# Patient Record
Sex: MALE | Race: WHITE | NOT HISPANIC OR LATINO | Employment: FULL TIME | URBAN - METROPOLITAN AREA
[De-identification: names, ages, dates, MRNs, and addresses within clinical notes are randomized per-mention and may not be internally consistent; named-entity substitution may affect disease eponyms.]

---

## 2017-12-26 ENCOUNTER — HOSPITAL ENCOUNTER (EMERGENCY)
Facility: HOSPITAL | Age: 57
Discharge: HOME | End: 2017-12-26
Attending: EMERGENCY MEDICINE | Admitting: EMERGENCY MEDICINE

## 2017-12-26 ENCOUNTER — APPOINTMENT (OUTPATIENT)
Dept: GENERAL RADIOLOGY | Facility: HOSPITAL | Age: 57
End: 2017-12-26

## 2017-12-26 ENCOUNTER — APPOINTMENT (OUTPATIENT)
Dept: CT IMAGING | Facility: HOSPITAL | Age: 57
End: 2017-12-26

## 2017-12-26 VITALS
RESPIRATION RATE: 18 BRPM | BODY MASS INDEX: 27.77 KG/M2 | HEART RATE: 55 BPM | SYSTOLIC BLOOD PRESSURE: 122 MMHG | WEIGHT: 205 LBS | OXYGEN SATURATION: 96 % | DIASTOLIC BLOOD PRESSURE: 86 MMHG | TEMPERATURE: 97.6 F | HEIGHT: 72 IN

## 2017-12-26 DIAGNOSIS — N20.0 KIDNEY STONE: Primary | ICD-10-CM

## 2017-12-26 LAB
ALBUMIN SERPL-MCNC: 4.8 G/DL (ref 3.2–4.8)
ALBUMIN/GLOB SERPL: 1.8 G/DL (ref 1.5–2.5)
ALP SERPL-CCNC: 77 U/L (ref 25–100)
ALT SERPL W P-5'-P-CCNC: 28 U/L (ref 7–40)
ANION GAP SERPL CALCULATED.3IONS-SCNC: 9 MMOL/L (ref 3–11)
AST SERPL-CCNC: 24 U/L (ref 0–33)
BACTERIA UR QL AUTO: ABNORMAL /HPF
BASOPHILS # BLD AUTO: 0.03 10*3/MM3 (ref 0–0.2)
BASOPHILS NFR BLD AUTO: 0.2 % (ref 0–1)
BILIRUB SERPL-MCNC: 1.8 MG/DL (ref 0.3–1.2)
BILIRUB UR QL STRIP: NEGATIVE
BNP SERPL-MCNC: 15 PG/ML (ref 0–100)
BUN BLD-MCNC: 23 MG/DL (ref 9–23)
BUN/CREAT SERPL: 20.9 (ref 7–25)
CALCIUM SPEC-SCNC: 9.5 MG/DL (ref 8.7–10.4)
CHLORIDE SERPL-SCNC: 106 MMOL/L (ref 99–109)
CLARITY UR: ABNORMAL
CO2 SERPL-SCNC: 28 MMOL/L (ref 20–31)
COLOR UR: YELLOW
CREAT BLD-MCNC: 1.1 MG/DL (ref 0.6–1.3)
DEPRECATED RDW RBC AUTO: 47.3 FL (ref 37–54)
EOSINOPHIL # BLD AUTO: 0.03 10*3/MM3 (ref 0–0.3)
EOSINOPHIL NFR BLD AUTO: 0.2 % (ref 0–3)
ERYTHROCYTE [DISTWIDTH] IN BLOOD BY AUTOMATED COUNT: 14 % (ref 11.3–14.5)
GFR SERPL CREATININE-BSD FRML MDRD: 69 ML/MIN/1.73
GLOBULIN UR ELPH-MCNC: 2.6 GM/DL
GLUCOSE BLD-MCNC: 121 MG/DL (ref 70–100)
GLUCOSE UR STRIP-MCNC: NEGATIVE MG/DL
HCT VFR BLD AUTO: 46 % (ref 38.9–50.9)
HGB BLD-MCNC: 15.7 G/DL (ref 13.1–17.5)
HGB UR QL STRIP.AUTO: ABNORMAL
HOLD SPECIMEN: NORMAL
HOLD SPECIMEN: NORMAL
HYALINE CASTS UR QL AUTO: ABNORMAL /LPF
IMM GRANULOCYTES # BLD: 0.03 10*3/MM3 (ref 0–0.03)
IMM GRANULOCYTES NFR BLD: 0.2 % (ref 0–0.6)
KETONES UR QL STRIP: ABNORMAL
LEUKOCYTE ESTERASE UR QL STRIP.AUTO: NEGATIVE
LIPASE SERPL-CCNC: 30 U/L (ref 6–51)
LYMPHOCYTES # BLD AUTO: 0.6 10*3/MM3 (ref 0.6–4.8)
LYMPHOCYTES NFR BLD AUTO: 4.3 % (ref 24–44)
MCH RBC QN AUTO: 31.7 PG (ref 27–31)
MCHC RBC AUTO-ENTMCNC: 34.1 G/DL (ref 32–36)
MCV RBC AUTO: 92.9 FL (ref 80–99)
MONOCYTES # BLD AUTO: 0.71 10*3/MM3 (ref 0–1)
MONOCYTES NFR BLD AUTO: 5 % (ref 0–12)
NEUTROPHILS # BLD AUTO: 12.66 10*3/MM3 (ref 1.5–8.3)
NEUTROPHILS NFR BLD AUTO: 90.1 % (ref 41–71)
NITRITE UR QL STRIP: NEGATIVE
PH UR STRIP.AUTO: <=5 [PH] (ref 5–8)
PLATELET # BLD AUTO: 280 10*3/MM3 (ref 150–450)
PMV BLD AUTO: 10.1 FL (ref 6–12)
POTASSIUM BLD-SCNC: 4.1 MMOL/L (ref 3.5–5.5)
PROT SERPL-MCNC: 7.4 G/DL (ref 5.7–8.2)
PROT UR QL STRIP: NEGATIVE
RBC # BLD AUTO: 4.95 10*6/MM3 (ref 4.2–5.76)
RBC # UR: ABNORMAL /HPF
REF LAB TEST METHOD: ABNORMAL
SODIUM BLD-SCNC: 143 MMOL/L (ref 132–146)
SP GR UR STRIP: 1.02 (ref 1–1.03)
SQUAMOUS #/AREA URNS HPF: ABNORMAL /HPF
TROPONIN I SERPL-MCNC: 0 NG/ML (ref 0–0.07)
UROBILINOGEN UR QL STRIP: ABNORMAL
WBC NRBC COR # BLD: 14.06 10*3/MM3 (ref 3.5–10.8)
WBC UR QL AUTO: ABNORMAL /HPF
WHOLE BLOOD HOLD SPECIMEN: NORMAL
WHOLE BLOOD HOLD SPECIMEN: NORMAL

## 2017-12-26 PROCEDURE — 85025 COMPLETE CBC W/AUTO DIFF WBC: CPT | Performed by: EMERGENCY MEDICINE

## 2017-12-26 PROCEDURE — 71010 HC CHEST PA OR AP: CPT

## 2017-12-26 PROCEDURE — 84484 ASSAY OF TROPONIN QUANT: CPT

## 2017-12-26 PROCEDURE — 83880 ASSAY OF NATRIURETIC PEPTIDE: CPT | Performed by: EMERGENCY MEDICINE

## 2017-12-26 PROCEDURE — 96375 TX/PRO/DX INJ NEW DRUG ADDON: CPT

## 2017-12-26 PROCEDURE — 93005 ELECTROCARDIOGRAM TRACING: CPT

## 2017-12-26 PROCEDURE — 81001 URINALYSIS AUTO W/SCOPE: CPT | Performed by: EMERGENCY MEDICINE

## 2017-12-26 PROCEDURE — 96361 HYDRATE IV INFUSION ADD-ON: CPT

## 2017-12-26 PROCEDURE — 99283 EMERGENCY DEPT VISIT LOW MDM: CPT

## 2017-12-26 PROCEDURE — 96374 THER/PROPH/DIAG INJ IV PUSH: CPT

## 2017-12-26 PROCEDURE — 83690 ASSAY OF LIPASE: CPT | Performed by: EMERGENCY MEDICINE

## 2017-12-26 PROCEDURE — 80053 COMPREHEN METABOLIC PANEL: CPT | Performed by: EMERGENCY MEDICINE

## 2017-12-26 PROCEDURE — 25010000002 KETOROLAC TROMETHAMINE PER 15 MG: Performed by: EMERGENCY MEDICINE

## 2017-12-26 PROCEDURE — 74176 CT ABD & PELVIS W/O CONTRAST: CPT

## 2017-12-26 PROCEDURE — 25010000002 ONDANSETRON PER 1 MG: Performed by: EMERGENCY MEDICINE

## 2017-12-26 RX ORDER — FOLIC ACID 1 MG/1
2 TABLET ORAL DAILY
COMMUNITY

## 2017-12-26 RX ORDER — KETOROLAC TROMETHAMINE 10 MG/1
10 TABLET, FILM COATED ORAL EVERY 6 HOURS PRN
Qty: 12 TABLET | Refills: 0 | Status: SHIPPED | OUTPATIENT
Start: 2017-12-26 | End: 2018-01-31

## 2017-12-26 RX ORDER — LEVOTHYROXINE SODIUM 175 UG/1
175 TABLET ORAL DAILY
COMMUNITY
End: 2018-08-09

## 2017-12-26 RX ORDER — KETOROLAC TROMETHAMINE 15 MG/ML
15 INJECTION, SOLUTION INTRAMUSCULAR; INTRAVENOUS ONCE
Status: COMPLETED | OUTPATIENT
Start: 2017-12-26 | End: 2017-12-26

## 2017-12-26 RX ORDER — OXYCODONE AND ACETAMINOPHEN 7.5; 325 MG/1; MG/1
1 TABLET ORAL EVERY 6 HOURS PRN
Qty: 12 TABLET | Refills: 0 | Status: SHIPPED | OUTPATIENT
Start: 2017-12-26 | End: 2018-01-31

## 2017-12-26 RX ORDER — ONDANSETRON 2 MG/ML
4 INJECTION INTRAMUSCULAR; INTRAVENOUS ONCE
Status: COMPLETED | OUTPATIENT
Start: 2017-12-26 | End: 2017-12-26

## 2017-12-26 RX ORDER — ASPIRIN 81 MG/1
324 TABLET, CHEWABLE ORAL ONCE
Status: DISCONTINUED | OUTPATIENT
Start: 2017-12-26 | End: 2017-12-26

## 2017-12-26 RX ORDER — SODIUM CHLORIDE 0.9 % (FLUSH) 0.9 %
10 SYRINGE (ML) INJECTION AS NEEDED
Status: DISCONTINUED | OUTPATIENT
Start: 2017-12-26 | End: 2017-12-27 | Stop reason: HOSPADM

## 2017-12-26 RX ORDER — TAMSULOSIN HYDROCHLORIDE 0.4 MG/1
1 CAPSULE ORAL NIGHTLY
Qty: 10 CAPSULE | Refills: 0 | Status: SHIPPED | OUTPATIENT
Start: 2017-12-26 | End: 2018-01-31

## 2017-12-26 RX ORDER — ONDANSETRON 4 MG/1
4 TABLET, FILM COATED ORAL EVERY 6 HOURS PRN
Qty: 10 TABLET | Refills: 0 | Status: SHIPPED | OUTPATIENT
Start: 2017-12-26 | End: 2018-01-31

## 2017-12-26 RX ORDER — SODIUM CHLORIDE 9 MG/ML
125 INJECTION, SOLUTION INTRAVENOUS CONTINUOUS
Status: DISCONTINUED | OUTPATIENT
Start: 2017-12-26 | End: 2017-12-27 | Stop reason: HOSPADM

## 2017-12-26 RX ADMIN — ONDANSETRON 4 MG: 2 INJECTION INTRAMUSCULAR; INTRAVENOUS at 21:47

## 2017-12-26 RX ADMIN — SODIUM CHLORIDE 1000 ML: 9 INJECTION, SOLUTION INTRAVENOUS at 21:47

## 2017-12-26 RX ADMIN — KETOROLAC TROMETHAMINE 15 MG: 15 INJECTION, SOLUTION INTRAMUSCULAR; INTRAVENOUS at 21:47

## 2018-01-31 ENCOUNTER — OFFICE VISIT (OUTPATIENT)
Dept: INTERNAL MEDICINE | Facility: CLINIC | Age: 58
End: 2018-01-31

## 2018-01-31 VITALS
WEIGHT: 213 LBS | TEMPERATURE: 98.2 F | BODY MASS INDEX: 28.85 KG/M2 | DIASTOLIC BLOOD PRESSURE: 70 MMHG | SYSTOLIC BLOOD PRESSURE: 124 MMHG | HEIGHT: 72 IN

## 2018-01-31 DIAGNOSIS — Z12.11 SCREENING FOR COLON CANCER: ICD-10-CM

## 2018-01-31 DIAGNOSIS — G47.33 OSA (OBSTRUCTIVE SLEEP APNEA): ICD-10-CM

## 2018-01-31 DIAGNOSIS — Z00.00 HEALTH CARE MAINTENANCE: ICD-10-CM

## 2018-01-31 DIAGNOSIS — E03.9 ACQUIRED HYPOTHYROIDISM: Primary | ICD-10-CM

## 2018-01-31 DIAGNOSIS — Z11.59 ENCOUNTER FOR HEPATITIS C SCREENING TEST FOR LOW RISK PATIENT: ICD-10-CM

## 2018-01-31 PROBLEM — Z87.442 HISTORY OF KIDNEY STONES: Status: ACTIVE | Noted: 2018-01-31

## 2018-01-31 PROBLEM — M05.79 RHEUMATOID ARTHRITIS INVOLVING MULTIPLE SITES WITH POSITIVE RHEUMATOID FACTOR (HCC): Status: ACTIVE | Noted: 2018-01-31

## 2018-01-31 LAB
ALBUMIN SERPL-MCNC: 4.4 G/DL (ref 3.2–4.8)
ALBUMIN/GLOB SERPL: 1.8 G/DL (ref 1.5–2.5)
ALP SERPL-CCNC: 68 U/L (ref 25–100)
ALT SERPL W P-5'-P-CCNC: 22 U/L (ref 7–40)
ANION GAP SERPL CALCULATED.3IONS-SCNC: 6 MMOL/L (ref 3–11)
ARTICHOKE IGE QN: 65 MG/DL (ref 0–130)
AST SERPL-CCNC: 20 U/L (ref 0–33)
BILIRUB SERPL-MCNC: 1.3 MG/DL (ref 0.3–1.2)
BUN BLD-MCNC: 21 MG/DL (ref 9–23)
BUN/CREAT SERPL: 23.3 (ref 7–25)
CALCIUM SPEC-SCNC: 9.1 MG/DL (ref 8.7–10.4)
CHLORIDE SERPL-SCNC: 103 MMOL/L (ref 99–109)
CHOLEST SERPL-MCNC: 133 MG/DL (ref 0–200)
CO2 SERPL-SCNC: 30 MMOL/L (ref 20–31)
CREAT BLD-MCNC: 0.9 MG/DL (ref 0.6–1.3)
GFR SERPL CREATININE-BSD FRML MDRD: 86 ML/MIN/1.73
GLOBULIN UR ELPH-MCNC: 2.4 GM/DL
GLUCOSE BLD-MCNC: 92 MG/DL (ref 70–100)
HCV AB SER DONR QL: NORMAL
HDLC SERPL-MCNC: 50 MG/DL (ref 40–60)
POTASSIUM BLD-SCNC: 4.6 MMOL/L (ref 3.5–5.5)
PROT SERPL-MCNC: 6.8 G/DL (ref 5.7–8.2)
SODIUM BLD-SCNC: 139 MMOL/L (ref 132–146)
T4 FREE SERPL-MCNC: 1.47 NG/DL (ref 0.89–1.76)
TRIGL SERPL-MCNC: 112 MG/DL (ref 0–150)
TSH SERPL DL<=0.05 MIU/L-ACNC: 1.79 MIU/ML (ref 0.35–5.35)

## 2018-01-31 PROCEDURE — 80061 LIPID PANEL: CPT | Performed by: INTERNAL MEDICINE

## 2018-01-31 PROCEDURE — 80053 COMPREHEN METABOLIC PANEL: CPT | Performed by: INTERNAL MEDICINE

## 2018-01-31 PROCEDURE — 86803 HEPATITIS C AB TEST: CPT | Performed by: INTERNAL MEDICINE

## 2018-01-31 PROCEDURE — 84439 ASSAY OF FREE THYROXINE: CPT | Performed by: INTERNAL MEDICINE

## 2018-01-31 PROCEDURE — 99204 OFFICE O/P NEW MOD 45 MIN: CPT | Performed by: INTERNAL MEDICINE

## 2018-01-31 PROCEDURE — 84443 ASSAY THYROID STIM HORMONE: CPT | Performed by: INTERNAL MEDICINE

## 2018-01-31 NOTE — PATIENT INSTRUCTIONS
Hypothyroidism  Hypothyroidism is a disorder of the thyroid. The thyroid is a large gland that is located in the lower front of the neck. The thyroid releases hormones that control how the body works. With hypothyroidism, the thyroid does not make enough of these hormones.  What are the causes?  Causes of hypothyroidism may include:  · Viral infections.  · Pregnancy.  · Your own defense system (immune system) attacking your thyroid.  · Certain medicines.  · Birth defects.  · Past radiation treatments to your head or neck.  · Past treatment with radioactive iodine.  · Past surgical removal of part or all of your thyroid.  · Problems with the gland that is located in the center of your brain (pituitary).  What are the signs or symptoms?  Signs and symptoms of hypothyroidism may include:  · Feeling as though you have no energy (lethargy).  · Inability to tolerate cold.  · Weight gain that is not explained by a change in diet or exercise habits.  · Dry skin.  · Coarse hair.  · Menstrual irregularity.  · Slowing of thought processes.  · Constipation.  · Sadness or depression.  How is this diagnosed?  Your health care provider may diagnose hypothyroidism with blood tests and ultrasound tests.  How is this treated?  Hypothyroidism is treated with medicine that replaces the hormones that your body does not make. After you begin treatment, it may take several weeks for symptoms to go away.  Follow these instructions at home:  · Take medicines only as directed by your health care provider.  · If you start taking any new medicines, tell your health care provider.  · Keep all follow-up visits as directed by your health care provider. This is important. As your condition improves, your dosage needs may change. You will need to have blood tests regularly so that your health care provider can watch your condition.  Contact a health care provider if:  · Your symptoms do not get better with treatment.  · You are taking thyroid  replacement medicine and:  ¨ You sweat excessively.  ¨ You have tremors.  ¨ You feel anxious.  ¨ You lose weight rapidly.  ¨ You cannot tolerate heat.  ¨ You have emotional swings.  ¨ You have diarrhea.  ¨ You feel weak.  Get help right away if:  · You develop chest pain.  · You develop an irregular heartbeat.  · You develop a rapid heartbeat.  This information is not intended to replace advice given to you by your health care provider. Make sure you discuss any questions you have with your health care provider.  Document Released: 12/18/2006 Document Revised: 05/25/2017 Document Reviewed: 05/05/2015  [employer] Interactive Patient Education © 2017 [employer]

## 2018-01-31 NOTE — PROGRESS NOTES
Subjective   Spenser Pascal is a 57 y.o. male here to establish care for kidney stone, RA, JAZMIN, hypothyroidism.  Kidney stone: went to ED recently for right flank pain. 4mm stone, has uro f/u today. He has not passed any stones or had any hematuria. He continues to have dull nagging right flank pain but nothing severe. RA: seeing rheum, had MTX dose increased recently. Also on folic acid. Not having any joint pain. JAZMIN: has a cpap and needs a new sleep med doctor as he has just moved here and doesn't have a local doc. He sleeps only 4h a night then wakes, awake for awhile, then sleeps for another 2h then gets up for the day. He has tried melatonin which doesn't help. Hypothyroidism: discovered on routine screening blood work, dose hasn't been changed for 1.5y but he has not had blood work since then either. Has hx of diverticulosis and polyps and requires screening cscope q5y, needs referral for that as well. No bowel sx.     Review of Systems   Constitutional: Negative.    HENT: Negative.    Eyes: Negative.    Respiratory: Negative.  Negative for shortness of breath.    Cardiovascular: Negative.  Negative for chest pain and palpitations.   Gastrointestinal: Negative.    Endocrine: Negative.    Genitourinary: Negative for decreased urine volume and hematuria.        Flank pain   Musculoskeletal: Negative.  Negative for neck pain.   Skin: Negative.    Allergic/Immunologic: Negative.    Neurological: Negative.  Negative for headaches.   Hematological: Negative.    Psychiatric/Behavioral: Negative.        Past Medical History:   Diagnosis Date   • Otosclerosis    • Rheumatoid arthritis      History reviewed. No pertinent family history.  Past Surgical History:   Procedure Laterality Date   • LAMINECTOMY     • LIPOMA EXCISION     • SALIVARY GLAND SURGERY       Social History     Social History   • Marital status:      Spouse name: N/A   • Number of children: N/A   • Years of education: N/A     Occupational History  "  • Not on file.     Social History Main Topics   • Smoking status: Never Smoker   • Smokeless tobacco: Not on file   • Alcohol use 3.0 oz/week     5 Glasses of wine per week   • Drug use: No   • Sexual activity: Not on file     Other Topics Concern   • Not on file     Social History Narrative         Current Outpatient Prescriptions:   •  folic acid (FOLVITE) 1 MG tablet, Take 2 mg by mouth Daily., Disp: , Rfl:   •  levothyroxine (SYNTHROID, LEVOTHROID) 175 MCG tablet, Take 175 mcg by mouth Daily., Disp: , Rfl:   •  METHOTREXATE, ANTI-RHEUMATIC, PO, Take  by mouth 1 (One) Time Per Week., Disp: , Rfl:   •  VITAMIN D, CHOLECALCIFEROL, PO, Take  by mouth 1 (One) Time Per Week., Disp: , Rfl:     Objective   /70 (BP Location: Right arm, Patient Position: Sitting, Cuff Size: Adult)  Temp 98.2 °F (36.8 °C) (Temporal Artery )   Ht 182.9 cm (72\")  Wt 96.6 kg (213 lb)  BMI 28.89 kg/m2  Physical Exam   Constitutional: He is oriented to person, place, and time. He appears well-developed and well-nourished.   HENT:   Head: Normocephalic and atraumatic.   Nose: Nose normal.   Eyes: Conjunctivae are normal.   Cardiovascular: Normal rate, regular rhythm and normal heart sounds.  Exam reveals no gallop and no friction rub.    No murmur heard.  Pulmonary/Chest: Effort normal and breath sounds normal. He has no wheezes.   Musculoskeletal:   Normal gait and station   Neurological: He is alert and oriented to person, place, and time.   Skin: Skin is warm and dry.   Psychiatric: He has a normal mood and affect. His behavior is normal. Judgment and thought content normal.   Vitals reviewed.      Assessment/Plan   Spenser was seen today for establish care.    Diagnoses and all orders for this visit:    Acquired hypothyroidism  -     TSH  -     T4, Free    Health care maintenance  -     Comprehensive Metabolic Panel  -     Lipid Panel    Encounter for hepatitis C screening test for low risk patient  -     Hepatitis C " Antibody    Screening for colon cancer  -     Ambulatory Referral For Screening Colonoscopy    JAZMIN (obstructive sleep apnea)  -     Ambulatory Referral to Sleep Medicine           Answers for HPI/ROS submitted by the patient on 1/31/2018   Hypertension  Chronicity: new  Onset: 1 to 4 weeks ago  Progression since onset: unchanged  Condition status: resistant  anxiety: Yes  blurred vision: No  malaise/fatigue: No  orthopnea: No  peripheral edema: No  PND: No  sweats: No  Agents associated with hypertension: thyroid hormones  CAD risks: family history, stress  Compliance problems: no compliance problems

## 2018-03-22 ENCOUNTER — LAB REQUISITION (OUTPATIENT)
Dept: LAB | Facility: HOSPITAL | Age: 58
End: 2018-03-22

## 2018-03-22 ENCOUNTER — OUTSIDE FACILITY SERVICE (OUTPATIENT)
Dept: GASTROENTEROLOGY | Facility: CLINIC | Age: 58
End: 2018-03-22

## 2018-03-22 DIAGNOSIS — D12.2 BENIGN NEOPLASM OF ASCENDING COLON: ICD-10-CM

## 2018-03-22 PROCEDURE — 88305 TISSUE EXAM BY PATHOLOGIST: CPT | Performed by: INTERNAL MEDICINE

## 2018-03-22 PROCEDURE — 45385 COLONOSCOPY W/LESION REMOVAL: CPT | Performed by: INTERNAL MEDICINE

## 2018-03-23 ENCOUNTER — TELEPHONE (OUTPATIENT)
Dept: GASTROENTEROLOGY | Facility: CLINIC | Age: 58
End: 2018-03-23

## 2018-03-23 LAB
CYTO UR: NORMAL
LAB AP CASE REPORT: NORMAL
LAB AP CLINICAL INFORMATION: NORMAL
Lab: NORMAL
PATH REPORT.FINAL DX SPEC: NORMAL
PATH REPORT.GROSS SPEC: NORMAL

## 2018-03-23 NOTE — TELEPHONE ENCOUNTER
----- Message from Nii Mills MD sent at 3/23/2018  2:23 PM EDT -----  Please call Spenser and inform him he had an adenoma. Follow up in 5 years is indicated. Dr. Mills

## 2018-07-30 ENCOUNTER — OFFICE VISIT (OUTPATIENT)
Dept: INTERNAL MEDICINE | Facility: CLINIC | Age: 58
End: 2018-07-30

## 2018-07-30 VITALS
HEIGHT: 72 IN | SYSTOLIC BLOOD PRESSURE: 118 MMHG | DIASTOLIC BLOOD PRESSURE: 74 MMHG | TEMPERATURE: 97.5 F | WEIGHT: 212 LBS | BODY MASS INDEX: 28.71 KG/M2

## 2018-07-30 DIAGNOSIS — E55.9 VITAMIN D DEFICIENCY: ICD-10-CM

## 2018-07-30 DIAGNOSIS — E03.9 ACQUIRED HYPOTHYROIDISM: Primary | ICD-10-CM

## 2018-07-30 PROCEDURE — 99213 OFFICE O/P EST LOW 20 MIN: CPT | Performed by: INTERNAL MEDICINE

## 2018-07-30 RX ORDER — ADALIMUMAB 40MG/0.8ML
KIT SUBCUTANEOUS
COMMUNITY
Start: 2018-07-18 | End: 2019-01-25

## 2018-08-09 LAB
25(OH)D3 SERPL-MCNC: 23.8 NG/ML
T4 FREE SERPL-MCNC: 1.27 NG/DL (ref 0.89–1.76)
TSH SERPL DL<=0.05 MIU/L-ACNC: 5.16 MIU/ML (ref 0.35–5.35)

## 2018-08-09 PROCEDURE — 84443 ASSAY THYROID STIM HORMONE: CPT | Performed by: INTERNAL MEDICINE

## 2018-08-09 PROCEDURE — 84439 ASSAY OF FREE THYROXINE: CPT | Performed by: INTERNAL MEDICINE

## 2018-08-09 PROCEDURE — 82306 VITAMIN D 25 HYDROXY: CPT | Performed by: INTERNAL MEDICINE

## 2018-08-09 RX ORDER — LEVOTHYROXINE SODIUM 0.2 MG/1
200 TABLET ORAL DAILY
Qty: 30 TABLET | Refills: 1 | Status: SHIPPED | OUTPATIENT
Start: 2018-08-09 | End: 2018-10-04 | Stop reason: SDUPTHER

## 2018-08-10 ENCOUNTER — TELEPHONE (OUTPATIENT)
Dept: INTERNAL MEDICINE | Facility: CLINIC | Age: 58
End: 2018-08-10

## 2018-08-10 NOTE — TELEPHONE ENCOUNTER
----- Message from Mariposa Palm MD sent at 8/9/2018 12:31 PM EDT -----  Please call the patient regarding his abnormal result. Tell him thyroid dose is too low, increasing to 200mcg daily and I sent that in. Recheck 6 weeks on new dose. Also still low on vitamin D, does he take a supplement? Rec 4,000 units daily.

## 2018-09-22 ENCOUNTER — OFFICE VISIT (OUTPATIENT)
Dept: RETAIL CLINIC | Facility: CLINIC | Age: 58
End: 2018-09-22

## 2018-09-22 VITALS
HEIGHT: 72 IN | WEIGHT: 208 LBS | BODY MASS INDEX: 28.17 KG/M2 | RESPIRATION RATE: 16 BRPM | SYSTOLIC BLOOD PRESSURE: 150 MMHG | OXYGEN SATURATION: 98 % | TEMPERATURE: 98.6 F | DIASTOLIC BLOOD PRESSURE: 80 MMHG | HEART RATE: 71 BPM

## 2018-09-22 DIAGNOSIS — T16.2XXA ACUTE FOREIGN BODY OF LEFT EAR, INITIAL ENCOUNTER: Primary | ICD-10-CM

## 2018-09-22 PROCEDURE — 99213 OFFICE O/P EST LOW 20 MIN: CPT | Performed by: NURSE PRACTITIONER

## 2018-09-22 PROCEDURE — 69200 CLEAR OUTER EAR CANAL: CPT | Performed by: NURSE PRACTITIONER

## 2018-09-22 NOTE — PATIENT INSTRUCTIONS
Ear Foreign Body  An ear foreign body is an object that is stuck in your ear. The object is usually stuck in the ear canal.  What are the causes?  In all ages of people, the most common foreign bodies are insects that enter the ear canal. It is common for young children to put objects into the ear canal. These may include marvin, beads, parts of toys, and any other small objects that fit into the ear. In adults, objects such as cotton swabs may become lodged in the ear canal.  What are the signs or symptoms?  A foreign body in the ear may cause:  · Pain.  · Buzzing or roaring sounds.  · Hearing loss.  · Ear drainage or bleeding.  · Nausea and vomiting.  · A feeling that your ear is full.    How is this diagnosed?  Your health care provider may be able to diagnose an ear foreign body based on the information that you provide, your symptoms, and a physical exam. Your health care provider may also perform tests, such as testing your hearing and your ear pressure, to check for infection or other problems that are caused by the foreign body in your ear.  How is this treated?  Treatment depends on what the foreign body is, the location of the foreign body in your ear, and whether or not the foreign body has injured any part of your inner ear. If the foreign body is visible to your health care provider, it may be possible to remove the foreign body using:  · A tool, such as medical tweezers (forceps) or a suction tube (catheter).  · Irrigation. This uses water to flush the foreign body out of your ear. This is used only if the foreign body is not likely to swell or enlarge when it is put in water.    If the foreign body is not visible or your health care provider was not able to remove the foreign body, you may be referred to a specialist for removal. You may also be prescribed antibiotic medicine or ear drops to prevent infection. If the foreign body has caused injury to other parts of your ear, you may need additional  treatment.  Follow these instructions at home:  · Keep all follow-up visits as directed by your health care provider. This is important.  · Take medicines only as directed by your health care provider.  · If you were prescribed an antibiotic medicine, finish it all even if you start to feel better.  How is this prevented?  · Keep small objects out of reach of young children. Tell children not to put anything in their ears.  · Do not put anything in your ear, including cotton swabs, to clean your ears. Talk to your health care provider about how to clean your ears safely.  Contact a health care provider if:  · You have a headache.  · Your have blood coming from your ear.  · You have a fever.  · You have increased pain or swelling of your ear.  · Your hearing is reduced.  · You have discharge coming from your ear.  This information is not intended to replace advice given to you by your health care provider. Make sure you discuss any questions you have with your health care provider.  Document Released: 12/15/2001 Document Revised: 04/28/2017 Document Reviewed: 08/03/2015  [employer] Interactive Patient Education © 2018 [employer]    Call clinic for questions/problems  Keep scheduled appt with primary care providers and other specialists

## 2018-09-22 NOTE — PROGRESS NOTES
Subjective   Foreign Body in Ear    Spenser Pascal is a 58 y.o. male who presents with foreign body in left ear canal. States noted tip of hearing aid stuck in ear x 3 days. Has attempted to remove with tweezers without success.  He has no additional symptoms or complaints.     Foreign Body in Ear   The incident occurred 3 to 5 days ago. Suspected object: platic tip of hearing aide. The foreign body is suspected to be in the left ear. The incident was reported. Associated symptoms include hearing loss (chonic with use of bilat hearing aids). Pertinent negatives include no chest pain, cough, trouble swallowing or wheezing. Associated symptoms comments: Pressure to left ear canal  . His past medical history is significant for a prior foreign body removal (left ear canal).        History Obtained from: Patient    Past Medical History:   Diagnosis Date   • Otosclerosis    • Rheumatoid arthritis (CMS/HCC)      Past Surgical History:   Procedure Laterality Date   • LAMINECTOMY     • LIPOMA EXCISION     • SALIVARY GLAND SURGERY       Social History     Social History   • Marital status:      Spouse name: N/A   • Number of children: N/A   • Years of education: N/A     Occupational History   • Not on file.     Social History Main Topics   • Smoking status: Never Smoker   • Smokeless tobacco: Never Used   • Alcohol use 3.0 oz/week     5 Glasses of wine per week   • Drug use: No   • Sexual activity: Not on file     Other Topics Concern   • Not on file     Social History Narrative   • No narrative on file     No family history on file.  No Known Allergies  Current Outpatient Prescriptions   Medication Sig Dispense Refill   • folic acid (FOLVITE) 1 MG tablet Take 2 mg by mouth Daily.     • HUMIRA PEN 40 MG/0.8ML Pen-injector Kit      • levothyroxine (SYNTHROID) 200 MCG tablet Take 1 tablet by mouth Daily. 30 tablet 1   • METHOTREXATE, ANTI-RHEUMATIC, PO Take  by mouth 1 (One) Time Per Week.       No current  "facility-administered medications for this visit.         The following portions of the patient's history were reviewed and updated as appropriate: allergies, current medications, past family history, past medical history, past social history and past surgical history.    Review of Systems   Constitutional: Negative.    HENT: Positive for ear pain (pressure left ear canal) and hearing loss (chonic with use of bilat hearing aids). Negative for ear discharge, postnasal drip, rhinorrhea, sinus pressure and trouble swallowing.    Eyes: Negative.    Respiratory: Negative for cough, shortness of breath and wheezing.    Cardiovascular: Negative for chest pain and palpitations.   Gastrointestinal: Negative.    Musculoskeletal: Positive for arthralgias (chronic, with h/o RA). Negative for neck pain and neck stiffness.   Skin: Negative for rash and wound.   Allergic/Immunologic: Positive for environmental allergies and immunocompromised state (due to use of immunosupressive medications).   Neurological: Negative for dizziness, light-headedness and headaches.   Hematological: Negative for adenopathy. Does not bruise/bleed easily.   Psychiatric/Behavioral: Negative.        Objective     VITAL SIGNS:   Vitals:    09/22/18 0955   BP: 150/80   Pulse: 71   Resp: 16   Temp: 98.6 °F (37 °C)   SpO2: 98%   Weight: 94.3 kg (208 lb)   Height: 182.9 cm (72\")   Body mass index is 28.21 kg/m².    Physical Exam   Constitutional: He appears well-nourished. He is cooperative.  Non-toxic appearance. He does not appear ill. No distress.   HENT:   Head: Normocephalic and atraumatic.   Right Ear: External ear and ear canal normal. No swelling. Tympanic membrane is scarred. Tympanic membrane is not perforated.   Left Ear: External ear normal.   Nose: Nose normal. No mucosal edema.   Mouth/Throat: Oropharynx is clear and moist and mucous membranes are normal.   Foreign body visualized left ear canal.    Cardiovascular: Normal rate.  "   Pulmonary/Chest: Effort normal and breath sounds normal.   Lymphadenopathy:        Head (left side): Posterior auricular (subcentimeter node palpated) adenopathy present.     He has no cervical adenopathy.        Right: No supraclavicular adenopathy present.        Left: No supraclavicular adenopathy present.   Neurological: He is alert. Coordination and gait normal.   Skin: Skin is warm and dry.   Psychiatric: His behavior is normal. He is attentive.     Procedure Note:  Under direct visualization, foreign body left ear canal removed successfully with straight forceps. Patient tolerated well. Left ear canal and TM with mild erythema. No TM perforation observed. Patient reports canal pressure relieved.        CLINICAL QUALITY MEASURES:  Tobacco Screening & Intervention Screened & identified as tobacco non-user. Never smoker   WEIGHT SCREENING/BMI  Not eligible, overweight & managed by other physician     Assessment/Plan     Spenser was seen today for foreign body in ear.    Diagnoses and all orders for this visit:    Acute foreign body of left ear, initial encounter      PLAN:  Patient should follow up with primary care provider as scheduled. Observe for ear pain or drainage, promptly report abnormal findings. The patient voiced understanding and agreement to the patient treatment plan and instructions     MOON Sparks

## 2018-10-04 RX ORDER — LEVOTHYROXINE SODIUM 0.2 MG/1
TABLET ORAL
Qty: 30 TABLET | Refills: 1 | Status: SHIPPED | OUTPATIENT
Start: 2018-10-04 | End: 2018-12-14 | Stop reason: SDUPTHER

## 2018-10-16 ENCOUNTER — OFFICE VISIT (OUTPATIENT)
Dept: INTERNAL MEDICINE | Facility: CLINIC | Age: 58
End: 2018-10-16

## 2018-10-16 VITALS
TEMPERATURE: 97.8 F | BODY MASS INDEX: 28.33 KG/M2 | HEIGHT: 72 IN | SYSTOLIC BLOOD PRESSURE: 148 MMHG | WEIGHT: 209.2 LBS | DIASTOLIC BLOOD PRESSURE: 88 MMHG

## 2018-10-16 DIAGNOSIS — R03.0 ELEVATED BLOOD PRESSURE READING: Primary | ICD-10-CM

## 2018-10-16 PROCEDURE — 99213 OFFICE O/P EST LOW 20 MIN: CPT | Performed by: INTERNAL MEDICINE

## 2018-10-16 NOTE — PROGRESS NOTES
"Subjective   Spenser Pascal is a 58 y.o. male here for follow-up high BP readings at rheumatologist with manual cuff, generally 130-140 systolic. Has also had some chest pressure when he had high BP yesterday. Has never had a dx of HTN, was always well-controlled. He has started running again recently, trying to lose weight. Doesn't desire to be on another med. Has hx of RA and is being treated currently by rheum. Doesn't check BP at home.      The following portions of the patient's history were reviewed and updated as appropriate: allergies, current medications, past medical history, past social history and problem list.    Review of Systems:  General: negative  CV: chest pressure  Respiratory: negative  Neuro: some HA    Objective   /88 (BP Location: Left arm, Patient Position: Sitting, Cuff Size: Adult)   Temp 97.8 °F (36.6 °C) (Temporal Artery )   Ht 182.9 cm (72\")   Wt 94.9 kg (209 lb 3.2 oz)   BMI 28.37 kg/m²     Physical Exam   Constitutional: He is oriented to person, place, and time. He appears well-developed and well-nourished.   Pulmonary/Chest: Effort normal.   Neurological: He is alert and oriented to person, place, and time.   Skin: Skin is warm and dry.   Psychiatric: He has a normal mood and affect. His behavior is normal. Judgment and thought content normal.   Vitals reviewed.      Assessment/Plan   Spenser was seen today for follow-up.    Diagnoses and all orders for this visit:    Elevated blood pressure reading  -BP was normal up until 1 month ago as all other measurements have been normal  -get home BP cuff and take BP once daily and send those to me via [employer]. If high values will have pt come back in to discuss a med and compare his cuff with ours         "

## 2018-12-06 RX ORDER — LISINOPRIL 20 MG/1
20 TABLET ORAL DAILY
Qty: 30 TABLET | Refills: 2 | Status: SHIPPED | OUTPATIENT
Start: 2018-12-06 | End: 2019-01-02 | Stop reason: SDUPTHER

## 2018-12-14 RX ORDER — LEVOTHYROXINE SODIUM 0.2 MG/1
TABLET ORAL
Qty: 30 TABLET | Refills: 1 | Status: SHIPPED | OUTPATIENT
Start: 2018-12-14 | End: 2019-02-18 | Stop reason: SDUPTHER

## 2018-12-18 ENCOUNTER — OFFICE VISIT (OUTPATIENT)
Dept: RETAIL CLINIC | Facility: CLINIC | Age: 58
End: 2018-12-18

## 2018-12-18 DIAGNOSIS — Z23 FLU VACCINE NEED: Primary | ICD-10-CM

## 2018-12-18 PROCEDURE — 90471 IMMUNIZATION ADMIN: CPT | Performed by: NURSE PRACTITIONER

## 2018-12-18 PROCEDURE — 90686 IIV4 VACC NO PRSV 0.5 ML IM: CPT | Performed by: NURSE PRACTITIONER

## 2018-12-18 NOTE — PROGRESS NOTES
Patient presents to clinic for seasonal influenza vaccination. There are no verbalized contraindications including allergy to eggs, latex, mercury or other flu vaccine components. Denies previous vaccine reaction, current illness or fever.      Consent discussed and signed. VIS given. Vaccination administered. Patient tolerated well. See scanned documents.     Spenser was seen today for flu vaccine.    Diagnoses and all orders for this visit:    Flu vaccine need    Other orders  -     Fluarix/Fluzone/Afluria/FluLaval ([id number])      Lizzeth Burns, MOON

## 2019-01-03 RX ORDER — LISINOPRIL 20 MG/1
TABLET ORAL
Qty: 30 TABLET | Refills: 2 | Status: SHIPPED | OUTPATIENT
Start: 2019-01-03 | End: 2019-07-01

## 2019-01-25 ENCOUNTER — OFFICE VISIT (OUTPATIENT)
Dept: INTERNAL MEDICINE | Facility: CLINIC | Age: 59
End: 2019-01-25

## 2019-01-25 VITALS
HEART RATE: 55 BPM | SYSTOLIC BLOOD PRESSURE: 138 MMHG | OXYGEN SATURATION: 95 % | WEIGHT: 214.8 LBS | TEMPERATURE: 97.6 F | RESPIRATION RATE: 16 BRPM | BODY MASS INDEX: 29.09 KG/M2 | HEIGHT: 72 IN | DIASTOLIC BLOOD PRESSURE: 92 MMHG

## 2019-01-25 DIAGNOSIS — Z12.5 PROSTATE CANCER SCREENING: ICD-10-CM

## 2019-01-25 DIAGNOSIS — Z23 NEED FOR TDAP VACCINATION: ICD-10-CM

## 2019-01-25 DIAGNOSIS — Z00.00 HEALTH CARE MAINTENANCE: Primary | ICD-10-CM

## 2019-01-25 PROBLEM — I10 BENIGN ESSENTIAL HTN: Status: ACTIVE | Noted: 2019-01-25

## 2019-01-25 LAB
ALBUMIN SERPL-MCNC: 4.66 G/DL (ref 3.2–4.8)
ALBUMIN/GLOB SERPL: 2.5 G/DL (ref 1.5–2.5)
ALP SERPL-CCNC: 62 U/L (ref 25–100)
ALT SERPL W P-5'-P-CCNC: 46 U/L (ref 7–40)
ANION GAP SERPL CALCULATED.3IONS-SCNC: 8 MMOL/L (ref 3–11)
ARTICHOKE IGE QN: 86 MG/DL (ref 0–130)
AST SERPL-CCNC: 27 U/L (ref 0–33)
BILIRUB SERPL-MCNC: 1.7 MG/DL (ref 0.3–1.2)
BUN BLD-MCNC: 22 MG/DL (ref 9–23)
BUN/CREAT SERPL: 24.4 (ref 7–25)
CALCIUM SPEC-SCNC: 9.5 MG/DL (ref 8.7–10.4)
CHLORIDE SERPL-SCNC: 104 MMOL/L (ref 99–109)
CHOLEST SERPL-MCNC: 154 MG/DL (ref 0–200)
CO2 SERPL-SCNC: 28 MMOL/L (ref 20–31)
CREAT BLD-MCNC: 0.9 MG/DL (ref 0.6–1.3)
GFR SERPL CREATININE-BSD FRML MDRD: 86 ML/MIN/1.73
GLOBULIN UR ELPH-MCNC: 1.8 GM/DL
GLUCOSE BLD-MCNC: 80 MG/DL (ref 70–100)
HDLC SERPL-MCNC: 51 MG/DL (ref 40–60)
POTASSIUM BLD-SCNC: 4.4 MMOL/L (ref 3.5–5.5)
PROT SERPL-MCNC: 6.5 G/DL (ref 5.7–8.2)
PSA SERPL-MCNC: 0.71 NG/ML (ref 0–4)
SODIUM BLD-SCNC: 140 MMOL/L (ref 132–146)
TRIGL SERPL-MCNC: 100 MG/DL (ref 0–150)
TSH SERPL DL<=0.05 MIU/L-ACNC: 0.66 MIU/ML (ref 0.35–5.35)

## 2019-01-25 PROCEDURE — 90715 TDAP VACCINE 7 YRS/> IM: CPT | Performed by: INTERNAL MEDICINE

## 2019-01-25 PROCEDURE — 90471 IMMUNIZATION ADMIN: CPT | Performed by: INTERNAL MEDICINE

## 2019-01-25 PROCEDURE — 99396 PREV VISIT EST AGE 40-64: CPT | Performed by: INTERNAL MEDICINE

## 2019-01-25 PROCEDURE — 80061 LIPID PANEL: CPT | Performed by: INTERNAL MEDICINE

## 2019-01-25 PROCEDURE — 84443 ASSAY THYROID STIM HORMONE: CPT | Performed by: INTERNAL MEDICINE

## 2019-01-25 PROCEDURE — G0103 PSA SCREENING: HCPCS | Performed by: INTERNAL MEDICINE

## 2019-01-25 PROCEDURE — 80053 COMPREHEN METABOLIC PANEL: CPT | Performed by: INTERNAL MEDICINE

## 2019-01-25 RX ORDER — INDOMETHACIN 25 MG/1
25 CAPSULE ORAL 3 TIMES DAILY PRN
COMMUNITY
End: 2019-07-01

## 2019-01-25 NOTE — PROGRESS NOTES
Subjective   Spenser Pascal is a 58 y.o. male here for yearly exam. He ate a bowl of cereal this am. He has a granddaughter coming in April, needs Tdap. UTD flu vaccine. Has heard of Shingrix but hasn't attempted to get it. Due for dental exam, been about a year. No complaints today. BP has been better, doesn't take it at home. No negative SE to lisinopril. For RA is just on MTX now and off the humira. Had some GI upset and constipation 2/2 indomethacin.    Review of Systems   Constitutional: Negative.    HENT: Negative.    Eyes: Negative.    Respiratory: Negative.    Cardiovascular: Negative.    Gastrointestinal: Negative.    Endocrine: Negative.    Genitourinary: Negative.    Musculoskeletal: Negative.    Skin: Negative.    Allergic/Immunologic: Negative.    Neurological: Negative.    Hematological: Negative.    Psychiatric/Behavioral: Negative.        Past Medical History:   Diagnosis Date   • Otosclerosis    • Rheumatoid arthritis (CMS/HCC)      Family History   Problem Relation Age of Onset   • Breast cancer Mother    • Hypothyroidism Father    • Parkinsonism Father    • Hypertension Father      Past Surgical History:   Procedure Laterality Date   • LAMINECTOMY     • LIPOMA EXCISION     • SALIVARY GLAND SURGERY       Social History     Socioeconomic History   • Marital status:      Spouse name: Not on file   • Number of children: Not on file   • Years of education: Not on file   • Highest education level: Not on file   Social Needs   • Financial resource strain: Not on file   • Food insecurity - worry: Not on file   • Food insecurity - inability: Not on file   • Transportation needs - medical: Not on file   • Transportation needs - non-medical: Not on file   Occupational History   • Not on file   Tobacco Use   • Smoking status: Never Smoker   • Smokeless tobacco: Never Used   Substance and Sexual Activity   • Alcohol use: Yes     Alcohol/week: 3.0 oz     Types: 5 Glasses of wine per week   • Drug use: No  "  • Sexual activity: Not on file   Other Topics Concern   • Not on file   Social History Narrative   • Not on file         Current Outpatient Medications:   •  folic acid (FOLVITE) 1 MG tablet, Take 2 mg by mouth Daily., Disp: , Rfl:   •  indomethacin (INDOCIN) 25 MG capsule, Take 25 mg by mouth 3 (Three) Times a Day As Needed., Disp: , Rfl:   •  levothyroxine (SYNTHROID, LEVOTHROID) 200 MCG tablet, TAKE 1 TABLET BY MOUTH ONCE DAILY, Disp: 30 tablet, Rfl: 1  •  lisinopril (PRINIVIL,ZESTRIL) 20 MG tablet, TAKE 1 TABLET BY MOUTH ONCE DAILY, Disp: 30 tablet, Rfl: 2  •  METHOTREXATE, ANTI-RHEUMATIC, PO, Take  by mouth 1 (One) Time Per Week., Disp: , Rfl:     Objective   /92   Pulse 55   Temp 97.6 °F (36.4 °C) (Temporal)   Resp 16   Ht 182.9 cm (72\")   Wt 97.4 kg (214 lb 12.8 oz)   SpO2 95%   BMI 29.13 kg/m²   Physical Exam   Constitutional: He is oriented to person, place, and time. He appears well-developed and well-nourished. No distress.   HENT:   Head: Normocephalic and atraumatic.   Right Ear: Tympanic membrane, external ear and ear canal normal.   Left Ear: Tympanic membrane, external ear and ear canal normal.   Nose: Nose normal.   Mouth/Throat: Oropharynx is clear and moist.   Bilateral hearing aids   Eyes: Conjunctivae and lids are normal. Pupils are equal, round, and reactive to light. No scleral icterus.   Neck: Neck supple. Carotid bruit is not present. No thyroid mass and no thyromegaly present.   Cardiovascular: Normal rate, regular rhythm, normal heart sounds and intact distal pulses. Exam reveals no gallop, no S3, no S4 and no friction rub.   No murmur heard.  Pulmonary/Chest: Effort normal and breath sounds normal. No respiratory distress. He has no wheezes. He has no rhonchi. He has no rales.   Abdominal: Soft. Bowel sounds are normal. He exhibits no distension and no mass. There is no hepatosplenomegaly. There is no tenderness.   Musculoskeletal: Normal range of motion.   Lymphadenopathy: "     He has no cervical adenopathy.   Neurological: He is alert and oriented to person, place, and time. No cranial nerve deficit or sensory deficit.   Skin: Skin is warm and dry. No rash noted. No erythema. No pallor.   Psychiatric: He has a normal mood and affect. His speech is normal and behavior is normal. Judgment and thought content normal. Cognition and memory are normal.   Vitals reviewed.      Assessment/Plan   Spenser was seen today for annual exam and immunizations.    Diagnoses and all orders for this visit:    Health care maintenance  -     Comprehensive Metabolic Panel  -     Lipid Panel  -     TSH    Need for Tdap vaccination  -     Tdap Vaccine Greater Than or Equal To 8yo IM    Prostate cancer screening  -     PSA SCREENING        1. HCM  -cscope UTD, q5y schedule  -non-fasting labs today (cereal)  -rec shingrix  -tdap today  -Reviewed the following with the patient: advised patient of need for:  colonoscopy, PSA and/or prostate exam, immunizations discussed, influenza vaccine and Adacel-Tdap vaccine and encouraged patient to exercise 5-7 days per week for 30 minutes at a time. rec he see the dentist as it has been over a year and needs eye exam.

## 2019-02-18 RX ORDER — LEVOTHYROXINE SODIUM 0.2 MG/1
TABLET ORAL
Qty: 30 TABLET | Refills: 1 | Status: SHIPPED | OUTPATIENT
Start: 2019-02-18 | End: 2019-04-22 | Stop reason: SDUPTHER

## 2019-04-22 RX ORDER — LEVOTHYROXINE SODIUM 0.2 MG/1
TABLET ORAL
Qty: 30 TABLET | Refills: 1 | Status: SHIPPED | OUTPATIENT
Start: 2019-04-22 | End: 2019-07-02 | Stop reason: SDUPTHER

## 2019-07-01 ENCOUNTER — OFFICE VISIT (OUTPATIENT)
Dept: INTERNAL MEDICINE | Facility: CLINIC | Age: 59
End: 2019-07-01

## 2019-07-01 VITALS
SYSTOLIC BLOOD PRESSURE: 132 MMHG | BODY MASS INDEX: 28.17 KG/M2 | DIASTOLIC BLOOD PRESSURE: 80 MMHG | TEMPERATURE: 97.5 F | HEIGHT: 72 IN | WEIGHT: 208 LBS

## 2019-07-01 DIAGNOSIS — I10 BENIGN ESSENTIAL HTN: Primary | ICD-10-CM

## 2019-07-01 DIAGNOSIS — M05.79 RHEUMATOID ARTHRITIS INVOLVING MULTIPLE SITES WITH POSITIVE RHEUMATOID FACTOR (HCC): ICD-10-CM

## 2019-07-01 DIAGNOSIS — E03.9 ACQUIRED HYPOTHYROIDISM: ICD-10-CM

## 2019-07-01 LAB
T4 FREE SERPL-MCNC: 1.8 NG/DL (ref 0.93–1.7)
TSH SERPL DL<=0.05 MIU/L-ACNC: 0.43 MIU/ML (ref 0.27–4.2)

## 2019-07-01 PROCEDURE — 84439 ASSAY OF FREE THYROXINE: CPT | Performed by: INTERNAL MEDICINE

## 2019-07-01 PROCEDURE — 99214 OFFICE O/P EST MOD 30 MIN: CPT | Performed by: INTERNAL MEDICINE

## 2019-07-01 PROCEDURE — 84443 ASSAY THYROID STIM HORMONE: CPT | Performed by: INTERNAL MEDICINE

## 2019-07-01 RX ORDER — MEDROXYPROGESTERONE ACETATE 150 MG/ML
INJECTION, SUSPENSION INTRAMUSCULAR
COMMUNITY
Start: 2019-06-12 | End: 2021-11-11

## 2019-07-01 RX ORDER — METHOTREXATE 25 MG/ML
INJECTION INTRA-ARTERIAL; INTRAMUSCULAR; INTRATHECAL; INTRAVENOUS
Refills: 1 | COMMUNITY
Start: 2019-06-08

## 2019-07-01 NOTE — PROGRESS NOTES
"Subjective   Spenser Pascal is a 59 y.o. male here for follow-up HTN, hypothyroidism, and RA. HTN: off lisinopril and exercising regularly. Also eating more healthy and has lost a bit of weight. donates blood regularly and BP has been 130 systolic or less there. Denies HA. Hypothyroidism: has been a bit more irritable recently and wonders if it could be the thyroid. Denies any other thyroid sx. RA: seeing rheum, on MTX inj and Enbrel. Seems to be working well so far. No negative SE. Getting labs drawn q3mo.     The following portions of the patient's history were reviewed and updated as appropriate: allergies, current medications, past medical history, past social history, past surgical history and problem list.    Review of Systems:  General: negative  CV: negative  Respiratory: negative  Neuro: negative  Psych: irritability  MSK/Rheum: RA, arthralgias  Endo: negative    Objective   /80 (BP Location: Left arm, Patient Position: Sitting, Cuff Size: Adult)   Temp 97.5 °F (36.4 °C) (Temporal)   Ht 182.9 cm (72\")   Wt 94.3 kg (208 lb)   BMI 28.21 kg/m²     Physical Exam   Constitutional: He is oriented to person, place, and time. He appears well-developed and well-nourished.   Cardiovascular: Normal rate, regular rhythm and normal heart sounds.   Pulmonary/Chest: Effort normal and breath sounds normal. He has no wheezes. He has no rales.   Neurological: He is alert and oriented to person, place, and time.   Skin: Skin is warm and dry.   Psychiatric: He has a normal mood and affect. His behavior is normal. Thought content normal.   Vitals reviewed.      Assessment/Plan   Spenser was seen today for hypertension and hypothyroidism.    Diagnoses and all orders for this visit:    Benign essential HTN  -off lisinopril, pt prefers to keep on with diet and exercise and hold lisinopril, ok'd him to do that. He donates blood regularly and if BP elevated at those visits pt to let me know    Acquired hypothyroidism  -     " TSH  -     T4, Free    RA with +RF  -continue Enbrel, MTX  -gets q3mo labs per rheum

## 2019-07-02 RX ORDER — LEVOTHYROXINE SODIUM 0.2 MG/1
TABLET ORAL
Qty: 30 TABLET | Refills: 1 | Status: SHIPPED | OUTPATIENT
Start: 2019-07-02 | End: 2019-09-03 | Stop reason: SDUPTHER

## 2019-07-30 RX ORDER — LISINOPRIL 10 MG/1
10 TABLET ORAL DAILY
Qty: 30 TABLET | Refills: 2 | Status: SHIPPED | OUTPATIENT
Start: 2019-07-30 | End: 2019-08-30 | Stop reason: SDUPTHER

## 2019-08-30 RX ORDER — LISINOPRIL 10 MG/1
TABLET ORAL
Qty: 30 TABLET | Refills: 0 | Status: SHIPPED | OUTPATIENT
Start: 2019-08-30 | End: 2019-09-30 | Stop reason: SDUPTHER

## 2019-09-03 RX ORDER — LEVOTHYROXINE SODIUM 0.2 MG/1
TABLET ORAL
Qty: 30 TABLET | Refills: 1 | Status: SHIPPED | OUTPATIENT
Start: 2019-09-03 | End: 2019-11-11 | Stop reason: SDUPTHER

## 2019-09-30 RX ORDER — LISINOPRIL 10 MG/1
TABLET ORAL
Qty: 30 TABLET | Refills: 0 | Status: SHIPPED | OUTPATIENT
Start: 2019-09-30 | End: 2019-11-04 | Stop reason: SDUPTHER

## 2019-11-04 RX ORDER — LISINOPRIL 10 MG/1
TABLET ORAL
Qty: 30 TABLET | Refills: 0 | Status: SHIPPED | OUTPATIENT
Start: 2019-11-04 | End: 2019-12-09 | Stop reason: SDUPTHER

## 2019-11-11 RX ORDER — LEVOTHYROXINE SODIUM 0.2 MG/1
TABLET ORAL
Qty: 30 TABLET | Refills: 1 | Status: SHIPPED | OUTPATIENT
Start: 2019-11-11 | End: 2020-01-20

## 2019-12-09 RX ORDER — LISINOPRIL 10 MG/1
TABLET ORAL
Qty: 30 TABLET | Refills: 0 | Status: SHIPPED | OUTPATIENT
Start: 2019-12-09 | End: 2020-01-10

## 2020-01-06 ENCOUNTER — OFFICE VISIT (OUTPATIENT)
Dept: INTERNAL MEDICINE | Facility: CLINIC | Age: 60
End: 2020-01-06

## 2020-01-06 VITALS
SYSTOLIC BLOOD PRESSURE: 130 MMHG | DIASTOLIC BLOOD PRESSURE: 78 MMHG | HEIGHT: 72 IN | TEMPERATURE: 97.8 F | BODY MASS INDEX: 29.47 KG/M2 | WEIGHT: 217.6 LBS

## 2020-01-06 DIAGNOSIS — M25.561 CHRONIC PAIN OF RIGHT KNEE: ICD-10-CM

## 2020-01-06 DIAGNOSIS — E03.9 ACQUIRED HYPOTHYROIDISM: ICD-10-CM

## 2020-01-06 DIAGNOSIS — I10 BENIGN ESSENTIAL HTN: Primary | ICD-10-CM

## 2020-01-06 DIAGNOSIS — G89.29 CHRONIC PAIN OF RIGHT KNEE: ICD-10-CM

## 2020-01-06 PROCEDURE — 99214 OFFICE O/P EST MOD 30 MIN: CPT | Performed by: INTERNAL MEDICINE

## 2020-01-06 NOTE — PROGRESS NOTES
"Subjective   Spenser Pascal is a 59 y.o. male here for follow-up HTN, chronic right knee pain, hypothyroidism.  HTN: Has noticed blood pressure has been a bit higher.  His diastolic is generally in the upper 80s and systolic in the 130s or 140s.  He denies any headaches or cardiac symptoms.  He does exercise several times a week at the gym doing elliptical, rowing, cycling, weightlifting.  He does not really watch his salt intake but he does not add extra salt to his diet.  He had hoped to lose some weight recently but has not been successful although he has not been intensively trying.  He has chronic right knee pain which seems to be worsening.  He has had orthopedic surgery before.  He would like the names of some orthopedic's that do knees so that he can look into them.  It is progressing over time so he would like to get established with someone should he need knee replacement.  Currently there is achiness around the knee joint that gets worse with activity.  Hypothyroidism: Was seen endocrinologist in the past and was getting yearly thyroid ultrasound.  He wonders if he still needs that.  He denies any throat compressive symptoms, hoarseness, trouble swallowing.    I have reviewed the following portions of the patient's history and confirmed they are accurate: current medications, past medical history, past social history and problem list     I have personally completed the patient's review of systems.    Review of Systems:  General: negative  HEENT: Negative  CV: negative  Respiratory: negative  Neuro: negative  MSK: Knee pain    Objective   /78 (BP Location: Left arm, Patient Position: Sitting, Cuff Size: Large Adult)   Temp 97.8 °F (36.6 °C) (Temporal)   Ht 182.9 cm (72\")   Wt 98.7 kg (217 lb 9.6 oz)   BMI 29.51 kg/m²     Physical Exam   Constitutional: He is oriented to person, place, and time. He appears well-developed and well-nourished.   Cardiovascular: Normal rate, regular rhythm and normal " heart sounds.   Pulmonary/Chest: Effort normal and breath sounds normal. He has no wheezes. He has no rales.   Neurological: He is alert and oriented to person, place, and time.   Skin: Skin is warm and dry.   Psychiatric: He has a normal mood and affect. His behavior is normal. Thought content normal.   Vitals reviewed.      Assessment/Plan   Spenser was seen today for hypertension.    Diagnoses and all orders for this visit:    Benign essential HTN  -Chronic stable.  He may be having some higher values at home.  Have asked him to send me those values via my chart for review.  Would consider changing lisinopril to lisinopril/HCTZ    Chronic pain of right knee  -Gave patient several contacts for orthopedic surgeons.  He is going to look into them and let me know if he needs a referral  -Chronic worsening    Acquired hypothyroidism  -     US Thyroid  -Chronic control, ultrasound for comparison to prior             Gayathri Ricahrds MA    Please note that portions of this note were completed with a voice recognition program. Efforts were made to edit the dictations, but occasionally words are mistranscribed.

## 2020-01-09 ENCOUNTER — HOSPITAL ENCOUNTER (OUTPATIENT)
Dept: ULTRASOUND IMAGING | Facility: HOSPITAL | Age: 60
Discharge: HOME | End: 2020-01-09
Admitting: INTERNAL MEDICINE

## 2020-01-09 PROCEDURE — 76536 US EXAM OF HEAD AND NECK: CPT

## 2020-01-10 RX ORDER — LISINOPRIL 10 MG/1
TABLET ORAL
Qty: 30 TABLET | Refills: 5 | Status: SHIPPED | OUTPATIENT
Start: 2020-01-10 | End: 2020-08-03

## 2020-01-20 RX ORDER — LEVOTHYROXINE SODIUM 0.2 MG/1
TABLET ORAL
Qty: 30 TABLET | Refills: 0 | Status: SHIPPED | OUTPATIENT
Start: 2020-01-20 | End: 2020-02-20

## 2020-02-20 RX ORDER — LEVOTHYROXINE SODIUM 0.2 MG/1
TABLET ORAL
Qty: 30 TABLET | Refills: 6 | Status: SHIPPED | OUTPATIENT
Start: 2020-02-20 | End: 2020-09-22 | Stop reason: SDUPTHER

## 2020-06-23 ENCOUNTER — HOSPITAL ENCOUNTER (OUTPATIENT)
Dept: GENERAL RADIOLOGY | Facility: HOSPITAL | Age: 60
Discharge: HOME | End: 2020-06-23
Admitting: INTERNAL MEDICINE

## 2020-06-23 ENCOUNTER — LAB (OUTPATIENT)
Dept: LAB | Facility: HOSPITAL | Age: 60
End: 2020-06-23

## 2020-06-23 ENCOUNTER — OFFICE VISIT (OUTPATIENT)
Dept: INTERNAL MEDICINE | Facility: CLINIC | Age: 60
End: 2020-06-23

## 2020-06-23 VITALS
SYSTOLIC BLOOD PRESSURE: 130 MMHG | TEMPERATURE: 97.3 F | BODY MASS INDEX: 28.28 KG/M2 | DIASTOLIC BLOOD PRESSURE: 74 MMHG | HEIGHT: 72 IN | WEIGHT: 208.8 LBS

## 2020-06-23 DIAGNOSIS — Z01.818 PRE-OP EVALUATION: Primary | ICD-10-CM

## 2020-06-23 DIAGNOSIS — E03.9 ACQUIRED HYPOTHYROIDISM: ICD-10-CM

## 2020-06-23 LAB
APTT PPP: 30.1 SECONDS (ref 24–37)
BASOPHILS # BLD AUTO: 0.06 10*3/MM3 (ref 0–0.2)
BASOPHILS NFR BLD AUTO: 1.2 % (ref 0–1.5)
BILIRUB UR QL STRIP: NEGATIVE
CLARITY UR: CLEAR
COLOR UR: YELLOW
DEPRECATED RDW RBC AUTO: 46.1 FL (ref 37–54)
EOSINOPHIL # BLD AUTO: 0.06 10*3/MM3 (ref 0–0.4)
EOSINOPHIL NFR BLD AUTO: 1.2 % (ref 0.3–6.2)
ERYTHROCYTE [DISTWIDTH] IN BLOOD BY AUTOMATED COUNT: 13.7 % (ref 12.3–15.4)
GLUCOSE UR STRIP-MCNC: NEGATIVE MG/DL
HBA1C MFR BLD: 5.33 % (ref 4.8–5.6)
HCT VFR BLD AUTO: 43.7 % (ref 37.5–51)
HGB BLD-MCNC: 15.3 G/DL (ref 13–17.7)
HGB UR QL STRIP.AUTO: NEGATIVE
IMM GRANULOCYTES # BLD AUTO: 0.01 10*3/MM3 (ref 0–0.05)
IMM GRANULOCYTES NFR BLD AUTO: 0.2 % (ref 0–0.5)
INR PPP: 1.02 (ref 0.85–1.16)
KETONES UR QL STRIP: NEGATIVE
LEUKOCYTE ESTERASE UR QL STRIP.AUTO: NEGATIVE
LYMPHOCYTES # BLD AUTO: 0.79 10*3/MM3 (ref 0.7–3.1)
LYMPHOCYTES NFR BLD AUTO: 16.4 % (ref 19.6–45.3)
MCH RBC QN AUTO: 32.8 PG (ref 26.6–33)
MCHC RBC AUTO-ENTMCNC: 35 G/DL (ref 31.5–35.7)
MCV RBC AUTO: 93.8 FL (ref 79–97)
MONOCYTES # BLD AUTO: 0.55 10*3/MM3 (ref 0.1–0.9)
MONOCYTES NFR BLD AUTO: 11.4 % (ref 5–12)
NEUTROPHILS # BLD AUTO: 3.35 10*3/MM3 (ref 1.7–7)
NEUTROPHILS NFR BLD AUTO: 69.6 % (ref 42.7–76)
NITRITE UR QL STRIP: NEGATIVE
NRBC BLD AUTO-RTO: 0 /100 WBC (ref 0–0.2)
PH UR STRIP.AUTO: <=5 [PH] (ref 5–8)
PLATELET # BLD AUTO: 275 10*3/MM3 (ref 140–450)
PMV BLD AUTO: 10 FL (ref 6–12)
PREALB SERPL-MCNC: 30.8 MG/DL (ref 20–40)
PROT UR QL STRIP: NEGATIVE
PROTHROMBIN TIME: 13.1 SECONDS (ref 11.5–14)
RBC # BLD AUTO: 4.66 10*6/MM3 (ref 4.14–5.8)
SP GR UR STRIP: >=1.03 (ref 1–1.03)
T4 FREE SERPL-MCNC: 1.66 NG/DL (ref 0.93–1.7)
TSH SERPL DL<=0.05 MIU/L-ACNC: 0.51 UIU/ML (ref 0.27–4.2)
UROBILINOGEN UR QL STRIP: NORMAL
WBC NRBC COR # BLD: 4.82 10*3/MM3 (ref 3.4–10.8)

## 2020-06-23 PROCEDURE — 99242 OFF/OP CONSLTJ NEW/EST SF 20: CPT | Performed by: INTERNAL MEDICINE

## 2020-06-23 PROCEDURE — 84443 ASSAY THYROID STIM HORMONE: CPT | Performed by: INTERNAL MEDICINE

## 2020-06-23 PROCEDURE — 83036 HEMOGLOBIN GLYCOSYLATED A1C: CPT | Performed by: INTERNAL MEDICINE

## 2020-06-23 PROCEDURE — 84439 ASSAY OF FREE THYROXINE: CPT | Performed by: INTERNAL MEDICINE

## 2020-06-23 PROCEDURE — 85730 THROMBOPLASTIN TIME PARTIAL: CPT | Performed by: INTERNAL MEDICINE

## 2020-06-23 PROCEDURE — 85025 COMPLETE CBC W/AUTO DIFF WBC: CPT | Performed by: INTERNAL MEDICINE

## 2020-06-23 PROCEDURE — 81003 URINALYSIS AUTO W/O SCOPE: CPT | Performed by: INTERNAL MEDICINE

## 2020-06-23 PROCEDURE — 84134 ASSAY OF PREALBUMIN: CPT | Performed by: INTERNAL MEDICINE

## 2020-06-23 PROCEDURE — 93000 ELECTROCARDIOGRAM COMPLETE: CPT | Performed by: INTERNAL MEDICINE

## 2020-06-23 PROCEDURE — 85610 PROTHROMBIN TIME: CPT | Performed by: INTERNAL MEDICINE

## 2020-06-23 PROCEDURE — 71046 X-RAY EXAM CHEST 2 VIEWS: CPT

## 2020-06-23 NOTE — PROGRESS NOTES
Subjective   Spenser Pascal is a 60 y.o. male here for pre-op exam. He is having right knee partial replacement on 7/15, with Dr. Ocampo.  He has had multiple surgeries in the past and has never had a problem with any anesthesia.  He has history of benign HTN, RA, hypothyroidism, which is all well managed.  He denies any cardiac or pulmonary complaints.  He eats a regular diet and does get some exercise.  His only real complaint today is right knee pain.    Review of Systems   Constitutional: Negative.    HENT: Negative.    Eyes: Negative.    Respiratory: Negative.    Cardiovascular: Negative.    Gastrointestinal: Negative.    Endocrine: Negative.    Genitourinary: Negative.    Musculoskeletal:        Right knee pain   Skin: Negative.    Allergic/Immunologic: Negative.    Neurological: Negative.    Hematological: Negative.    Psychiatric/Behavioral: Negative.           Past Medical History:   Diagnosis Date   • Otosclerosis    • Rheumatoid arthritis (CMS/HCC)      Family History   Problem Relation Age of Onset   • Breast cancer Mother    • Hypothyroidism Father    • Parkinsonism Father    • Hypertension Father      Past Surgical History:   Procedure Laterality Date   • LAMINECTOMY     • LIPOMA EXCISION     • SALIVARY GLAND SURGERY       Social History     Socioeconomic History   • Marital status:      Spouse name: Not on file   • Number of children: Not on file   • Years of education: Not on file   • Highest education level: Not on file   Tobacco Use   • Smoking status: Never Smoker   • Smokeless tobacco: Never Used   Substance and Sexual Activity   • Alcohol use: Yes     Alcohol/week: 5.0 standard drinks     Types: 5 Glasses of wine per week   • Drug use: No         Current Outpatient Medications:   •  ENBREL SURECLICK 50 MG/ML solution auto-injector, , Disp: , Rfl:   •  folic acid (FOLVITE) 1 MG tablet, Take 2 mg by mouth Daily., Disp: , Rfl:   •  levothyroxine (SYNTHROID, LEVOTHROID) 200 MCG tablet,  "TAKE 1 TABLET BY MOUTH ONCE DAILY, Disp: 30 tablet, Rfl: 6  •  lisinopril (PRINIVIL,ZESTRIL) 10 MG tablet, TAKE 1 TABLET BY MOUTH ONCE DAILY, Disp: 30 tablet, Rfl: 5  •  Methotrexate Sodium (METHOTREXATE PF) 50 MG/2ML chemo syringe, INJECT 0.8ML (20MG) ONCE A WEEK (DISCARD VIAL AFTER EACH INJECTION), Disp: , Rfl: 1    Objective   /74 (BP Location: Left arm, Patient Position: Sitting, Cuff Size: Large Adult)   Temp 97.3 °F (36.3 °C) (Temporal)   Ht 182.9 cm (72\")   Wt 94.7 kg (208 lb 12.8 oz)   BMI 28.32 kg/m²     Physical Exam   Constitutional: He is oriented to person, place, and time. He appears well-developed and well-nourished.   HENT:   Head: Normocephalic and atraumatic.   Nose: Nose normal.   Eyes: Conjunctivae are normal.   Cardiovascular: Normal rate, regular rhythm and normal heart sounds. Exam reveals no gallop and no friction rub.   No murmur heard.  Pulmonary/Chest: Effort normal and breath sounds normal. He has no wheezes.   Musculoskeletal:   Normal gait and station   Neurological: He is alert and oriented to person, place, and time.   Skin: Skin is warm and dry.   Psychiatric: He has a normal mood and affect. His behavior is normal. Judgment and thought content normal.   Vitals reviewed.      ECG 12 Lead  Date/Time: 6/23/2020 12:18 PM  Performed by: Mariposa Palm MD  Authorized by: Mariposa Palm MD   Comparison: compared with previous ECG from 12/26/2017  Rhythm: sinus rhythm  Rate: normal  Conduction: conduction normal  Q waves: aVF    ST Segments: ST segments normal  QRS axis: normal    Clinical impression: non-specific ECG          Assessment/Plan   Spenser was seen today for pre-op exam.    Diagnoses and all orders for this visit:    Pre-op evaluation  -     CBC & Differential  -     Hemoglobin A1c  -     APTT  -     Protime-INR  -     Prealbumin  -     Urinalysis With Culture If Indicated - Urine, Clean Catch  -     XR Chest PA & Lateral  -     CBC Auto " Differential  -low risk for moderate risk surgery, proceed to OR    Acquired hypothyroidism  -     TSH  -     T4, Free                 Please note that portions of this note were completed with a voice recognition program. Efforts were made to edit the dictations, but occasionally words are mistranscribed.

## 2020-08-03 RX ORDER — LISINOPRIL 10 MG/1
TABLET ORAL
Qty: 90 TABLET | Refills: 0 | Status: SHIPPED | OUTPATIENT
Start: 2020-08-03 | End: 2020-09-18

## 2020-09-18 RX ORDER — LISINOPRIL 10 MG/1
TABLET ORAL
Qty: 90 TABLET | Refills: 0 | Status: SHIPPED | OUTPATIENT
Start: 2020-09-18 | End: 2021-02-17

## 2020-09-22 RX ORDER — LEVOTHYROXINE SODIUM 0.2 MG/1
200 TABLET ORAL DAILY
Qty: 30 TABLET | Refills: 6 | Status: SHIPPED | OUTPATIENT
Start: 2020-09-22 | End: 2021-04-23 | Stop reason: SDUPTHER

## 2020-11-04 ENCOUNTER — OFFICE VISIT (OUTPATIENT)
Dept: INTERNAL MEDICINE | Facility: CLINIC | Age: 60
End: 2020-11-04

## 2020-11-04 ENCOUNTER — LAB (OUTPATIENT)
Dept: LAB | Facility: HOSPITAL | Age: 60
End: 2020-11-04

## 2020-11-04 VITALS
TEMPERATURE: 97.1 F | DIASTOLIC BLOOD PRESSURE: 78 MMHG | SYSTOLIC BLOOD PRESSURE: 124 MMHG | WEIGHT: 214.4 LBS | BODY MASS INDEX: 29.04 KG/M2 | HEIGHT: 72 IN

## 2020-11-04 DIAGNOSIS — Z00.00 HEALTH CARE MAINTENANCE: ICD-10-CM

## 2020-11-04 DIAGNOSIS — Z12.5 PROSTATE CANCER SCREENING: Primary | ICD-10-CM

## 2020-11-04 DIAGNOSIS — E55.9 VITAMIN D DEFICIENCY: ICD-10-CM

## 2020-11-04 DIAGNOSIS — Z13.220 LIPID SCREENING: ICD-10-CM

## 2020-11-04 PROCEDURE — G0103 PSA SCREENING: HCPCS | Performed by: INTERNAL MEDICINE

## 2020-11-04 PROCEDURE — 80061 LIPID PANEL: CPT | Performed by: INTERNAL MEDICINE

## 2020-11-04 PROCEDURE — 82306 VITAMIN D 25 HYDROXY: CPT | Performed by: INTERNAL MEDICINE

## 2020-11-04 PROCEDURE — 99396 PREV VISIT EST AGE 40-64: CPT | Performed by: INTERNAL MEDICINE

## 2020-11-04 NOTE — PROGRESS NOTES
Subjective   Spenser Pascal is a 60 y.o. male here for annual exam. No complaints today. Gets daily exercise. Wears sunscreen and avoids unprotected sun exposure. Works at [employer].    Review of Systems   Constitutional: Negative.    HENT: Negative.    Eyes: Negative.    Respiratory: Negative.    Cardiovascular: Negative.    Gastrointestinal: Negative.    Endocrine: Negative.    Genitourinary: Negative.    Musculoskeletal: Negative.    Skin: Negative.    Allergic/Immunologic: Negative.    Neurological: Negative.    Hematological: Negative.    Psychiatric/Behavioral: Negative.           Past Medical History:   Diagnosis Date   • Otosclerosis    • Rheumatoid arthritis (CMS/HCC)      Family History   Problem Relation Age of Onset   • Breast cancer Mother    • Hypothyroidism Father    • Parkinsonism Father    • Hypertension Father      Past Surgical History:   Procedure Laterality Date   • LAMINECTOMY     • LIPOMA EXCISION     • SALIVARY GLAND SURGERY       Social History     Socioeconomic History   • Marital status:      Spouse name: Not on file   • Number of children: Not on file   • Years of education: Not on file   • Highest education level: Not on file   Tobacco Use   • Smoking status: Never Smoker   • Smokeless tobacco: Never Used   Substance and Sexual Activity   • Alcohol use: Yes     Alcohol/week: 5.0 standard drinks     Types: 5 Glasses of wine per week   • Drug use: No         Current Outpatient Medications:   •  ENBREL SURECLICK 50 MG/ML solution auto-injector, , Disp: , Rfl:   •  folic acid (FOLVITE) 1 MG tablet, Take 2 mg by mouth Daily., Disp: , Rfl:   •  levothyroxine (SYNTHROID, LEVOTHROID) 200 MCG tablet, Take 1 tablet by mouth Daily., Disp: 30 tablet, Rfl: 6  •  lisinopril (PRINIVIL,ZESTRIL) 10 MG tablet, Take 1 tablet by mouth once daily, Disp: 90 tablet, Rfl: 0  •  Methotrexate Sodium (METHOTREXATE PF) 50 MG/2ML chemo syringe, INJECT 0.8ML (20MG) ONCE A WEEK (DISCARD VIAL AFTER EACH INJECTION),  "Disp: , Rfl: 1    Objective   /78 (BP Location: Right arm, Patient Position: Sitting, Cuff Size: Adult)   Temp 97.1 °F (36.2 °C) (Temporal)   Ht 182.9 cm (72\")   Wt 97.3 kg (214 lb 6.4 oz)   BMI 29.08 kg/m²   Physical Exam  Vitals signs reviewed.   Constitutional:       General: He is not in acute distress.     Appearance: He is well-developed.   HENT:      Head: Normocephalic and atraumatic.      Right Ear: Tympanic membrane, ear canal and external ear normal.      Left Ear: Tympanic membrane, ear canal and external ear normal.      Nose: Nose normal.   Eyes:      General: Lids are normal. No scleral icterus.     Conjunctiva/sclera: Conjunctivae normal.      Pupils: Pupils are equal, round, and reactive to light.   Neck:      Musculoskeletal: Neck supple.      Thyroid: No thyroid mass or thyromegaly.      Vascular: No carotid bruit.   Cardiovascular:      Rate and Rhythm: Normal rate and regular rhythm.      Heart sounds: Normal heart sounds. No murmur. No friction rub. No gallop. No S3 or S4 sounds.    Pulmonary:      Effort: Pulmonary effort is normal. No respiratory distress.      Breath sounds: Normal breath sounds. No wheezing, rhonchi or rales.   Abdominal:      General: Bowel sounds are normal. There is no distension.      Palpations: Abdomen is soft. There is no mass.      Tenderness: There is no abdominal tenderness.   Musculoskeletal: Normal range of motion.   Lymphadenopathy:      Cervical: No cervical adenopathy.   Skin:     General: Skin is warm and dry.      Coloration: Skin is not pale.      Findings: No erythema or rash.   Neurological:      Mental Status: He is alert and oriented to person, place, and time.      Cranial Nerves: No cranial nerve deficit.      Sensory: No sensory deficit.   Psychiatric:         Speech: Speech normal.         Behavior: Behavior normal.         Thought Content: Thought content normal.         Judgment: Judgment normal.         Assessment/Plan   Diagnoses and " all orders for this visit:    1. Prostate cancer screening (Primary)  -     PSA Screen    2. Lipid screening  -     Lipid Panel    3. Vitamin D deficiency  -     Vitamin D 25 Hydroxy    4. Health care maintenance        1. HCM  -cscope UTD  -non-fasting labs today, other labs recently done reviewed  -flu shot done, tdap UTD  Reviewed the following with the patient: advised patient of need for:  colonoscopy, PSA and/or prostate exam, immunizations discussed, influenza vaccine, Adacel-Tdap vaccine and shingrix, encouraged patient to exercise 5-7 days per week for 30 minutes at a time and ideal body weight discussed with patient.         Counseled regarding: age-appropriate screening labs and tests, wearing seatbelt and sunscreen regularly  Discussed: regular exercise and diet changes to promote healthy weight, checking skin regularly for abnormal moles and lesions    Please note that portions of this note were completed with a voice recognition program. Efforts were made to edit the dictations, but occasionally words are mistranscribed.

## 2020-11-05 DIAGNOSIS — R97.20 INCREASED PROSTATE SPECIFIC ANTIGEN (PSA) VELOCITY: Primary | ICD-10-CM

## 2020-11-05 LAB
25(OH)D3 SERPL-MCNC: 25.2 NG/ML (ref 30–100)
CHOLEST SERPL-MCNC: 140 MG/DL (ref 0–200)
HDLC SERPL-MCNC: 61 MG/DL (ref 40–60)
LDLC SERPL CALC-MCNC: 59 MG/DL (ref 0–100)
LDLC/HDLC SERPL: 0.93 {RATIO}
PSA SERPL-MCNC: 1.21 NG/ML (ref 0–4)
TRIGL SERPL-MCNC: 111 MG/DL (ref 0–150)
VLDLC SERPL-MCNC: 20 MG/DL (ref 5–40)

## 2021-02-17 RX ORDER — LISINOPRIL 10 MG/1
TABLET ORAL
Qty: 90 TABLET | Refills: 3 | Status: SHIPPED | OUTPATIENT
Start: 2021-02-17 | End: 2021-11-11 | Stop reason: SDUPTHER

## 2021-04-23 RX ORDER — LEVOTHYROXINE SODIUM 200 UG/1
TABLET ORAL
Qty: 30 TABLET | Refills: 0 | Status: SHIPPED | OUTPATIENT
Start: 2021-04-23 | End: 2021-05-24 | Stop reason: SDUPTHER

## 2021-05-24 RX ORDER — LEVOTHYROXINE SODIUM 200 UG/1
TABLET ORAL
Qty: 30 TABLET | Refills: 2 | Status: SHIPPED | OUTPATIENT
Start: 2021-05-24 | End: 2021-09-07

## 2021-09-07 RX ORDER — LEVOTHYROXINE SODIUM 200 UG/1
TABLET ORAL
Qty: 30 TABLET | Refills: 0 | Status: SHIPPED | OUTPATIENT
Start: 2021-09-07 | End: 2021-10-11

## 2021-10-11 RX ORDER — LEVOTHYROXINE SODIUM 200 UG/1
TABLET ORAL
Qty: 30 TABLET | Refills: 0 | Status: SHIPPED | OUTPATIENT
Start: 2021-10-11 | End: 2021-11-11 | Stop reason: SDUPTHER

## 2021-11-08 RX ORDER — LEVOTHYROXINE SODIUM 200 UG/1
TABLET ORAL
Qty: 30 TABLET | Refills: 0 | OUTPATIENT
Start: 2021-11-08

## 2021-11-11 ENCOUNTER — OFFICE VISIT (OUTPATIENT)
Dept: INTERNAL MEDICINE | Facility: CLINIC | Age: 61
End: 2021-11-11

## 2021-11-11 ENCOUNTER — LAB (OUTPATIENT)
Dept: LAB | Facility: HOSPITAL | Age: 61
End: 2021-11-11

## 2021-11-11 VITALS
HEIGHT: 72 IN | WEIGHT: 218 LBS | TEMPERATURE: 97.1 F | OXYGEN SATURATION: 98 % | HEART RATE: 76 BPM | BODY MASS INDEX: 29.53 KG/M2 | SYSTOLIC BLOOD PRESSURE: 130 MMHG | DIASTOLIC BLOOD PRESSURE: 72 MMHG

## 2021-11-11 DIAGNOSIS — E78.6 LOW LEVEL OF HIGH DENSITY LIPOPROTEIN (HDL): ICD-10-CM

## 2021-11-11 DIAGNOSIS — I10 BENIGN ESSENTIAL HTN: Primary | ICD-10-CM

## 2021-11-11 DIAGNOSIS — E03.9 ACQUIRED HYPOTHYROIDISM: ICD-10-CM

## 2021-11-11 DIAGNOSIS — I10 BENIGN ESSENTIAL HTN: ICD-10-CM

## 2021-11-11 DIAGNOSIS — E55.9 VITAMIN D DEFICIENCY: ICD-10-CM

## 2021-11-11 DIAGNOSIS — Z12.5 SCREENING FOR PROSTATE CANCER: ICD-10-CM

## 2021-11-11 DIAGNOSIS — M05.79 RHEUMATOID ARTHRITIS INVOLVING MULTIPLE SITES WITH POSITIVE RHEUMATOID FACTOR (HCC): ICD-10-CM

## 2021-11-11 LAB
25(OH)D3 SERPL-MCNC: 27.3 NG/ML
ALBUMIN SERPL-MCNC: 4.5 G/DL (ref 3.5–5.2)
ALBUMIN/GLOB SERPL: 2 G/DL
ALP SERPL-CCNC: 73 U/L (ref 39–117)
ALT SERPL W P-5'-P-CCNC: 29 U/L (ref 1–41)
ANION GAP SERPL CALCULATED.3IONS-SCNC: 7 MMOL/L (ref 5–15)
AST SERPL-CCNC: 20 U/L (ref 1–40)
BASOPHILS # BLD AUTO: 0.07 10*3/MM3 (ref 0–0.2)
BASOPHILS NFR BLD AUTO: 1.3 % (ref 0–1.5)
BILIRUB SERPL-MCNC: 0.8 MG/DL (ref 0–1.2)
BUN SERPL-MCNC: 18 MG/DL (ref 8–23)
BUN/CREAT SERPL: 18.4 (ref 7–25)
CALCIUM SPEC-SCNC: 9.4 MG/DL (ref 8.6–10.5)
CHLORIDE SERPL-SCNC: 103 MMOL/L (ref 98–107)
CHOLEST SERPL-MCNC: 140 MG/DL (ref 0–200)
CO2 SERPL-SCNC: 28 MMOL/L (ref 22–29)
CREAT SERPL-MCNC: 0.98 MG/DL (ref 0.76–1.27)
DEPRECATED RDW RBC AUTO: 47.4 FL (ref 37–54)
EOSINOPHIL # BLD AUTO: 0.09 10*3/MM3 (ref 0–0.4)
EOSINOPHIL NFR BLD AUTO: 1.7 % (ref 0.3–6.2)
ERYTHROCYTE [DISTWIDTH] IN BLOOD BY AUTOMATED COUNT: 13.8 % (ref 12.3–15.4)
GFR SERPL CREATININE-BSD FRML MDRD: 77 ML/MIN/1.73
GLOBULIN UR ELPH-MCNC: 2.2 GM/DL
GLUCOSE SERPL-MCNC: 98 MG/DL (ref 65–99)
HCT VFR BLD AUTO: 45 % (ref 37.5–51)
HDLC SERPL-MCNC: 53 MG/DL (ref 40–60)
HGB BLD-MCNC: 14.6 G/DL (ref 13–17.7)
IMM GRANULOCYTES # BLD AUTO: 0.01 10*3/MM3 (ref 0–0.05)
IMM GRANULOCYTES NFR BLD AUTO: 0.2 % (ref 0–0.5)
LDLC SERPL CALC-MCNC: 73 MG/DL (ref 0–100)
LDLC/HDLC SERPL: 1.38 {RATIO}
LYMPHOCYTES # BLD AUTO: 0.78 10*3/MM3 (ref 0.7–3.1)
LYMPHOCYTES NFR BLD AUTO: 14.7 % (ref 19.6–45.3)
MCH RBC QN AUTO: 30.4 PG (ref 26.6–33)
MCHC RBC AUTO-ENTMCNC: 32.4 G/DL (ref 31.5–35.7)
MCV RBC AUTO: 93.8 FL (ref 79–97)
MONOCYTES # BLD AUTO: 0.71 10*3/MM3 (ref 0.1–0.9)
MONOCYTES NFR BLD AUTO: 13.3 % (ref 5–12)
NEUTROPHILS NFR BLD AUTO: 3.66 10*3/MM3 (ref 1.7–7)
NEUTROPHILS NFR BLD AUTO: 68.8 % (ref 42.7–76)
NRBC BLD AUTO-RTO: 0 /100 WBC (ref 0–0.2)
PLATELET # BLD AUTO: 326 10*3/MM3 (ref 140–450)
PMV BLD AUTO: 9.6 FL (ref 6–12)
POTASSIUM SERPL-SCNC: 4.6 MMOL/L (ref 3.5–5.2)
PROT SERPL-MCNC: 6.7 G/DL (ref 6–8.5)
PSA SERPL-MCNC: 1.23 NG/ML (ref 0–4)
RBC # BLD AUTO: 4.8 10*6/MM3 (ref 4.14–5.8)
SODIUM SERPL-SCNC: 138 MMOL/L (ref 136–145)
TRIGL SERPL-MCNC: 70 MG/DL (ref 0–150)
TSH SERPL DL<=0.05 MIU/L-ACNC: 0.33 UIU/ML (ref 0.27–4.2)
VLDLC SERPL-MCNC: 14 MG/DL (ref 5–40)
WBC # BLD AUTO: 5.32 10*3/MM3 (ref 3.4–10.8)

## 2021-11-11 PROCEDURE — 99214 OFFICE O/P EST MOD 30 MIN: CPT | Performed by: NURSE PRACTITIONER

## 2021-11-11 PROCEDURE — 85025 COMPLETE CBC W/AUTO DIFF WBC: CPT

## 2021-11-11 PROCEDURE — G0103 PSA SCREENING: HCPCS

## 2021-11-11 PROCEDURE — 80061 LIPID PANEL: CPT | Performed by: NURSE PRACTITIONER

## 2021-11-11 PROCEDURE — 84443 ASSAY THYROID STIM HORMONE: CPT | Performed by: NURSE PRACTITIONER

## 2021-11-11 PROCEDURE — 82306 VITAMIN D 25 HYDROXY: CPT

## 2021-11-11 PROCEDURE — 36415 COLL VENOUS BLD VENIPUNCTURE: CPT

## 2021-11-11 PROCEDURE — 80053 COMPREHEN METABOLIC PANEL: CPT | Performed by: NURSE PRACTITIONER

## 2021-11-11 RX ORDER — ERGOCALCIFEROL 1.25 MG/1
50000 CAPSULE ORAL WEEKLY
COMMUNITY

## 2021-11-11 RX ORDER — LISINOPRIL 10 MG/1
10 TABLET ORAL DAILY
Qty: 90 TABLET | Refills: 1 | Status: SHIPPED | OUTPATIENT
Start: 2021-11-11 | End: 2022-06-27

## 2021-11-11 RX ORDER — ABATACEPT 125 MG/ML
INJECTION, SOLUTION SUBCUTANEOUS
COMMUNITY
Start: 2021-10-23

## 2021-11-11 RX ORDER — LEVOTHYROXINE SODIUM 0.2 MG/1
200 TABLET ORAL DAILY
Qty: 90 TABLET | Refills: 1 | Status: SHIPPED | OUTPATIENT
Start: 2021-11-11 | End: 2022-05-17

## 2022-01-12 ENCOUNTER — OFFICE VISIT (OUTPATIENT)
Dept: INTERNAL MEDICINE | Facility: CLINIC | Age: 62
End: 2022-01-12

## 2022-01-12 VITALS
WEIGHT: 215.8 LBS | DIASTOLIC BLOOD PRESSURE: 78 MMHG | SYSTOLIC BLOOD PRESSURE: 122 MMHG | HEART RATE: 63 BPM | OXYGEN SATURATION: 97 % | BODY MASS INDEX: 29.23 KG/M2 | HEIGHT: 72 IN | TEMPERATURE: 97.6 F

## 2022-01-12 DIAGNOSIS — Z00.00 HEALTH CARE MAINTENANCE: Primary | ICD-10-CM

## 2022-01-12 PROCEDURE — 99396 PREV VISIT EST AGE 40-64: CPT | Performed by: INTERNAL MEDICINE

## 2022-01-12 NOTE — PROGRESS NOTES
"Subjective   Spenser Pascal is a 61 y.o. male here for yearly physical.    Review of Systems   Constitutional: Negative.    HENT: Negative.    Eyes: Negative.    Respiratory: Negative.    Cardiovascular: Negative.    Gastrointestinal: Negative.    Endocrine: Negative.    Genitourinary: Negative.    Musculoskeletal: Negative.    Skin: Negative.    Allergic/Immunologic: Negative.    Neurological: Negative.    Hematological: Negative.    Psychiatric/Behavioral: Negative.           Past Medical History:   Diagnosis Date   • Otosclerosis    • Rheumatoid arthritis (HCC)      Family History   Problem Relation Age of Onset   • Breast cancer Mother    • Hypothyroidism Father    • Parkinsonism Father    • Hypertension Father      Past Surgical History:   Procedure Laterality Date   • LAMINECTOMY     • LIPOMA EXCISION     • SALIVARY GLAND SURGERY       Social History     Socioeconomic History   • Marital status:    Tobacco Use   • Smoking status: Never Smoker   • Smokeless tobacco: Never Used   Substance and Sexual Activity   • Alcohol use: Yes     Alcohol/week: 5.0 standard drinks     Types: 5 Glasses of wine per week   • Drug use: No         Current Outpatient Medications:   •  folic acid (FOLVITE) 1 MG tablet, Take 2 mg by mouth Daily., Disp: , Rfl:   •  levothyroxine (Euthyrox) 200 MCG tablet, Take 1 tablet by mouth Daily., Disp: 90 tablet, Rfl: 1  •  lisinopril (PRINIVIL,ZESTRIL) 10 MG tablet, Take 1 tablet by mouth Daily., Disp: 90 tablet, Rfl: 1  •  Methotrexate Sodium (METHOTREXATE PF) 50 MG/2ML chemo syringe, INJECT 0.8ML (20MG) ONCE A WEEK (DISCARD VIAL AFTER EACH INJECTION), Disp: , Rfl: 1  •  Orencia ClickJect 125 MG/ML solution auto-injector, , Disp: , Rfl:   •  vitamin D (ERGOCALCIFEROL) 1.25 MG (25237 UT) capsule capsule, Take 50,000 Units by mouth 1 (One) Time Per Week., Disp: , Rfl:     Objective   /78   Pulse 63   Temp 97.6 °F (36.4 °C) (Temporal)   Ht 182.9 cm (72\")   Wt 97.9 kg (215 lb 12.8 " oz)   SpO2 97%   BMI 29.27 kg/m²   Physical Exam  Vitals reviewed.   Constitutional:       General: He is not in acute distress.     Appearance: He is well-developed.   HENT:      Head: Normocephalic and atraumatic.      Right Ear: Tympanic membrane, ear canal and external ear normal.      Left Ear: Tympanic membrane, ear canal and external ear normal.      Nose: Nose normal.      Mouth/Throat:      Lips: Pink.      Mouth: Mucous membranes are moist.      Tongue: No lesions.      Pharynx: Oropharynx is clear.   Eyes:      General: Lids are normal. Vision grossly intact. No scleral icterus.     Extraocular Movements: Extraocular movements intact.      Conjunctiva/sclera: Conjunctivae normal.      Pupils: Pupils are equal, round, and reactive to light.   Neck:      Thyroid: No thyroid mass or thyromegaly.      Vascular: No carotid bruit.   Cardiovascular:      Rate and Rhythm: Normal rate and regular rhythm.      Pulses:           Dorsalis pedis pulses are 2+ on the right side and 2+ on the left side.        Posterior tibial pulses are 2+ on the right side and 2+ on the left side.      Heart sounds: Normal heart sounds. No murmur heard.  No friction rub. No gallop. No S3 or S4 sounds.    Pulmonary:      Effort: Pulmonary effort is normal. No respiratory distress.      Breath sounds: Normal breath sounds. No wheezing, rhonchi or rales.   Abdominal:      General: Bowel sounds are normal. There is no distension.      Palpations: Abdomen is soft. There is no mass.      Tenderness: There is no abdominal tenderness.   Musculoskeletal:         General: Normal range of motion.      Cervical back: Neck supple.      Right lower leg: No edema.      Left lower leg: No edema.   Lymphadenopathy:      Cervical: No cervical adenopathy.   Skin:     General: Skin is warm and dry.      Coloration: Skin is not pale.      Findings: No erythema or rash.   Neurological:      Mental Status: He is alert and oriented to person, place, and  time.      Cranial Nerves: No cranial nerve deficit.      Sensory: No sensory deficit.      Gait: Gait is intact.   Psychiatric:         Attention and Perception: Attention normal.         Mood and Affect: Mood normal.         Speech: Speech normal.         Behavior: Behavior normal.         Thought Content: Thought content normal.         Judgment: Judgment normal.         Assessment/Plan   Diagnoses and all orders for this visit:    1. Health care maintenance (Primary)        1. Health Care Maintenance  -PSA reviewed, below 1  -cscope UTD  -all vaccines UTD  -fasting labs today  -Patient's Body mass index is 29.27 kg/m². indicating that he is overweight (BMI 25-29.9). Obesity-related health conditions include the following: none. Obesity is unchanged. BMI is is above average; BMI management plan is completed. We discussed portion control and increasing exercise..    Reviewed the following with the patient: advised patient of need for:  colonoscopy, PSA and/or prostate exam and immunizations discussed and encouraged patient to exercise 5-7 days per week for 30 minutes at a time.  Counseled regarding: age-appropriate screening labs and tests, wearing seatbelt and sunscreen regularly  Discussed: regular exercise and diet changes to promote healthy weight, checking skin regularly for abnormal moles and lesions

## 2022-05-17 DIAGNOSIS — E03.9 ACQUIRED HYPOTHYROIDISM: ICD-10-CM

## 2022-05-17 RX ORDER — LEVOTHYROXINE SODIUM 200 UG/1
TABLET ORAL
Qty: 60 TABLET | Refills: 0 | Status: SHIPPED | OUTPATIENT
Start: 2022-05-17 | End: 2022-06-22

## 2022-06-13 ENCOUNTER — TELEPHONE (OUTPATIENT)
Dept: INTERNAL MEDICINE | Facility: CLINIC | Age: 62
End: 2022-06-13

## 2022-06-13 NOTE — TELEPHONE ENCOUNTER
He is a great candidate for this.  I have prescribed it.  I recommend that he ask his pharmacist if all of his medications are okay to take with Paxlovid as the interaction list is vast.  I do not know if his pharmacy will carry it.  Also warned that he can have rebound covid symptoms following taking this medication.

## 2022-06-13 NOTE — TELEPHONE ENCOUNTER
Caller: Spenser Pascal    Relationship: Self    Best call back number: [phone]    What medication are you requesting: PAXLOVID    What are your current symptoms: FEVER, COUGH, CHILLS, CONGESTION    How long have you been experiencing symptoms: 2 DAYS    Have you had these symptoms before:    [] Yes  [x] No    Have you been treated for these symptoms before:   [] Yes  [x] No    If a prescription is needed, what is your preferred pharmacy and phone number: [id number] - [facility] - [place], [place] - [address] [facility] [address] [facility] [phone] [facility] [phone] FX     Additional notes:PATIENT STATES THAT HE TESTED POSITIVE THROUGH [facility] AND IS REQUESTING MEDICATION DUE TO AGE AND HEALTH CONDITIONS

## 2022-06-21 DIAGNOSIS — E03.9 ACQUIRED HYPOTHYROIDISM: ICD-10-CM

## 2022-06-22 RX ORDER — LEVOTHYROXINE SODIUM 200 UG/1
TABLET ORAL
Qty: 60 TABLET | Refills: 0 | Status: SHIPPED | OUTPATIENT
Start: 2022-06-22 | End: 2022-09-26

## 2022-06-25 DIAGNOSIS — I10 BENIGN ESSENTIAL HTN: ICD-10-CM

## 2022-06-27 RX ORDER — LISINOPRIL 10 MG/1
TABLET ORAL
Qty: 90 TABLET | Refills: 0 | Status: SHIPPED | OUTPATIENT
Start: 2022-06-27 | End: 2022-09-06

## 2022-09-03 DIAGNOSIS — I10 BENIGN ESSENTIAL HTN: ICD-10-CM

## 2022-09-06 RX ORDER — LISINOPRIL 10 MG/1
TABLET ORAL
Qty: 90 TABLET | Refills: 0 | Status: SHIPPED | OUTPATIENT
Start: 2022-09-06 | End: 2022-12-19

## 2022-09-26 DIAGNOSIS — E03.9 ACQUIRED HYPOTHYROIDISM: ICD-10-CM

## 2022-09-26 RX ORDER — LEVOTHYROXINE SODIUM 0.2 MG/1
TABLET ORAL
Qty: 60 TABLET | Refills: 2 | Status: SHIPPED | OUTPATIENT
Start: 2022-09-26

## 2022-12-19 DIAGNOSIS — I10 BENIGN ESSENTIAL HTN: ICD-10-CM

## 2022-12-19 RX ORDER — LISINOPRIL 10 MG/1
TABLET ORAL
Qty: 90 TABLET | Refills: 0 | Status: SHIPPED | OUTPATIENT
Start: 2022-12-19 | End: 2023-03-31

## 2023-01-25 ENCOUNTER — TELEPHONE (OUTPATIENT)
Dept: INTERNAL MEDICINE | Facility: CLINIC | Age: 63
End: 2023-01-25

## 2023-01-25 NOTE — TELEPHONE ENCOUNTER
Caller:NURYS REGINAKIKI    Phone Number: [phone]    Reason for Call: PHYSICAL APPOINTMENT SCHEDULED WITH YESSICA PATEL WHO IS NOT THE PATIENTS PCP . PATIENTS PCP DID NOT HAVE ANYTHING UNTIL 7/18/23

## 2023-02-03 ENCOUNTER — OFFICE VISIT (OUTPATIENT)
Dept: INTERNAL MEDICINE | Facility: CLINIC | Age: 63
End: 2023-02-03
Payer: COMMERCIAL

## 2023-02-03 ENCOUNTER — LAB (OUTPATIENT)
Dept: LAB | Facility: HOSPITAL | Age: 63
End: 2023-02-03
Payer: COMMERCIAL

## 2023-02-03 VITALS
WEIGHT: 212.2 LBS | BODY MASS INDEX: 28.74 KG/M2 | SYSTOLIC BLOOD PRESSURE: 126 MMHG | OXYGEN SATURATION: 99 % | HEART RATE: 69 BPM | TEMPERATURE: 97.1 F | DIASTOLIC BLOOD PRESSURE: 72 MMHG | HEIGHT: 72 IN

## 2023-02-03 DIAGNOSIS — G89.29 CHRONIC BILATERAL LOW BACK PAIN WITH LEFT-SIDED SCIATICA: ICD-10-CM

## 2023-02-03 DIAGNOSIS — M54.42 CHRONIC BILATERAL LOW BACK PAIN WITH LEFT-SIDED SCIATICA: ICD-10-CM

## 2023-02-03 DIAGNOSIS — Z12.5 SCREENING FOR PROSTATE CANCER: ICD-10-CM

## 2023-02-03 DIAGNOSIS — E03.9 ACQUIRED HYPOTHYROIDISM: ICD-10-CM

## 2023-02-03 DIAGNOSIS — Z00.00 ROUTINE PHYSICAL EXAMINATION: Primary | ICD-10-CM

## 2023-02-03 DIAGNOSIS — E55.9 VITAMIN D DEFICIENCY: ICD-10-CM

## 2023-02-03 DIAGNOSIS — L85.3 DRY SKIN: ICD-10-CM

## 2023-02-03 DIAGNOSIS — Z12.11 SCREENING FOR COLON CANCER: ICD-10-CM

## 2023-02-03 LAB
25(OH)D3 SERPL-MCNC: 29.8 NG/ML (ref 30–100)
ALBUMIN SERPL-MCNC: 4.7 G/DL (ref 3.5–5.2)
ALBUMIN/GLOB SERPL: 2 G/DL
ALP SERPL-CCNC: 79 U/L (ref 39–117)
ALT SERPL W P-5'-P-CCNC: 42 U/L (ref 1–41)
ANION GAP SERPL CALCULATED.3IONS-SCNC: 11.1 MMOL/L (ref 5–15)
AST SERPL-CCNC: 18 U/L (ref 1–40)
BASOPHILS # BLD AUTO: 0.07 10*3/MM3 (ref 0–0.2)
BASOPHILS NFR BLD AUTO: 1.1 % (ref 0–1.5)
BILIRUB SERPL-MCNC: 0.9 MG/DL (ref 0–1.2)
BUN SERPL-MCNC: 19 MG/DL (ref 8–23)
BUN/CREAT SERPL: 19.2 (ref 7–25)
CALCIUM SPEC-SCNC: 9.8 MG/DL (ref 8.6–10.5)
CHLORIDE SERPL-SCNC: 104 MMOL/L (ref 98–107)
CO2 SERPL-SCNC: 25.9 MMOL/L (ref 22–29)
CREAT SERPL-MCNC: 0.99 MG/DL (ref 0.76–1.27)
DEPRECATED RDW RBC AUTO: 48.5 FL (ref 37–54)
EGFRCR SERPLBLD CKD-EPI 2021: 85.1 ML/MIN/1.73
EOSINOPHIL # BLD AUTO: 0.11 10*3/MM3 (ref 0–0.4)
EOSINOPHIL NFR BLD AUTO: 1.7 % (ref 0.3–6.2)
ERYTHROCYTE [DISTWIDTH] IN BLOOD BY AUTOMATED COUNT: 15 % (ref 12.3–15.4)
GLOBULIN UR ELPH-MCNC: 2.4 GM/DL
GLUCOSE SERPL-MCNC: 89 MG/DL (ref 65–99)
HBA1C MFR BLD: 5.7 % (ref 4.8–5.6)
HCT VFR BLD AUTO: 44.6 % (ref 37.5–51)
HGB BLD-MCNC: 15.4 G/DL (ref 13–17.7)
IMM GRANULOCYTES # BLD AUTO: 0.04 10*3/MM3 (ref 0–0.05)
IMM GRANULOCYTES NFR BLD AUTO: 0.6 % (ref 0–0.5)
LYMPHOCYTES # BLD AUTO: 0.74 10*3/MM3 (ref 0.7–3.1)
LYMPHOCYTES NFR BLD AUTO: 11.2 % (ref 19.6–45.3)
MCH RBC QN AUTO: 31.4 PG (ref 26.6–33)
MCHC RBC AUTO-ENTMCNC: 34.5 G/DL (ref 31.5–35.7)
MCV RBC AUTO: 91 FL (ref 79–97)
MONOCYTES # BLD AUTO: 0.78 10*3/MM3 (ref 0.1–0.9)
MONOCYTES NFR BLD AUTO: 11.8 % (ref 5–12)
NEUTROPHILS NFR BLD AUTO: 4.89 10*3/MM3 (ref 1.7–7)
NEUTROPHILS NFR BLD AUTO: 73.6 % (ref 42.7–76)
NRBC BLD AUTO-RTO: 0 /100 WBC (ref 0–0.2)
PLATELET # BLD AUTO: 249 10*3/MM3 (ref 140–450)
PMV BLD AUTO: 9.9 FL (ref 6–12)
POTASSIUM SERPL-SCNC: 5 MMOL/L (ref 3.5–5.2)
PROT SERPL-MCNC: 7.1 G/DL (ref 6–8.5)
PSA SERPL-MCNC: 1.46 NG/ML (ref 0–4)
RBC # BLD AUTO: 4.9 10*6/MM3 (ref 4.14–5.8)
SODIUM SERPL-SCNC: 141 MMOL/L (ref 136–145)
T4 FREE SERPL-MCNC: 1.56 NG/DL (ref 0.93–1.7)
TSH SERPL DL<=0.05 MIU/L-ACNC: 1.59 UIU/ML (ref 0.27–4.2)
WBC NRBC COR # BLD: 6.63 10*3/MM3 (ref 3.4–10.8)

## 2023-02-03 PROCEDURE — 80050 GENERAL HEALTH PANEL: CPT | Performed by: NURSE PRACTITIONER

## 2023-02-03 PROCEDURE — G0103 PSA SCREENING: HCPCS | Performed by: NURSE PRACTITIONER

## 2023-02-03 PROCEDURE — 36415 COLL VENOUS BLD VENIPUNCTURE: CPT | Performed by: NURSE PRACTITIONER

## 2023-02-03 PROCEDURE — 83036 HEMOGLOBIN GLYCOSYLATED A1C: CPT | Performed by: NURSE PRACTITIONER

## 2023-02-03 PROCEDURE — 82306 VITAMIN D 25 HYDROXY: CPT | Performed by: NURSE PRACTITIONER

## 2023-02-03 PROCEDURE — 84439 ASSAY OF FREE THYROXINE: CPT | Performed by: NURSE PRACTITIONER

## 2023-02-03 PROCEDURE — 99396 PREV VISIT EST AGE 40-64: CPT | Performed by: NURSE PRACTITIONER

## 2023-02-03 NOTE — PROGRESS NOTES
Subjective   Chief Complaint   Patient presents with   • Annual Exam        Spenser Pascal is a 62 y.o. male here today for annual exam.    Overall healthy: Yes  Regular exercise:  Yes, has cut back because he's having back pain, scheduled for laminectomy  Diet is well balanced:  Yes  Vitamin Supplement:  No  Alcohol intake: 4 drinks a week   Tobacco use:  No     Cardiovascular risk is low:  Yes   PSA: Due  ED or bedroom issues: No  Concern for STDs:  No  Last colon screening:  Done in 2018, due this year  Regular dental exam:  Yes   Regular eye exam:  Yes, wears glasses  Immunizations up to date:  Yes  Wear a seatbelt regularly:  Yes  Wear sunscreen regularly when outdoors:  Yes    Review of Systems   Constitutional: Negative for activity change, appetite change and fatigue.   HENT: Negative for congestion.    Respiratory: Negative for cough and shortness of breath.    Cardiovascular: Negative for chest pain and leg swelling.   Gastrointestinal: Negative for abdominal pain.   Musculoskeletal: Positive for arthralgias and back pain.   Neurological: Negative for dizziness, weakness and confusion.   Psychiatric/Behavioral: Negative for behavioral problems and decreased concentration.       The following portions of the patient's history were reviewed and updated as appropriate: allergies, current medications, past family history, past medical history, past social history, past surgical history and problem list.    Past Medical History:   Diagnosis Date   • Otosclerosis    • Rheumatoid arthritis (HCC)      Past Surgical History:   Procedure Laterality Date   • LAMINECTOMY     • LIPOMA EXCISION     • SALIVARY GLAND SURGERY       Family History   Problem Relation Age of Onset   • Breast cancer Mother    • Hypothyroidism Father    • Parkinsonism Father    • Hypertension Father      Social History     Tobacco Use   Smoking Status Never   Smokeless Tobacco Never     Social History     Substance and Sexual Activity   Alcohol  "Use Yes   • Alcohol/week: 5.0 standard drinks   • Types: 5 Glasses of wine per week     No Known Allergies    Current Outpatient Medications on File Prior to Visit   Medication Sig   • folic acid (FOLVITE) 1 MG tablet Take 2 mg by mouth Daily.   • levothyroxine (SYNTHROID, LEVOTHROID) 200 MCG tablet Take 1 tablet by mouth once daily   • lisinopril (PRINIVIL,ZESTRIL) 10 MG tablet Take 1 tablet by mouth once daily   • Methotrexate Sodium (METHOTREXATE PF) 50 MG/2ML chemo syringe INJECT 0.8ML (20MG) ONCE A WEEK (DISCARD VIAL AFTER EACH INJECTION)   • Orencia ClickJect 125 MG/ML solution auto-injector    • vitamin D (ERGOCALCIFEROL) 1.25 MG (00439 UT) capsule capsule Take 50,000 Units by mouth 1 (One) Time Per Week.     No current facility-administered medications on file prior to visit.       Objective   Vitals:    02/03/23 0939   BP: 126/72   BP Location: Left arm   Patient Position: Sitting   Pulse: 69   Temp: 97.1 °F (36.2 °C)   SpO2: 99%   Weight: 96.3 kg (212 lb 3.2 oz)   Height: 182.9 cm (72\")     Body mass index is 28.78 kg/m².    Physical Exam  Vitals and nursing note reviewed.   Constitutional:       Appearance: Normal appearance.   HENT:      Head: Normocephalic.      Right Ear: External ear normal. Tympanic membrane is scarred.      Left Ear: External ear normal. Tympanic membrane is scarred.      Ears:      Comments: Wearing hearing aids   Dry skin noted in both ear canals  Eyes:      Extraocular Movements: Extraocular movements intact.      Conjunctiva/sclera: Conjunctivae normal.      Pupils: Pupils are equal, round, and reactive to light.      Comments: Wearing glasses   Neck:      Thyroid: No thyromegaly.      Vascular: No carotid bruit.   Cardiovascular:      Rate and Rhythm: Normal rate and regular rhythm.      Pulses: Normal pulses.           Carotid pulses are 2+ on the right side and 2+ on the left side.     Heart sounds: Normal heart sounds. No murmur heard.  Pulmonary:      Effort: Pulmonary " effort is normal.      Breath sounds: Normal breath sounds.   Abdominal:      General: Bowel sounds are normal. There is no distension.      Palpations: Abdomen is soft.      Tenderness: There is no abdominal tenderness.      Hernia: A hernia (right inguinal) is present.   Musculoskeletal:      Lumbar back: Decreased range of motion (gait slowed).      Right lower leg: No edema.      Left lower leg: No edema.   Lymphadenopathy:      Cervical: No cervical adenopathy.   Skin:     General: Skin is warm and dry.      Capillary Refill: Capillary refill takes less than 2 seconds.   Neurological:      General: No focal deficit present.      Mental Status: He is alert and oriented to person, place, and time.      GCS: GCS eye subscore is 4. GCS verbal subscore is 5. GCS motor subscore is 6.      Motor: Motor function is intact.      Gait: Gait is intact.   Psychiatric:         Attention and Perception: Attention normal.         Mood and Affect: Mood normal.         Behavior: Behavior normal.         Thought Content: Thought content normal.         Cognition and Memory: Cognition and memory normal.         Judgment: Judgment normal.         BMI is >= 25 and <30. (Overweight) The following options were offered after discussion;: nutrition counseling/recommendations       Assessment & Plan     Current Outpatient Medications:   •  folic acid (FOLVITE) 1 MG tablet, Take 2 mg by mouth Daily., Disp: , Rfl:   •  levothyroxine (SYNTHROID, LEVOTHROID) 200 MCG tablet, Take 1 tablet by mouth once daily, Disp: 60 tablet, Rfl: 2  •  lisinopril (PRINIVIL,ZESTRIL) 10 MG tablet, Take 1 tablet by mouth once daily, Disp: 90 tablet, Rfl: 0  •  Methotrexate Sodium (METHOTREXATE PF) 50 MG/2ML chemo syringe, INJECT 0.8ML (20MG) ONCE A WEEK (DISCARD VIAL AFTER EACH INJECTION), Disp: , Rfl: 1  •  Orencia ClickJect 125 MG/ML solution auto-injector, , Disp: , Rfl:   •  vitamin D (ERGOCALCIFEROL) 1.25 MG (24930 UT) capsule capsule, Take 50,000 Units  by mouth 1 (One) Time Per Week., Disp: , Rfl:     Problem List Items Addressed This Visit        Endocrine and Metabolic    Acquired hypothyroidism    Relevant Orders    TSH    T4, free   Other Visit Diagnoses     Routine physical examination    -  Primary    Relevant Orders    CBC w AUTO Differential    Comprehensive Metabolic Panel    Hemoglobin A1c    Screening for prostate cancer        Relevant Orders    PSA Screen    Vitamin D deficiency        Relevant Orders    Vitamin D 25 hydroxy    Screening for colon cancer        Relevant Orders    Ambulatory Referral For Screening Colonoscopy    Dry skin        Chronic bilateral low back pain with left-sided sciatica             Labs today  Order colonoscopy later this year  Has upcoming appt for laminectomy  Recommend Hydrocortisone cream on qtip for dry skin in ear canals          Counseling was given to patient for the following topics: appropriate exercise, healthy eating habits, disease prevention and importance of self testicular exam.     Plan of care reviewed with the patient at the conclusion of today's visit.  Education was provided regarding diagnosis, management, and any prescribed or recommended OTC medications.  Patient verbalized understanding of and agreement with management plan.     Return in about 1 year (around 2/3/2024) for Medicare Wellness.        MOON Wu

## 2023-02-08 ENCOUNTER — TELEPHONE (OUTPATIENT)
Dept: INTERNAL MEDICINE | Facility: CLINIC | Age: 63
End: 2023-02-08

## 2023-02-08 NOTE — TELEPHONE ENCOUNTER
Caller: Spenser Pascal    Relationship: Self    Best call back number: [phone]    Who is your current provider: VINCENT VERGARA    Who would you like your new provider to be: YESSICA PENALOZA    What are your reasons for transferring care: NO COMPLAINTS BUT HARD TO GET IN WITH [facility] OR OFTEN HAVE A CALL TO RESCHEDULE    Additional notes: PATIENT HAS AN APPOINTMENT ON 2/8/2024 FOR A MEDICARE WELLNESS AND WOULD LIKE TO GET THAT CHANGED TO YESSICA NORMAN AS WELL AS CHANGE TO HIS PCP

## 2023-02-24 ENCOUNTER — TELEPHONE (OUTPATIENT)
Dept: INTERNAL MEDICINE | Facility: CLINIC | Age: 63
End: 2023-02-24
Payer: COMMERCIAL

## 2023-03-31 DIAGNOSIS — I10 BENIGN ESSENTIAL HTN: ICD-10-CM

## 2023-03-31 RX ORDER — LISINOPRIL 10 MG/1
TABLET ORAL
Qty: 90 TABLET | Refills: 0 | Status: SHIPPED | OUTPATIENT
Start: 2023-03-31

## 2023-04-24 DIAGNOSIS — E03.9 ACQUIRED HYPOTHYROIDISM: ICD-10-CM

## 2023-04-24 RX ORDER — LEVOTHYROXINE SODIUM 0.2 MG/1
TABLET ORAL
Qty: 90 TABLET | Refills: 0 | Status: SHIPPED | OUTPATIENT
Start: 2023-04-24

## 2023-10-04 RX ORDER — SODIUM, POTASSIUM,MAG SULFATES 17.5-3.13G
1 SOLUTION, RECONSTITUTED, ORAL ORAL TAKE AS DIRECTED
Qty: 354 ML | Refills: 0 | Status: SHIPPED | OUTPATIENT
Start: 2023-10-04

## 2023-10-12 ENCOUNTER — OUTSIDE FACILITY SERVICE (OUTPATIENT)
Dept: GASTROENTEROLOGY | Facility: CLINIC | Age: 63
End: 2023-10-12
Payer: COMMERCIAL

## 2023-10-12 PROCEDURE — 45378 DIAGNOSTIC COLONOSCOPY: CPT | Performed by: INTERNAL MEDICINE

## 2023-10-23 DIAGNOSIS — E03.9 ACQUIRED HYPOTHYROIDISM: ICD-10-CM

## 2023-10-23 DIAGNOSIS — I10 BENIGN ESSENTIAL HTN: ICD-10-CM

## 2023-10-23 RX ORDER — LISINOPRIL 10 MG/1
TABLET ORAL
Qty: 90 TABLET | Refills: 0 | Status: SHIPPED | OUTPATIENT
Start: 2023-10-23

## 2023-10-23 RX ORDER — LEVOTHYROXINE SODIUM 0.2 MG/1
TABLET ORAL
Qty: 90 TABLET | Refills: 0 | Status: SHIPPED | OUTPATIENT
Start: 2023-10-23

## 2024-01-29 ENCOUNTER — OFFICE VISIT (OUTPATIENT)
Dept: INTERNAL MEDICINE | Facility: CLINIC | Age: 64
End: 2024-01-29
Payer: COMMERCIAL

## 2024-01-29 VITALS
HEART RATE: 71 BPM | SYSTOLIC BLOOD PRESSURE: 120 MMHG | WEIGHT: 217 LBS | OXYGEN SATURATION: 99 % | TEMPERATURE: 98 F | BODY MASS INDEX: 29.39 KG/M2 | DIASTOLIC BLOOD PRESSURE: 78 MMHG | HEIGHT: 72 IN

## 2024-01-29 DIAGNOSIS — J06.9 ACUTE UPPER RESPIRATORY INFECTION: ICD-10-CM

## 2024-01-29 DIAGNOSIS — I10 BENIGN ESSENTIAL HTN: Primary | ICD-10-CM

## 2024-01-29 DIAGNOSIS — R49.0 HOARSENESS OF VOICE: ICD-10-CM

## 2024-01-29 PROCEDURE — 99214 OFFICE O/P EST MOD 30 MIN: CPT | Performed by: NURSE PRACTITIONER

## 2024-01-29 RX ORDER — AZITHROMYCIN 250 MG/1
TABLET, FILM COATED ORAL
Qty: 6 TABLET | Refills: 0 | Status: SHIPPED | OUTPATIENT
Start: 2024-01-29

## 2024-01-29 NOTE — PROGRESS NOTES
"Chief Complaint   Patient presents with    Cough    Hypertension       HPI  Spenser Pascal is a 63 y.o. male presents for with HTN.  He states he had a rheumatology appt last week and his SBP was in the 150s.   Yesterday his BP was 145/100.  He denies and CP/SOB.  Pt takes blood pressure on a daily basis.  Pt also complains of cough for about 2 weeks.  Coughing up yellow phlegm.  He gets SOB with \"coughing fits\".  Starting to lose his voice.  Has been taking Mucinex OTC.    The following portions of the patient's history were reviewed and updated as appropriate: allergies, current medications, past family history, past medical history, past social history, past surgical history, and problem list.    Subjective  Review of Systems   Constitutional:  Negative for activity change, appetite change, chills, fatigue and fever.   HENT:  Positive for congestion.    Respiratory:  Positive for cough. Negative for shortness of breath and wheezing.    Cardiovascular:  Negative for chest pain and leg swelling.   Gastrointestinal:  Negative for abdominal pain.   Musculoskeletal:  Positive for arthralgias.   Neurological:  Negative for dizziness, weakness and confusion.   Psychiatric/Behavioral:  Negative for behavioral problems and decreased concentration.        Objective  Visit Vitals  /78 (BP Location: Left arm, Patient Position: Sitting)   Pulse 71   Temp 98 °F (36.7 °C)   Ht 182.9 cm (72\")   Wt 98.4 kg (217 lb)   SpO2 99%   BMI 29.43 kg/m²        Physical Exam  Vitals and nursing note reviewed.   HENT:      Head: Normocephalic.      Right Ear: Ear canal normal. Tympanic membrane is not erythematous.      Left Ear: Ear canal normal. Tympanic membrane is not erythematous.      Ears:      Comments: Both TM's dull  Eyes:      Pupils: Pupils are equal, round, and reactive to light.   Cardiovascular:      Rate and Rhythm: Normal rate and regular rhythm.      Pulses: Normal pulses.      Heart sounds: Normal heart sounds. "   Pulmonary:      Effort: Pulmonary effort is normal. No respiratory distress.      Breath sounds: Normal breath sounds. No wheezing, rhonchi or rales.   Skin:     General: Skin is warm and dry.      Capillary Refill: Capillary refill takes less than 2 seconds.   Neurological:      General: No focal deficit present.      Mental Status: He is alert and oriented to person, place, and time.      Gait: Gait is intact.   Psychiatric:         Attention and Perception: Attention normal.         Mood and Affect: Mood normal.         Behavior: Behavior normal.          Procedures     Assessment and Plan  Diagnoses and all orders for this visit:    1. Benign essential HTN (Primary)    2. Acute upper respiratory infection  -     azithromycin (Zithromax Z-Lionel) 250 MG tablet; Take 2 tablets by mouth on day 1, then 1 tablet daily on days 2-5  Dispense: 6 tablet; Refill: 0    3. Hoarseness of voice    Blood pressures reviewed with pt, elevated likely due to illness and taking OTC cold medicine/Mucinex - instructed to change to Coricidin HBP   Cont to monitor BP, if elevated at appt in 2 weeks will increase Lisinopril  No resp distress noted  Start Zpak   Hoarseness should resolve      Return for Next scheduled follow up.        MOON Wu

## 2024-02-13 ENCOUNTER — LAB (OUTPATIENT)
Dept: INTERNAL MEDICINE | Facility: CLINIC | Age: 64
End: 2024-02-13
Payer: COMMERCIAL

## 2024-02-13 ENCOUNTER — OFFICE VISIT (OUTPATIENT)
Dept: INTERNAL MEDICINE | Facility: CLINIC | Age: 64
End: 2024-02-13
Payer: COMMERCIAL

## 2024-02-13 VITALS
DIASTOLIC BLOOD PRESSURE: 78 MMHG | TEMPERATURE: 98 F | BODY MASS INDEX: 29.5 KG/M2 | SYSTOLIC BLOOD PRESSURE: 122 MMHG | WEIGHT: 217.8 LBS | HEART RATE: 79 BPM | OXYGEN SATURATION: 99 % | HEIGHT: 72 IN

## 2024-02-13 DIAGNOSIS — K40.90 RIGHT GROIN HERNIA: ICD-10-CM

## 2024-02-13 DIAGNOSIS — I83.92 VARICOSE VEINS OF LEFT LOWER LEG: ICD-10-CM

## 2024-02-13 DIAGNOSIS — I10 BENIGN ESSENTIAL HTN: Primary | ICD-10-CM

## 2024-02-13 DIAGNOSIS — I10 BENIGN ESSENTIAL HTN: ICD-10-CM

## 2024-02-13 DIAGNOSIS — Z00.00 ROUTINE PHYSICAL EXAMINATION: Primary | ICD-10-CM

## 2024-02-13 LAB
CHOLEST SERPL-MCNC: 157 MG/DL (ref 0–200)
HDLC SERPL-MCNC: 52 MG/DL (ref 40–60)
LDLC SERPL CALC-MCNC: 86 MG/DL (ref 0–100)
LDLC/HDLC SERPL: 1.61 {RATIO}
TRIGL SERPL-MCNC: 107 MG/DL (ref 0–150)
VLDLC SERPL-MCNC: 19 MG/DL (ref 5–40)

## 2024-02-13 PROCEDURE — 80061 LIPID PANEL: CPT | Performed by: NURSE PRACTITIONER

## 2024-02-13 PROCEDURE — 36415 COLL VENOUS BLD VENIPUNCTURE: CPT | Performed by: NURSE PRACTITIONER

## 2024-02-13 PROCEDURE — 99397 PER PM REEVAL EST PAT 65+ YR: CPT | Performed by: NURSE PRACTITIONER

## 2024-02-13 RX ORDER — LISINOPRIL 10 MG/1
10 TABLET ORAL 2 TIMES DAILY
Qty: 60 TABLET | Refills: 3 | Status: SHIPPED | OUTPATIENT
Start: 2024-02-13 | End: 2024-02-14

## 2024-02-14 DIAGNOSIS — I10 BENIGN ESSENTIAL HTN: ICD-10-CM

## 2024-02-14 RX ORDER — LISINOPRIL 10 MG/1
10 TABLET ORAL DAILY
Qty: 90 TABLET | Refills: 0 | Status: SHIPPED | OUTPATIENT
Start: 2024-02-14

## 2024-02-26 DIAGNOSIS — E03.9 ACQUIRED HYPOTHYROIDISM: ICD-10-CM

## 2024-02-26 RX ORDER — LEVOTHYROXINE SODIUM 0.2 MG/1
TABLET ORAL
Qty: 90 TABLET | Refills: 0 | Status: SHIPPED | OUTPATIENT
Start: 2024-02-26

## 2024-03-12 ENCOUNTER — HOSPITAL ENCOUNTER (OUTPATIENT)
Dept: ULTRASOUND IMAGING | Facility: HOSPITAL | Age: 64
Discharge: HOME | End: 2024-03-12
Admitting: NURSE PRACTITIONER
Payer: COMMERCIAL

## 2024-03-12 DIAGNOSIS — K40.90 RIGHT GROIN HERNIA: ICD-10-CM

## 2024-03-12 PROCEDURE — 76857 US EXAM PELVIC LIMITED: CPT

## 2024-06-18 DIAGNOSIS — E03.9 ACQUIRED HYPOTHYROIDISM: ICD-10-CM

## 2024-06-18 RX ORDER — LEVOTHYROXINE SODIUM 0.2 MG/1
TABLET ORAL
Qty: 90 TABLET | Refills: 1 | Status: SHIPPED | OUTPATIENT
Start: 2024-06-18

## 2024-06-20 ENCOUNTER — TELEPHONE (OUTPATIENT)
Dept: INTERNAL MEDICINE | Facility: CLINIC | Age: 64
End: 2024-06-20

## 2024-06-20 DIAGNOSIS — I10 BENIGN ESSENTIAL HTN: ICD-10-CM

## 2024-06-20 RX ORDER — LISINOPRIL 10 MG/1
10 TABLET ORAL 2 TIMES DAILY
Qty: 180 TABLET | Refills: 0 | Status: SHIPPED | OUTPATIENT
Start: 2024-06-20

## 2024-06-20 NOTE — TELEPHONE ENCOUNTER
Caller: Spenser Pascal    Relationship: Self    Best call back number: [phone]    What is the medical concern/diagnosis:     What specialty or service is being requested: RHEUMATOLOGY    What is the provider, practice or medical service name: [facility]    What is the office location:     What is the office phone number:     Any additional details: PT SEES [facility] RHEUMATOLOGY AND THEY ARE CLOSING. HE HAS SPOKE TO [facility] AND THEY SAID THEY CAN SEE HIM WHEN WE GET A REFERRAL TO THEM.

## 2024-06-21 DIAGNOSIS — M05.79 RHEUMATOID ARTHRITIS INVOLVING MULTIPLE SITES WITH POSITIVE RHEUMATOID FACTOR: Primary | ICD-10-CM

## 2024-08-13 ENCOUNTER — LAB (OUTPATIENT)
Dept: INTERNAL MEDICINE | Facility: CLINIC | Age: 64
End: 2024-08-13
Payer: COMMERCIAL

## 2024-08-13 ENCOUNTER — OFFICE VISIT (OUTPATIENT)
Dept: INTERNAL MEDICINE | Facility: CLINIC | Age: 64
End: 2024-08-13
Payer: COMMERCIAL

## 2024-08-13 VITALS
OXYGEN SATURATION: 100 % | DIASTOLIC BLOOD PRESSURE: 72 MMHG | HEIGHT: 72 IN | TEMPERATURE: 97.5 F | SYSTOLIC BLOOD PRESSURE: 122 MMHG | WEIGHT: 216 LBS | HEART RATE: 68 BPM | BODY MASS INDEX: 29.26 KG/M2

## 2024-08-13 DIAGNOSIS — Z12.5 SCREENING FOR PROSTATE CANCER: ICD-10-CM

## 2024-08-13 DIAGNOSIS — I10 BENIGN ESSENTIAL HTN: Primary | ICD-10-CM

## 2024-08-13 DIAGNOSIS — R73.03 PREDIABETES: ICD-10-CM

## 2024-08-13 DIAGNOSIS — I83.92 VARICOSE VEINS OF LEFT LOWER LEG: ICD-10-CM

## 2024-08-13 DIAGNOSIS — M05.79 RHEUMATOID ARTHRITIS INVOLVING MULTIPLE SITES WITH POSITIVE RHEUMATOID FACTOR: ICD-10-CM

## 2024-08-13 DIAGNOSIS — E03.9 ACQUIRED HYPOTHYROIDISM: ICD-10-CM

## 2024-08-13 LAB
ALBUMIN SERPL-MCNC: 4.4 G/DL (ref 3.5–5.2)
ALBUMIN/GLOB SERPL: 1.8 G/DL
ALP SERPL-CCNC: 76 U/L (ref 39–117)
ALT SERPL W P-5'-P-CCNC: 23 U/L (ref 1–41)
ANION GAP SERPL CALCULATED.3IONS-SCNC: 10 MMOL/L (ref 5–15)
AST SERPL-CCNC: 20 U/L (ref 1–40)
BASOPHILS # BLD AUTO: 0.06 10*3/MM3 (ref 0–0.2)
BASOPHILS NFR BLD AUTO: 0.8 % (ref 0–1.5)
BILIRUB SERPL-MCNC: 1.1 MG/DL (ref 0–1.2)
BUN SERPL-MCNC: 22 MG/DL (ref 8–23)
BUN/CREAT SERPL: 23.4 (ref 7–25)
CALCIUM SPEC-SCNC: 9.5 MG/DL (ref 8.6–10.5)
CHLORIDE SERPL-SCNC: 106 MMOL/L (ref 98–107)
CO2 SERPL-SCNC: 26 MMOL/L (ref 22–29)
CREAT SERPL-MCNC: 0.94 MG/DL (ref 0.76–1.27)
DEPRECATED RDW RBC AUTO: 45.7 FL (ref 37–54)
EGFRCR SERPLBLD CKD-EPI 2021: 89.4 ML/MIN/1.73
EOSINOPHIL # BLD AUTO: 0.1 10*3/MM3 (ref 0–0.4)
EOSINOPHIL NFR BLD AUTO: 1.3 % (ref 0.3–6.2)
ERYTHROCYTE [DISTWIDTH] IN BLOOD BY AUTOMATED COUNT: 14 % (ref 12.3–15.4)
GLOBULIN UR ELPH-MCNC: 2.4 GM/DL
GLUCOSE SERPL-MCNC: 99 MG/DL (ref 65–99)
HBA1C MFR BLD: 5.7 % (ref 4.8–5.6)
HCT VFR BLD AUTO: 45.6 % (ref 37.5–51)
HGB BLD-MCNC: 15.6 G/DL (ref 13–17.7)
IMM GRANULOCYTES # BLD AUTO: 0.05 10*3/MM3 (ref 0–0.05)
IMM GRANULOCYTES NFR BLD AUTO: 0.7 % (ref 0–0.5)
LYMPHOCYTES # BLD AUTO: 0.75 10*3/MM3 (ref 0.7–3.1)
LYMPHOCYTES NFR BLD AUTO: 10.1 % (ref 19.6–45.3)
MCH RBC QN AUTO: 31.4 PG (ref 26.6–33)
MCHC RBC AUTO-ENTMCNC: 34.2 G/DL (ref 31.5–35.7)
MCV RBC AUTO: 91.8 FL (ref 79–97)
MONOCYTES # BLD AUTO: 0.68 10*3/MM3 (ref 0.1–0.9)
MONOCYTES NFR BLD AUTO: 9.2 % (ref 5–12)
NEUTROPHILS NFR BLD AUTO: 5.79 10*3/MM3 (ref 1.7–7)
NEUTROPHILS NFR BLD AUTO: 77.9 % (ref 42.7–76)
NRBC BLD AUTO-RTO: 0 /100 WBC (ref 0–0.2)
PLATELET # BLD AUTO: 277 10*3/MM3 (ref 140–450)
PMV BLD AUTO: 9.9 FL (ref 6–12)
POTASSIUM SERPL-SCNC: 4.6 MMOL/L (ref 3.5–5.2)
PROT SERPL-MCNC: 6.8 G/DL (ref 6–8.5)
PSA SERPL-MCNC: 1.79 NG/ML (ref 0–4)
RBC # BLD AUTO: 4.97 10*6/MM3 (ref 4.14–5.8)
SODIUM SERPL-SCNC: 142 MMOL/L (ref 136–145)
T4 FREE SERPL-MCNC: 2.01 NG/DL (ref 0.92–1.68)
TSH SERPL DL<=0.05 MIU/L-ACNC: 0.08 UIU/ML (ref 0.27–4.2)
WBC NRBC COR # BLD AUTO: 7.43 10*3/MM3 (ref 3.4–10.8)

## 2024-08-13 PROCEDURE — 36415 COLL VENOUS BLD VENIPUNCTURE: CPT | Performed by: NURSE PRACTITIONER

## 2024-08-13 PROCEDURE — 83036 HEMOGLOBIN GLYCOSYLATED A1C: CPT | Performed by: NURSE PRACTITIONER

## 2024-08-13 PROCEDURE — 80050 GENERAL HEALTH PANEL: CPT | Performed by: NURSE PRACTITIONER

## 2024-08-13 PROCEDURE — G0103 PSA SCREENING: HCPCS | Performed by: NURSE PRACTITIONER

## 2024-08-13 PROCEDURE — 84439 ASSAY OF FREE THYROXINE: CPT | Performed by: NURSE PRACTITIONER

## 2024-08-13 PROCEDURE — 99214 OFFICE O/P EST MOD 30 MIN: CPT | Performed by: NURSE PRACTITIONER

## 2024-08-13 NOTE — PROGRESS NOTES
"Chief Complaint   Patient presents with    Follow-up    Hypertension       HPI  Spenser Pascal is a 64 y.o. male presents for follow-up on HTN, Vit D deficiency, and hypothyroidism.  He states he's doing well.  He seen vascular surgeon this summer and was instructed to wear compression stockings which he has been wearing everyday.  He states his rheumatology practice is closing.  He has already scheduled with new rheumatologist in Oct.  He is on Methotrexate which works fairly well.  He is having a lot of \"stiffness and soreness\".      The following portions of the patient's history were reviewed and updated as appropriate: allergies, current medications, past family history, past medical history, past social history, past surgical history, and problem list.    Subjective  Review of Systems   Constitutional:  Negative for activity change, appetite change and fatigue.   HENT:  Negative for congestion.    Respiratory:  Negative for cough and shortness of breath.    Cardiovascular:  Negative for chest pain and leg swelling.   Gastrointestinal:  Negative for abdominal pain.   Neurological:  Negative for dizziness, weakness and confusion.   Psychiatric/Behavioral:  Negative for behavioral problems and decreased concentration.        Objective  Visit Vitals  /72 (BP Location: Left arm, Patient Position: Sitting)   Pulse 68   Temp 97.5 °F (36.4 °C)   Ht 182.9 cm (72\")   Wt 98 kg (216 lb)   SpO2 100%   BMI 29.29 kg/m²        Physical Exam  Vitals and nursing note reviewed.   HENT:      Head: Normocephalic.   Eyes:      Pupils: Pupils are equal, round, and reactive to light.   Cardiovascular:      Rate and Rhythm: Normal rate and regular rhythm.      Pulses: Normal pulses.      Heart sounds: Normal heart sounds.   Pulmonary:      Effort: Pulmonary effort is normal. No respiratory distress.      Breath sounds: Normal breath sounds.   Skin:     General: Skin is warm and dry.      Capillary Refill: Capillary refill takes " less than 2 seconds.   Neurological:      General: No focal deficit present.      Mental Status: He is alert and oriented to person, place, and time.      Gait: Gait is intact.   Psychiatric:         Attention and Perception: Attention normal.         Mood and Affect: Mood normal.         Behavior: Behavior normal.          Procedures     Assessment and Plan  Diagnoses and all orders for this visit:    1. Benign essential HTN (Primary)  -     CBC & Differential  -     Comprehensive Metabolic Panel    2. Acquired hypothyroidism  -     TSH Rfx On Abnormal To Free T4    3. Rheumatoid arthritis involving multiple sites with positive rheumatoid factor    4. Varicose veins of left lower leg    5. Prediabetes  -     Hemoglobin A1c    6. Screening for prostate cancer  -     PSA Screen    Labs today  HTN stable on Lisinopril  Cont with compression stockings  Keep appt with rheum in Oct      Return in about 6 months (around 2/13/2025) for Annual physical.         MOON Wu

## 2024-08-19 DIAGNOSIS — E03.9 ACQUIRED HYPOTHYROIDISM: Primary | ICD-10-CM

## 2024-08-19 RX ORDER — LEVOTHYROXINE SODIUM 175 UG/1
175 TABLET ORAL DAILY
Qty: 30 TABLET | Refills: 3 | Status: SHIPPED | OUTPATIENT
Start: 2024-08-19

## 2024-08-20 ENCOUNTER — TELEPHONE (OUTPATIENT)
Dept: INTERNAL MEDICINE | Facility: CLINIC | Age: 64
End: 2024-08-20

## 2024-08-21 DIAGNOSIS — R79.89 ABNORMAL CBC: Primary | ICD-10-CM

## 2024-10-08 ENCOUNTER — LAB (OUTPATIENT)
Dept: INTERNAL MEDICINE | Facility: CLINIC | Age: 64
End: 2024-10-08
Payer: COMMERCIAL

## 2024-10-08 DIAGNOSIS — E03.9 ACQUIRED HYPOTHYROIDISM: ICD-10-CM

## 2024-10-08 DIAGNOSIS — R79.89 ABNORMAL CBC: ICD-10-CM

## 2024-10-08 PROCEDURE — 85025 COMPLETE CBC W/AUTO DIFF WBC: CPT | Performed by: NURSE PRACTITIONER

## 2024-10-08 PROCEDURE — 36415 COLL VENOUS BLD VENIPUNCTURE: CPT | Performed by: NURSE PRACTITIONER

## 2024-10-08 PROCEDURE — 84443 ASSAY THYROID STIM HORMONE: CPT | Performed by: NURSE PRACTITIONER

## 2024-10-09 LAB
BASOPHILS # BLD AUTO: 0.05 10*3/MM3 (ref 0–0.2)
BASOPHILS NFR BLD AUTO: 0.8 % (ref 0–1.5)
DEPRECATED RDW RBC AUTO: 50.4 FL (ref 37–54)
EOSINOPHIL # BLD AUTO: 0.11 10*3/MM3 (ref 0–0.4)
EOSINOPHIL NFR BLD AUTO: 1.8 % (ref 0.3–6.2)
ERYTHROCYTE [DISTWIDTH] IN BLOOD BY AUTOMATED COUNT: 14.3 % (ref 12.3–15.4)
HCT VFR BLD AUTO: 44.5 % (ref 37.5–51)
HGB BLD-MCNC: 14.7 G/DL (ref 13–17.7)
IMM GRANULOCYTES # BLD AUTO: 0.02 10*3/MM3 (ref 0–0.05)
IMM GRANULOCYTES NFR BLD AUTO: 0.3 % (ref 0–0.5)
LYMPHOCYTES # BLD AUTO: 0.57 10*3/MM3 (ref 0.7–3.1)
LYMPHOCYTES NFR BLD AUTO: 9.2 % (ref 19.6–45.3)
MCH RBC QN AUTO: 32 PG (ref 26.6–33)
MCHC RBC AUTO-ENTMCNC: 33 G/DL (ref 31.5–35.7)
MCV RBC AUTO: 96.7 FL (ref 79–97)
MONOCYTES # BLD AUTO: 0.66 10*3/MM3 (ref 0.1–0.9)
MONOCYTES NFR BLD AUTO: 10.7 % (ref 5–12)
NEUTROPHILS NFR BLD AUTO: 4.78 10*3/MM3 (ref 1.7–7)
NEUTROPHILS NFR BLD AUTO: 77.2 % (ref 42.7–76)
NRBC BLD AUTO-RTO: 0 /100 WBC (ref 0–0.2)
PLATELET # BLD AUTO: 220 10*3/MM3 (ref 140–450)
PMV BLD AUTO: 10.2 FL (ref 6–12)
RBC # BLD AUTO: 4.6 10*6/MM3 (ref 4.14–5.8)
TSH SERPL DL<=0.05 MIU/L-ACNC: 0.62 UIU/ML (ref 0.27–4.2)
WBC NRBC COR # BLD AUTO: 6.19 10*3/MM3 (ref 3.4–10.8)

## 2024-10-18 ENCOUNTER — OFFICE VISIT (OUTPATIENT)
Age: 64
End: 2024-10-18
Payer: COMMERCIAL

## 2024-10-18 ENCOUNTER — LAB (OUTPATIENT)
Facility: HOSPITAL | Age: 64
End: 2024-10-18
Payer: COMMERCIAL

## 2024-10-18 VITALS
BODY MASS INDEX: 29.66 KG/M2 | HEIGHT: 72 IN | HEART RATE: 76 BPM | DIASTOLIC BLOOD PRESSURE: 78 MMHG | SYSTOLIC BLOOD PRESSURE: 132 MMHG | WEIGHT: 219 LBS | TEMPERATURE: 97.2 F

## 2024-10-18 DIAGNOSIS — R53.82 CHRONIC FATIGUE: ICD-10-CM

## 2024-10-18 DIAGNOSIS — Z79.899 HIGH RISK MEDICATION USE: ICD-10-CM

## 2024-10-18 DIAGNOSIS — D84.821 IMMUNOSUPPRESSION DUE TO DRUG THERAPY: ICD-10-CM

## 2024-10-18 DIAGNOSIS — M17.12 PRIMARY OSTEOARTHRITIS OF LEFT KNEE: ICD-10-CM

## 2024-10-18 DIAGNOSIS — M54.50 CHRONIC MIDLINE LOW BACK PAIN WITHOUT SCIATICA: ICD-10-CM

## 2024-10-18 DIAGNOSIS — Z96.651 STATUS POST RIGHT PARTIAL KNEE REPLACEMENT: ICD-10-CM

## 2024-10-18 DIAGNOSIS — M06.09 RHEUMATOID ARTHRITIS OF MULTIPLE SITES WITH NEGATIVE RHEUMATOID FACTOR: Primary | ICD-10-CM

## 2024-10-18 DIAGNOSIS — G89.29 CHRONIC MIDLINE LOW BACK PAIN WITHOUT SCIATICA: ICD-10-CM

## 2024-10-18 DIAGNOSIS — Z79.899 IMMUNOSUPPRESSION DUE TO DRUG THERAPY: ICD-10-CM

## 2024-10-18 DIAGNOSIS — M06.09 RHEUMATOID ARTHRITIS OF MULTIPLE SITES WITH NEGATIVE RHEUMATOID FACTOR: ICD-10-CM

## 2024-10-18 LAB
BASOPHILS # BLD AUTO: 0.09 10*3/MM3 (ref 0–0.2)
BASOPHILS NFR BLD AUTO: 1 % (ref 0–1.5)
DEPRECATED RDW RBC AUTO: 50.1 FL (ref 37–54)
EOSINOPHIL # BLD AUTO: 0.15 10*3/MM3 (ref 0–0.4)
EOSINOPHIL NFR BLD AUTO: 1.7 % (ref 0.3–6.2)
ERYTHROCYTE [DISTWIDTH] IN BLOOD BY AUTOMATED COUNT: 14.4 % (ref 12.3–15.4)
ERYTHROCYTE [SEDIMENTATION RATE] IN BLOOD: 3 MM/HR (ref 0–20)
HCT VFR BLD AUTO: 45.1 % (ref 37.5–51)
HGB BLD-MCNC: 15.4 G/DL (ref 13–17.7)
IMM GRANULOCYTES # BLD AUTO: 0.03 10*3/MM3 (ref 0–0.05)
IMM GRANULOCYTES NFR BLD AUTO: 0.3 % (ref 0–0.5)
LYMPHOCYTES # BLD AUTO: 0.85 10*3/MM3 (ref 0.7–3.1)
LYMPHOCYTES NFR BLD AUTO: 9.4 % (ref 19.6–45.3)
MCH RBC QN AUTO: 33.1 PG (ref 26.6–33)
MCHC RBC AUTO-ENTMCNC: 34.1 G/DL (ref 31.5–35.7)
MCV RBC AUTO: 97 FL (ref 79–97)
MONOCYTES # BLD AUTO: 1 10*3/MM3 (ref 0.1–0.9)
MONOCYTES NFR BLD AUTO: 11 % (ref 5–12)
NEUTROPHILS NFR BLD AUTO: 6.94 10*3/MM3 (ref 1.7–7)
NEUTROPHILS NFR BLD AUTO: 76.6 % (ref 42.7–76)
NRBC BLD AUTO-RTO: 0 /100 WBC (ref 0–0.2)
PLATELET # BLD AUTO: 296 10*3/MM3 (ref 140–450)
PMV BLD AUTO: 9.8 FL (ref 6–12)
RBC # BLD AUTO: 4.65 10*6/MM3 (ref 4.14–5.8)
WBC NRBC COR # BLD AUTO: 9.06 10*3/MM3 (ref 3.4–10.8)

## 2024-10-18 PROCEDURE — 80053 COMPREHEN METABOLIC PANEL: CPT

## 2024-10-18 PROCEDURE — 36415 COLL VENOUS BLD VENIPUNCTURE: CPT

## 2024-10-18 PROCEDURE — 83520 IMMUNOASSAY QUANT NOS NONAB: CPT

## 2024-10-18 PROCEDURE — 86038 ANTINUCLEAR ANTIBODIES: CPT

## 2024-10-18 PROCEDURE — 86140 C-REACTIVE PROTEIN: CPT

## 2024-10-18 PROCEDURE — 85652 RBC SED RATE AUTOMATED: CPT

## 2024-10-18 PROCEDURE — 86200 CCP ANTIBODY: CPT

## 2024-10-18 PROCEDURE — 86480 TB TEST CELL IMMUN MEASURE: CPT

## 2024-10-18 PROCEDURE — 86431 RHEUMATOID FACTOR QUANT: CPT

## 2024-10-18 PROCEDURE — 80074 ACUTE HEPATITIS PANEL: CPT

## 2024-10-18 PROCEDURE — 84550 ASSAY OF BLOOD/URIC ACID: CPT

## 2024-10-18 PROCEDURE — 85025 COMPLETE CBC W/AUTO DIFF WBC: CPT

## 2024-10-18 RX ORDER — SYRINGE, DISPOSABLE, 1 ML
SYRINGE, EMPTY DISPOSABLE MISCELLANEOUS
COMMUNITY
Start: 2024-08-09

## 2024-10-18 RX ORDER — FOLIC ACID 1 MG/1
2 TABLET ORAL DAILY
Qty: 180 TABLET | Refills: 1 | Status: SHIPPED | OUTPATIENT
Start: 2024-10-18

## 2024-10-18 RX ORDER — PREDNISONE 5 MG/1
1 TABLET ORAL DAILY PRN
COMMUNITY

## 2024-10-18 RX ORDER — PREDNISOLONE ACETATE 10 MG/ML
SUSPENSION/ DROPS OPHTHALMIC
COMMUNITY

## 2024-10-18 RX ORDER — TAMSULOSIN HYDROCHLORIDE 0.4 MG/1
CAPSULE ORAL
COMMUNITY

## 2024-10-18 RX ORDER — ONDANSETRON 4 MG/1
TABLET, FILM COATED ORAL
COMMUNITY

## 2024-10-18 RX ORDER — FLUOROURACIL 50 MG/G
CREAM TOPICAL
COMMUNITY
Start: 2023-11-27

## 2024-10-18 RX ORDER — SODIUM, POTASSIUM,MAG SULFATES 17.5-3.13G
SOLUTION, RECONSTITUTED, ORAL ORAL
COMMUNITY

## 2024-10-18 RX ORDER — NEEDLES, DISPOSABLE 25GX5/8"
NEEDLE, DISPOSABLE MISCELLANEOUS
COMMUNITY
Start: 2024-07-29

## 2024-10-18 RX ORDER — METHOTREXATE 25 MG/ML
20 INJECTION INTRA-ARTERIAL; INTRAMUSCULAR; INTRATHECAL; INTRAVENOUS WEEKLY
Qty: 3.2 ML | Refills: 2 | Status: SHIPPED | OUTPATIENT
Start: 2024-10-18

## 2024-10-18 RX ORDER — KETOROLAC TROMETHAMINE 10 MG/1
TABLET, FILM COATED ORAL
COMMUNITY
End: 2024-10-18

## 2024-10-18 RX ORDER — ABATACEPT 125 MG/ML
125 INJECTION, SOLUTION SUBCUTANEOUS WEEKLY
Qty: 4 ML | Refills: 3 | Status: SHIPPED | OUTPATIENT
Start: 2024-10-18

## 2024-10-18 RX ORDER — OXYCODONE AND ACETAMINOPHEN 7.5; 325 MG/1; MG/1
TABLET ORAL
COMMUNITY

## 2024-10-18 RX ORDER — OXYCODONE AND ACETAMINOPHEN 5; 325 MG/1; MG/1
1 TABLET ORAL EVERY 8 HOURS PRN
COMMUNITY

## 2024-10-18 NOTE — PROGRESS NOTES
"                                        Office Visit       Date: 10/18/2024   Patient Name: Spenser Pascal  MRN: [id number]  YOB: 1960    Referring Physician: PCP    Chief Complaint: Rheumatoid arthritis      History of Present Illness: Spenser Pascal is a 64 y.o. male who is here today in consultation for rheumatoid arthritis.  He was diagnosed with rheumatoid arthritis in [place] in 2014.  Apparently, his rheumatoid arthritis was seronegative.  He had involvement of the hands primarily but had diffuse aching.  He had swelling in both hands.  He was initially started on methotrexate and steroids.  He did not have complete control of his disease with methotrexate and Humira was added.  He did very well with Humira until 2016 when it became less effective.  At that time, he was switched to Orencia along with methotrexate.    He has done fairly well on Orencia.  He still says he has \"flares\" about 2 times a month.  These flares do not consist of joint swelling.  He has diffuse aches and fatigue.  The symptoms are responsive to a couple of days of prednisone.  He had previously been managed at [facility] of rheumatology.    He states he has had no swelling since the onset of his rheumatoid arthritis.  He has morning stiffness lasting about 1 hour.  He improves with activity.  He has no history of night pain.  His activities of daily living remain good.    He has tolerated the medications well.  He has been getting labs every 3 months.    He has no history of psoriasis, iritis, or inflammatory bowel disease.  He has had no ocular or pulmonary involvement.  He has had no symptoms suggestive of vasculitis.    He tolerates methotrexate well.  He takes this by injection.  He tolerates the Orencia well and has not had injection site reactions or infections.      Subjective   Review of Systems: Review of Systems   Constitutional:  Negative for chills, fatigue, fever and unexpected weight loss.   HENT:  Positive for " tinnitus. Negative for dental problem, mouth sores, nosebleeds, sinus pressure, swollen glands and trouble swallowing.    Eyes:  Negative for blurred vision, double vision, photophobia, pain, redness and visual disturbance.   Respiratory:  Negative for apnea, cough, choking, chest tightness and shortness of breath.    Cardiovascular:  Negative for chest pain, palpitations and leg swelling.   Gastrointestinal:  Negative for abdominal pain, blood in stool, constipation, diarrhea, nausea, vomiting and GERD.   Endocrine: Negative for cold intolerance, heat intolerance, polydipsia, polyphagia and polyuria.   Genitourinary:  Negative for difficulty urinating, dysuria, genital sores, hematuria and urinary incontinence.   Musculoskeletal:  Positive for arthralgias, myalgias and neck stiffness. Negative for back pain, gait problem, joint swelling, neck pain and bursitis.        Morning stiffness     Skin:  Negative for dry skin, rash, skin lesions and bruise.   Allergic/Immunologic: Positive for environmental allergies. Negative for food allergies.   Neurological:  Negative for dizziness, tremors, seizures, syncope, weakness, numbness, headache, memory problem and confusion.   Hematological:  Negative for adenopathy. Does not bruise/bleed easily.   Psychiatric/Behavioral:  Negative for sleep disturbance, suicidal ideas, depressed mood and stress. The patient is not nervous/anxious.       Past Medical History:   Past Medical History:   Diagnosis Date    Arthritis     Broken bones     thumb and toe    Cancer 2020    Squamous cell carcinoma    Colon polyp 1985    No others since    Diverticulosis 2000?    HL (hearing loss) 1995?    Hypertension ?    Hypothyroidism 2000?    Otosclerosis     Rheumatoid arthritis        Past Surgical History:   Past Surgical History:   Procedure Laterality Date    COLONOSCOPY  Oct 2023    FRACTURE SURGERY  2002    JOINT REPLACEMENT  2020    LAMINECTOMY      LIPOMA EXCISION      REPLACEMENT TOTAL  "KNEE      SALIVARY GLAND SURGERY      SPINE SURGERY  2023    Also in 1980 and 2011       Family History:   Family History   Problem Relation Age of Onset    Breast cancer Mother     Cancer Mother         Breast    Hypothyroidism Father     Parkinsonism Father     Hypertension Father     Thyroid disease Father     Other (gall bladder removal) Father     Hearing loss Sister     Hearing loss Brother     Cancer Maternal Grandmother         Breast    Heart disease Maternal Grandmother     Heart disease Maternal Grandfather     Arthritis Paternal Grandmother     Hearing loss Paternal Grandmother     Hypertension Paternal Grandmother     Cancer Paternal Grandfather         Liver       Social History:   Social History     Socioeconomic History    Marital status:    Tobacco Use    Smoking status: Never    Smokeless tobacco: Never   Vaping Use    Vaping status: Never Used   Substance and Sexual Activity    Alcohol use: Yes     Alcohol/week: 6.0 standard drinks of alcohol     Types: 3 Glasses of wine, 3 Cans of beer per week    Drug use: No    Sexual activity: Not Currently     Partners: Female       Medications:   Current Outpatient Medications:     BD Syringe Slip Tip 25G X 5/8\" 1 ML misc, USE WITH METHOTREXATE INJECTION ONCE WEEKLY, Disp: , Rfl:     folic acid (FOLVITE) 1 MG tablet, Take 2 tablets by mouth Daily., Disp: , Rfl:     levothyroxine (SYNTHROID, LEVOTHROID) 175 MCG tablet, Take 1 tablet by mouth Daily., Disp: 30 tablet, Rfl: 3    lisinopril (PRINIVIL,ZESTRIL) 10 MG tablet, Take 1 tablet by mouth twice daily, Disp: 180 tablet, Rfl: 0    Methotrexate Sodium (METHOTREXATE PF) 50 MG/2ML chemo syringe, INJECT 0.8ML (20MG) ONCE A WEEK (DISCARD VIAL AFTER EACH INJECTION), Disp: , Rfl: 1    Orencia ClickJect 125 MG/ML solution auto-injector, , Disp: , Rfl:     predniSONE (DELTASONE) 5 MG tablet, Take 1 tablet by mouth Daily As Needed., Disp: , Rfl:     vitamin D (ERGOCALCIFEROL) 1.25 MG (22836 UT) capsule capsule, " "Take 1 capsule by mouth 1 (One) Time Per Week., Disp: , Rfl:     BD Disp Needles 25G X 5/8\" misc, USE WITH METHOTREXATE INJECTION WEEKLY (Patient not taking: Reported on 10/18/2024), Disp: , Rfl:     fluorouracil (EFUDEX) 5 % cream, Apply to the face BID x 5 DAYS. (Patient not taking: Reported on 10/18/2024), Disp: , Rfl:     ketorolac (TORADOL) 10 MG tablet, , Disp: , Rfl:     ondansetron (ZOFRAN) 4 MG tablet, , Disp: , Rfl:     oxyCODONE-acetaminophen (PERCOCET) 5-325 MG per tablet, Take 1 tablet by mouth Every 8 (Eight) Hours As Needed. (Patient not taking: Reported on 10/18/2024), Disp: , Rfl:     oxyCODONE-acetaminophen (PERCOCET) 7.5-325 MG per tablet, , Disp: , Rfl:     prednisoLONE acetate (PRED FORTE) 1 % ophthalmic suspension, INSTILL 1 DROP INTO EACH EYE 3 TIMES A DAY (Patient not taking: Reported on 10/18/2024), Disp: , Rfl:     Sod Picosulfate-Mag Ox-Cit Acd 10-3.5-12 MG-GM-GM pack, , Disp: , Rfl:     sodium-potassium-magnesium sulfates (SUPREP) 17.5-3.13-1.6 GM/177ML solution oral solution, TAKE 1 BOTTLE AS DIRECTED. FOLLOW INSTRUCTIONS THAT WERE MAILED TO YOUR HOME. IF YOU DIDN T RECEIVE THESE, CALL [phone] (Patient not taking: Reported on 10/18/2024), Disp: , Rfl:     tamsulosin (FLOMAX) 0.4 MG capsule 24 hr capsule, , Disp: , Rfl:     tiZANidine (ZANAFLEX) 4 MG tablet, tiZANidine HCl 4 MG Oral Tablet QTY: 60 tablet Days: 30 Refills: 0  Written: 11/17/22 Patient Instructions: (Patient not taking: Reported on 10/18/2024), Disp: , Rfl:     Allergies: No Known Allergies    I have reviewed and updated the patient's chief complaint, history of present illness, review of systems, past medical history, surgical history, family history, social history, medications and allergy list as appropriate.     Objective    Vital Signs:   Vitals:    10/18/24 0917   Weight: 99.3 kg (219 lb)   Height: 182.9 cm (72\")   PainSc:   2   PainLoc: Generalized     Body mass index is 29.7 kg/m².     Physical " Exam:  General: The patient is well-developed and well nourished. Cooperative, alert and oriented x3. Affect is normal. Hydration appears normal.   HEENT: Normocephalic and atraumatic. No notable alopecia. Lids and conjunctiva are normal. Pupils are equal and sclera are clear. Oropharynx is clear   NECK: Supple without adenopathy, masses or thyromegaly.   CARDIOVASCULAR: Regular rate and rhythm. No murmurs, rubs or gallops   LUNGS: Effort is normal. Lungs are clear bilaterally.  ABDOMEN: Soft and non-tender without masses or hepatosplenomegaly..   EXTREMITIES: No edema.  No cyanosis or clubbing.  SKIN: Inspection and palpation are normal.  NEUROLOGIC: Gait is normal.  Deep tendon reflexes are 2+ and symmetric.  MUSCULOSKELETAL: Complete joint exam was performed. There was full range of motion of the shoulders, elbows, wrists and hands without soft tissue swelling synovitis or deformities except as noted.  There are minor degenerative changes in the hands.  There is no active synovitis.  Hips have good flexion and internal and external rotation.  Knees have no palpable effusions.  There is a well-healed scar on the right anterior knee.  There is full extension and flexion.  Left knee has bony prominence and crepitus with motion.  Ankles and feet have no soft tissue swelling or synovitis.  BACK:  Straight without scoliosis  Joint Exam 10/18/2024     The following joints were examined and normal:   Left TMJ, Right TMJ, Left Sternoclavicular, Right Sternoclavicular, Left Acromioclavicular, Right Acromioclavicular, Left Glenohumeral, Right Glenohumeral, Left Elbow, Right Elbow, Left Wrist, Right Wrist, Left MCP 1, Right MCP 1, Left MCP 2, Right MCP 2, Left MCP 3, Right MCP 3, Left MCP 4, Right MCP 4, Left MCP 5, Right MCP 5, Left IP (thumb), Right IP (thumb), Left PIP 2 (finger), Right PIP 2 (finger), Left PIP 3 (finger), Right PIP 3 (finger), Left PIP 4 (finger), Right PIP 4 (finger), Left PIP 5 (finger), Right PIP 5  "(finger), Left DIP 2 (finger), Right DIP 2 (finger), Left DIP 3 (finger), Right DIP 3 (finger), Left DIP 4 (finger), Right DIP 4 (finger), Left DIP 5 (finger), Right DIP 5 (finger), Left Hip, Right Hip, Left Knee, Right Knee, Left Ankle, Right Ankle, Left Tarsometatarsal, Right Tarsometatarsal, Left MTP 1, Right MTP 1, Left MTP 2, Right MTP 2, Left MTP 3, Right MTP 3, Left MTP 4, Right MTP 4, Left MTP 5, Right MTP 5, Left IP (toe), Right IP (toe), Left PIP 2 (toe), Right PIP 2 (toe), Left PIP 3 (toe), Right PIP 3 (toe), Left PIP 4 (toe), Right PIP 4 (toe), Left PIP 5 (toe), Right PIP 5 (toe)       Patient Global: --  Provider Global: --      Assessment / Plan        Assessment & Plan  Rheumatoid arthritis of multiple sites with negative rheumatoid factor  Onset 2014.  Apparently seronegative.  Diagnosed by rheumatologist in [place].  Initial treatment methotrexate and steroids.  Methotrexate monotherapy ineffective.  Humira added until it became ineffective 2016.  Orencia started 2016.  Currently on Orencia weekly injection and methotrexate weekly injection.    He has what he terms \"flares\" with occasional aches and pains and fatigue.  He denies joint swelling.  He currently shows no evidence of synovitis and no significant deformities.  I will restage his disease today.  Because he is on immunosuppressive therapy, I will obtain Q TB, hepatitis panel, and chest x-ray.  We will constance-ray his hands today.  I will see him in follow-up in 3 months.  Lab orders given and refills given.  High risk medication use  Methotrexate.  I discussed getting every 6 to 8-week labs.  Immunosuppression due to drug therapy  Orencia.  No history of opportunistic infections.  No frequent infections.  No injection site reactions.  Chronic midline low back pain without sciatica  He has had multiple lumbar surgeries.  Currently he is doing well.  Chronic fatigue  Long-term problem  Status post right partial knee replacement  This was done by Dr." Damaris.  He had good results.  Primary osteoarthritis of left knee  Symptoms are currently fairly low-grade.             Follow Up:   No follow-ups on file.        Chava English MD  [facility]

## 2024-10-18 NOTE — ASSESSMENT & PLAN NOTE
Orencia.  No history of opportunistic infections.  No frequent infections.  No injection site reactions.

## 2024-10-18 NOTE — ASSESSMENT & PLAN NOTE
"Onset 2014.  Apparently seronegative.  Diagnosed by rheumatologist in [place].  Initial treatment methotrexate and steroids.  Methotrexate monotherapy ineffective.  Humira added until it became ineffective 2016.  Orencia started 2016.  Currently on Orencia weekly injection and methotrexate weekly injection.    He has what he terms \"flares\" with occasional aches and pains and fatigue.  He denies joint swelling.  He currently shows no evidence of synovitis and no significant deformities.  I will restage his disease today.  Because he is on immunosuppressive therapy, I will obtain Q TB, hepatitis panel, and chest x-ray.  We will constance-ray his hands today.  I will see him in follow-up in 3 months.  Lab orders given and refills given.  "

## 2024-10-19 LAB
ALBUMIN SERPL-MCNC: 4.5 G/DL (ref 3.5–5.2)
ALBUMIN/GLOB SERPL: 2 G/DL
ALP SERPL-CCNC: 80 U/L (ref 39–117)
ALT SERPL W P-5'-P-CCNC: 47 U/L (ref 1–41)
ANION GAP SERPL CALCULATED.3IONS-SCNC: 9.2 MMOL/L (ref 5–15)
AST SERPL-CCNC: 36 U/L (ref 1–40)
BILIRUB SERPL-MCNC: 1.8 MG/DL (ref 0–1.2)
BUN SERPL-MCNC: 18 MG/DL (ref 8–23)
BUN/CREAT SERPL: 17.6 (ref 7–25)
CALCIUM SPEC-SCNC: 9.6 MG/DL (ref 8.6–10.5)
CHLORIDE SERPL-SCNC: 106 MMOL/L (ref 98–107)
CHROMATIN AB SERPL-ACNC: <10 IU/ML (ref 0–14)
CO2 SERPL-SCNC: 26.8 MMOL/L (ref 22–29)
CREAT SERPL-MCNC: 1.02 MG/DL (ref 0.76–1.27)
CRP SERPL-MCNC: 0.64 MG/DL (ref 0–0.5)
EGFRCR SERPLBLD CKD-EPI 2021: 81.1 ML/MIN/1.73
GLOBULIN UR ELPH-MCNC: 2.3 GM/DL
GLUCOSE SERPL-MCNC: 84 MG/DL (ref 65–99)
HAV IGM SERPL QL IA: NORMAL
HBV CORE IGM SERPL QL IA: NORMAL
HBV SURFACE AG SERPL QL IA: NORMAL
HCV AB SER QL: NORMAL
POTASSIUM SERPL-SCNC: 4.7 MMOL/L (ref 3.5–5.2)
PROT SERPL-MCNC: 6.8 G/DL (ref 6–8.5)
SODIUM SERPL-SCNC: 142 MMOL/L (ref 136–145)
URATE SERPL-MCNC: 8.9 MG/DL (ref 3.4–7)

## 2024-10-21 LAB — CCP IGA+IGG SERPL IA-ACNC: 19 UNITS (ref 0–19)

## 2024-10-22 LAB
ANA SER QL IF: NEGATIVE
GAMMA INTERFERON BACKGROUND BLD IA-ACNC: 0.04 IU/ML
LABORATORY COMMENT REPORT: NORMAL
M TB IFN-G BLD-IMP: NEGATIVE
M TB IFN-G CD4+ BCKGRND COR BLD-ACNC: 0.04 IU/ML
M TB IFN-G CD4+CD8+ BCKGRND COR BLD-ACNC: 0.05 IU/ML
MITOGEN IGNF BCKGRD COR BLD-ACNC: >10 IU/ML
QUANTIFERON INCUBATION: NORMAL
SERVICE CMNT-IMP: NORMAL

## 2024-10-27 LAB — 14-3-3 ETA AG SER IA-MCNC: <0.2 NG/ML

## 2024-10-28 ENCOUNTER — HOSPITAL ENCOUNTER (OUTPATIENT)
Facility: HOSPITAL | Age: 64
Discharge: HOME | End: 2024-10-28
Admitting: INTERNAL MEDICINE
Payer: COMMERCIAL

## 2024-10-28 ENCOUNTER — TELEPHONE (OUTPATIENT)
Dept: INTERNAL MEDICINE | Facility: CLINIC | Age: 64
End: 2024-10-28

## 2024-10-28 ENCOUNTER — LAB (OUTPATIENT)
Dept: INTERNAL MEDICINE | Facility: CLINIC | Age: 64
End: 2024-10-28
Payer: COMMERCIAL

## 2024-10-28 ENCOUNTER — OFFICE VISIT (OUTPATIENT)
Dept: INTERNAL MEDICINE | Facility: CLINIC | Age: 64
End: 2024-10-28
Payer: COMMERCIAL

## 2024-10-28 VITALS
HEIGHT: 72 IN | TEMPERATURE: 98 F | SYSTOLIC BLOOD PRESSURE: 140 MMHG | RESPIRATION RATE: 24 BRPM | WEIGHT: 218 LBS | DIASTOLIC BLOOD PRESSURE: 84 MMHG | OXYGEN SATURATION: 96 % | BODY MASS INDEX: 29.53 KG/M2 | HEART RATE: 104 BPM

## 2024-10-28 DIAGNOSIS — J81.0 ACUTE PULMONARY EDEMA: Primary | ICD-10-CM

## 2024-10-28 DIAGNOSIS — J20.9 ACUTE BRONCHITIS, UNSPECIFIED ORGANISM: Primary | ICD-10-CM

## 2024-10-28 DIAGNOSIS — J20.9 ACUTE BRONCHITIS, UNSPECIFIED ORGANISM: ICD-10-CM

## 2024-10-28 LAB
EXPIRATION DATE: NORMAL
FLUAV AG UPPER RESP QL IA.RAPID: NOT DETECTED
FLUBV AG UPPER RESP QL IA.RAPID: NOT DETECTED
INTERNAL CONTROL: NORMAL
Lab: NORMAL
NT-PROBNP SERPL-MCNC: 2002 PG/ML (ref 0–900)
SARS-COV-2 AG UPPER RESP QL IA.RAPID: NOT DETECTED

## 2024-10-28 PROCEDURE — 71046 X-RAY EXAM CHEST 2 VIEWS: CPT

## 2024-10-28 PROCEDURE — 87428 SARSCOV & INF VIR A&B AG IA: CPT | Performed by: INTERNAL MEDICINE

## 2024-10-28 PROCEDURE — 99214 OFFICE O/P EST MOD 30 MIN: CPT | Performed by: INTERNAL MEDICINE

## 2024-10-28 PROCEDURE — 83880 ASSAY OF NATRIURETIC PEPTIDE: CPT | Performed by: INTERNAL MEDICINE

## 2024-10-28 RX ORDER — IPRATROPIUM BROMIDE AND ALBUTEROL SULFATE 2.5; .5 MG/3ML; MG/3ML
3 SOLUTION RESPIRATORY (INHALATION) EVERY 4 HOURS PRN
Qty: 90 ML | Refills: 3 | Status: ON HOLD | OUTPATIENT
Start: 2024-10-28 | End: 2024-11-04

## 2024-10-28 RX ORDER — AZITHROMYCIN 500 MG/1
500 TABLET, FILM COATED ORAL DAILY
Qty: 5 TABLET | Refills: 0 | Status: SHIPPED | OUTPATIENT
Start: 2024-10-28 | End: 2024-10-28

## 2024-10-28 RX ORDER — DOXYCYCLINE 100 MG/1
100 CAPSULE ORAL 2 TIMES DAILY
Qty: 10 CAPSULE | Refills: 0 | Status: ON HOLD | OUTPATIENT
Start: 2024-10-28

## 2024-10-28 RX ORDER — PSEUDOEPHEDRINE HCL 120 MG/1
120 TABLET, FILM COATED, EXTENDED RELEASE ORAL EVERY 12 HOURS
Qty: 20 TABLET | Refills: 0 | Status: CANCELLED | OUTPATIENT
Start: 2024-10-28

## 2024-10-28 RX ORDER — PREDNISONE 5 MG/1
1 TABLET ORAL DAILY
Qty: 21 TABLET | Refills: 0 | Status: ON HOLD | OUTPATIENT
Start: 2024-10-28 | End: 2024-11-04

## 2024-10-28 RX ORDER — GUAIFENESIN AND DEXTROMETHORPHAN HYDROBROMIDE 600; 30 MG/1; MG/1
2 TABLET, EXTENDED RELEASE ORAL EVERY 12 HOURS SCHEDULED
Qty: 20 TABLET | Refills: 0 | Status: CANCELLED | OUTPATIENT
Start: 2024-10-28

## 2024-10-28 RX ORDER — BENZONATATE 100 MG/1
100 CAPSULE ORAL 3 TIMES DAILY PRN
Qty: 30 CAPSULE | Refills: 1 | Status: ON HOLD | OUTPATIENT
Start: 2024-10-28

## 2024-10-28 RX ORDER — GUAIFENESIN 600 MG/1
1200 TABLET, EXTENDED RELEASE ORAL 2 TIMES DAILY
Qty: 30 TABLET | Refills: 0 | Status: CANCELLED | OUTPATIENT
Start: 2024-10-28

## 2024-10-28 NOTE — PROGRESS NOTES
Follow Up Office Visit      Date: 10/28/2024   Patient Name: Spenser Pascal  : 1960   MRN: [id number]     Chief Complaint:    Chief Complaint   Patient presents with    Cough    Shortness of Breath    Wheezing       History of Present Illness: Spenser Pascal is a 64 y.o. male who is here today to follow up with cough, wheezing, chest congestion, soa.  Patient is immunosuppressed from medication for RA. Started 4 days ago.  No fever. No sick contacts. No chills.  Traveling last 3 weeks.  Taking robitussin. Started bringing some up after. Brownish green sputum.        Subjective      Past Medical History:   Diagnosis Date    Arthritis     Broken bones     thumb and toe    Cancer     Squamous cell carcinoma    Colon polyp     No others since    Diverticulosis ?    HL (hearing loss) ?    Hypertension ?    Hypothyroidism ?    Otosclerosis     Rheumatoid arthritis        Past Surgical History:   Procedure Laterality Date    COLONOSCOPY  Oct 2023    FRACTURE SURGERY      JOINT REPLACEMENT      LAMINECTOMY      LIPOMA EXCISION      REPLACEMENT TOTAL KNEE      SALIVARY GLAND SURGERY      SPINE SURGERY      Also in  and        Family History   Problem Relation Age of Onset    Breast cancer Mother     Cancer Mother         Breast    Hypothyroidism Father     Parkinsonism Father     Hypertension Father     Thyroid disease Father     Other (gall bladder removal) Father     Hearing loss Sister     Hearing loss Brother     Cancer Maternal Grandmother         Breast    Heart disease Maternal Grandmother     Heart disease Maternal Grandfather     Arthritis Paternal Grandmother     Hearing loss Paternal Grandmother     Hypertension Paternal Grandmother     Cancer Paternal Grandfather         Liver        Social History     Socioeconomic History    Marital status:    Tobacco Use    Smoking status: Never    Smokeless tobacco: Never   Vaping Use    Vaping status: Never Used  "  Substance and Sexual Activity    Alcohol use: Yes     Alcohol/week: 6.0 standard drinks of alcohol     Types: 3 Glasses of wine, 3 Cans of beer per week    Drug use: No    Sexual activity: Not Currently     Partners: Female         I have reviewed the patients family history, social history, past medical history, past surgical history and have updated it as appropriate.     Medications:     Current Outpatient Medications:     folic acid (FOLVITE) 1 MG tablet, Take 2 tablets by mouth Daily., Disp: 180 tablet, Rfl: 1    levothyroxine (SYNTHROID, LEVOTHROID) 175 MCG tablet, Take 1 tablet by mouth Daily., Disp: 30 tablet, Rfl: 3    lisinopril (PRINIVIL,ZESTRIL) 10 MG tablet, Take 1 tablet by mouth twice daily, Disp: 180 tablet, Rfl: 0    Methotrexate Sodium (methotrexate PF) 50 MG/2ML chemo syringe, Inject 0.8 mL under the skin into the appropriate area as directed 1 (One) Time Per Week., Disp: 3.2 mL, Rfl: 2    Orencia ClickJect 125 MG/ML solution auto-injector, Inject 1 mL under the skin into the appropriate area as directed 1 (One) Time Per Week., Disp: 4 mL, Rfl: 3    predniSONE (DELTASONE) 5 MG tablet, Take 1 tablet by mouth Daily As Needed., Disp: , Rfl:     azithromycin (Zithromax) 500 MG tablet, Take 1 tablet by mouth Daily., Disp: 5 tablet, Rfl: 0    BD Syringe Slip Tip 25G X 5/8\" 1 ML misc, USE WITH METHOTREXATE INJECTION ONCE WEEKLY, Disp: , Rfl:     benzonatate (Tessalon Perles) 100 MG capsule, Take 1 capsule by mouth 3 (Three) Times a Day As Needed for Cough., Disp: 30 capsule, Rfl: 1    ipratropium-albuterol (DUO-NEB) 0.5-2.5 mg/3 ml nebulizer, Take 3 mL by nebulization Every 4 (Four) Hours As Needed for Wheezing or Shortness of Air., Disp: 90 mL, Rfl: 3    predniSONE 5 MG (21) tablet therapy pack dose pack, Take 1 tablet by mouth Daily. Take as directed on package instructions., Disp: 21 tablet, Rfl: 0    Sod Picosulfate-Mag Ox-Cit Acd 10-3.5-12 MG-GM-GM pack, , Disp: , Rfl:     vitamin D " "(ERGOCALCIFEROL) 1.25 MG (43311 UT) capsule capsule, Take 1 capsule by mouth 1 (One) Time Per Week., Disp: , Rfl:     Allergies:   No Known Allergies    Objective       Vital Signs:   Vitals:    10/28/24 1121   BP: 140/84   BP Location: Left arm   Patient Position: Sitting   Cuff Size: Adult   Pulse: 104   Resp: 24   Temp: 98 °F (36.7 °C)   TempSrc: Tympanic   SpO2: 96%   Weight: 98.9 kg (218 lb)   Height: 182.9 cm (72\")     Body mass index is 29.57 kg/m².    Physical Exam:  Physical Exam  Constitutional:       General: He is not in acute distress.     Appearance: Normal appearance. He is not ill-appearing.   HENT:      Right Ear: Tympanic membrane normal.      Left Ear: Tympanic membrane and ear canal normal.      Nose: No congestion or rhinorrhea.      Mouth/Throat:      Mouth: Mucous membranes are moist.      Pharynx: No oropharyngeal exudate.   Eyes:      Extraocular Movements: Extraocular movements intact.      Conjunctiva/sclera: Conjunctivae normal.      Pupils: Pupils are equal, round, and reactive to light.   Cardiovascular:      Rate and Rhythm: Regular rhythm. Tachycardia present.      Heart sounds: No murmur heard.  Pulmonary:      Effort: Pulmonary effort is normal. No respiratory distress.      Breath sounds: Wheezing present.      Comments: Egophany in RLL    Abdominal:      General: Abdomen is flat. Bowel sounds are normal.      Palpations: Abdomen is soft.      Tenderness: There is no abdominal tenderness.   Musculoskeletal:      Right lower leg: No edema.      Left lower leg: No edema.   Skin:     General: Skin is warm and dry.      Findings: No rash.   Neurological:      General: No focal deficit present.      Mental Status: He is alert and oriented to person, place, and time. Mental status is at baseline.   Psychiatric:         Mood and Affect: Mood normal.         Behavior: Behavior normal.         Procedures    Results:       Assessment / Plan      Assessment/Plan:   Diagnoses and all orders " for this visit:    1. Acute bronchitis, unspecified organism (Primary)  -     azithromycin (Zithromax) 500 MG tablet; Take 1 tablet by mouth Daily.  Dispense: 5 tablet; Refill: 0  -     predniSONE 5 MG (21) tablet therapy pack dose pack; Take 1 tablet by mouth Daily. Take as directed on package instructions.  Dispense: 21 tablet; Refill: 0  -     XR Chest PA & Lateral; Future  -     ipratropium-albuterol (DUO-NEB) 0.5-2.5 mg/3 ml nebulizer; Take 3 mL by nebulization Every 4 (Four) Hours As Needed for Wheezing or Shortness of Air.  Dispense: 90 mL; Refill: 3    Other orders  -     benzonatate (Tessalon Perles) 100 MG capsule; Take 1 capsule by mouth 3 (Three) Times a Day As Needed for Cough.  Dispense: 30 capsule; Refill: 1      RLL pna likely on exam. Cxr ordered.             Follow Up:   No follow-ups on file.    DO ESCOBAR Chandler

## 2024-10-28 NOTE — TELEPHONE ENCOUNTER
Pharmacy Name:  [facility]     Pharmacy representative phone number:  [phone] (Work)     What medication are you calling in regards to:  AUGMENTATION AND AZITHROMYCIN       What question does the pharmacy have:  CLARIFY DO YOU WANT THE PATIENT TO HAVE BOTH MEDICATIONS FILLED  BECAUSE THEY TYPICALLY DON'T SEE PATIENTS ON BOTH      Who is the provider that prescribed the medication: GA OBRIEN     PLEASE CALL

## 2024-10-29 ENCOUNTER — PATIENT MESSAGE (OUTPATIENT)
Age: 64
End: 2024-10-29
Payer: COMMERCIAL

## 2024-10-29 DIAGNOSIS — I50.9 ACUTE HEART FAILURE, UNSPECIFIED HEART FAILURE TYPE: Primary | ICD-10-CM

## 2024-10-29 RX ORDER — FUROSEMIDE 40 MG/1
40 TABLET ORAL DAILY
Qty: 30 TABLET | Refills: 0 | Status: ON HOLD | OUTPATIENT
Start: 2024-10-29

## 2024-10-30 ENCOUNTER — HOSPITAL ENCOUNTER (OUTPATIENT)
Dept: CARDIOLOGY | Facility: HOSPITAL | Age: 64
Discharge: HOME | End: 2024-10-30
Admitting: INTERNAL MEDICINE
Payer: COMMERCIAL

## 2024-10-30 ENCOUNTER — TELEPHONE (OUTPATIENT)
Dept: INTERNAL MEDICINE | Facility: CLINIC | Age: 64
End: 2024-10-30
Payer: COMMERCIAL

## 2024-10-30 VITALS
HEIGHT: 72 IN | SYSTOLIC BLOOD PRESSURE: 140 MMHG | DIASTOLIC BLOOD PRESSURE: 116 MMHG | BODY MASS INDEX: 29.53 KG/M2 | WEIGHT: 218 LBS

## 2024-10-30 DIAGNOSIS — I50.9 ACUTE HEART FAILURE, UNSPECIFIED HEART FAILURE TYPE: Primary | ICD-10-CM

## 2024-10-30 DIAGNOSIS — I50.9 ACUTE HEART FAILURE, UNSPECIFIED HEART FAILURE TYPE: ICD-10-CM

## 2024-10-30 LAB
ASCENDING AORTA: 3.7 CM
BH CV ECHO MEAS - AO MAX PG: 3.4 MMHG
BH CV ECHO MEAS - AO ROOT DIAM: 3.5 CM
BH CV ECHO MEAS - AO V2 MAX: 92.2 CM/SEC
BH CV ECHO MEAS - EF(MOD-BP): 39.4 %
BH CV ECHO MEAS - EF(MOD-SP2): 40.9 %
BH CV ECHO MEAS - EF(MOD-SP4): 40.4 %
BH CV ECHO MEAS - IVS/LVPW: 1 CM
BH CV ECHO MEAS - IVSD: 1 CM
BH CV ECHO MEAS - LA DIMENSION: 4.5 CM
BH CV ECHO MEAS - LV MAX PG: 3.6 MMHG
BH CV ECHO MEAS - LV MEAN PG: 1.4 MMHG
BH CV ECHO MEAS - LV V1 MAX: 94.8 CM/SEC
BH CV ECHO MEAS - LV V1 VTI: 13 CM
BH CV ECHO MEAS - LVIDD: 5.3 CM
BH CV ECHO MEAS - LVIDS: 3.7 CM
BH CV ECHO MEAS - LVOT DIAM: 2.2 CM
BH CV ECHO MEAS - LVPWD: 1 CM
BH CV ECHO MEAS - MV E MAX VEL: 108.8 CM/SEC
BH CV ECHO MEAS - MV MAX PG: 5.4 MMHG
BH CV ECHO MEAS - MV MEAN PG: 2.9 MMHG
BH CV ECHO MEAS - PA ACC TIME: 0.11 SEC
BH CV ECHO MEAS - RAP SYSTOLE: 3 MMHG
BH CV ECHO MEAS - RVSP: 35 MMHG
BH CV ECHO MEAS - TAPSE (>1.6): 1.62 CM
BH CV ECHO MEAS - TR MAX PG: 33.3 MMHG
BH CV ECHO MEAS - TR MAX VEL: 288.7 CM/SEC
BH CV XLRA - RV BASE: 5.1 CM
BH CV XLRA - RV LENGTH: 8.6 CM
BH CV XLRA - RV MID: 4.1 CM
BH CV XLRA - TDI S': 12.9 CM/SEC

## 2024-10-30 PROCEDURE — 93306 TTE W/DOPPLER COMPLETE: CPT | Performed by: INTERNAL MEDICINE

## 2024-10-30 PROCEDURE — 93306 TTE W/DOPPLER COMPLETE: CPT

## 2024-10-30 RX ORDER — METOPROLOL SUCCINATE 25 MG/1
25 TABLET, EXTENDED RELEASE ORAL DAILY
Qty: 30 TABLET | Refills: 5 | Status: ON HOLD | OUTPATIENT
Start: 2024-10-30

## 2024-10-30 NOTE — TELEPHONE ENCOUNTER
Hub can relay message to patient     Attempted to contact the pt to inform him he has been scheduled to see [facility] for the ECHO today Wednesday 10/30/24 arriving at 10:30am for an 11am the office is located at the [address] [place] [address], [place], [place], [facility], if you have any questions or need to reschedule the appt you may call [phone].

## 2024-11-01 ENCOUNTER — DOCUMENTATION (OUTPATIENT)
Dept: INTERNAL MEDICINE | Facility: CLINIC | Age: 64
End: 2024-11-01
Payer: COMMERCIAL

## 2024-11-01 ENCOUNTER — APPOINTMENT (OUTPATIENT)
Dept: CT IMAGING | Facility: HOSPITAL | Age: 64
End: 2024-11-01
Payer: COMMERCIAL

## 2024-11-01 ENCOUNTER — APPOINTMENT (OUTPATIENT)
Dept: GENERAL RADIOLOGY | Facility: HOSPITAL | Age: 64
End: 2024-11-01
Payer: COMMERCIAL

## 2024-11-01 ENCOUNTER — HOSPITAL ENCOUNTER (INPATIENT)
Facility: HOSPITAL | Age: 64
LOS: 2 days | Discharge: HOME | End: 2024-11-06
Attending: EMERGENCY MEDICINE | Admitting: INTERNAL MEDICINE
Payer: COMMERCIAL

## 2024-11-01 DIAGNOSIS — I50.20 HFREF (HEART FAILURE WITH REDUCED EJECTION FRACTION): ICD-10-CM

## 2024-11-01 DIAGNOSIS — R07.9 CHEST PAIN, UNSPECIFIED TYPE: Primary | ICD-10-CM

## 2024-11-01 DIAGNOSIS — J21.0 RSV (ACUTE BRONCHIOLITIS DUE TO RESPIRATORY SYNCYTIAL VIRUS): ICD-10-CM

## 2024-11-01 DIAGNOSIS — I48.91 ATRIAL FIBRILLATION, UNSPECIFIED TYPE: ICD-10-CM

## 2024-11-01 PROBLEM — M05.79 RHEUMATOID ARTHRITIS INVOLVING MULTIPLE SITES WITH POSITIVE RHEUMATOID FACTOR: Status: RESOLVED | Noted: 2018-01-31 | Resolved: 2024-11-01

## 2024-11-01 LAB
ALBUMIN SERPL-MCNC: 4.3 G/DL (ref 3.5–5.2)
ALBUMIN/GLOB SERPL: 1.5 G/DL
ALP SERPL-CCNC: 91 U/L (ref 39–117)
ALT SERPL W P-5'-P-CCNC: 47 U/L (ref 1–41)
ANION GAP SERPL CALCULATED.3IONS-SCNC: 15 MMOL/L (ref 5–15)
APTT PPP: 31.7 SECONDS (ref 60–90)
AST SERPL-CCNC: 32 U/L (ref 1–40)
B PARAPERT DNA SPEC QL NAA+PROBE: NOT DETECTED
B PERT DNA SPEC QL NAA+PROBE: NOT DETECTED
BASOPHILS # BLD AUTO: 0.07 10*3/MM3 (ref 0–0.2)
BASOPHILS NFR BLD AUTO: 0.8 % (ref 0–1.5)
BILIRUB SERPL-MCNC: 1.5 MG/DL (ref 0–1.2)
BUN SERPL-MCNC: 22 MG/DL (ref 8–23)
BUN/CREAT SERPL: 25.6 (ref 7–25)
C PNEUM DNA NPH QL NAA+NON-PROBE: NOT DETECTED
CALCIUM SPEC-SCNC: 9.7 MG/DL (ref 8.6–10.5)
CHLORIDE SERPL-SCNC: 101 MMOL/L (ref 98–107)
CO2 SERPL-SCNC: 26 MMOL/L (ref 22–29)
CREAT SERPL-MCNC: 0.86 MG/DL (ref 0.76–1.27)
CRP SERPL-MCNC: 2.58 MG/DL (ref 0–0.5)
DEPRECATED RDW RBC AUTO: 50.2 FL (ref 37–54)
EGFRCR SERPLBLD CKD-EPI 2021: 95.5 ML/MIN/1.73
EOSINOPHIL # BLD AUTO: 0.13 10*3/MM3 (ref 0–0.4)
EOSINOPHIL NFR BLD AUTO: 1.4 % (ref 0.3–6.2)
ERYTHROCYTE [DISTWIDTH] IN BLOOD BY AUTOMATED COUNT: 14.6 % (ref 12.3–15.4)
FLUAV SUBTYP SPEC NAA+PROBE: NOT DETECTED
FLUBV RNA ISLT QL NAA+PROBE: NOT DETECTED
GLOBULIN UR ELPH-MCNC: 2.8 GM/DL
GLUCOSE SERPL-MCNC: 94 MG/DL (ref 65–99)
HADV DNA SPEC NAA+PROBE: NOT DETECTED
HCOV 229E RNA SPEC QL NAA+PROBE: NOT DETECTED
HCOV HKU1 RNA SPEC QL NAA+PROBE: NOT DETECTED
HCOV NL63 RNA SPEC QL NAA+PROBE: NOT DETECTED
HCOV OC43 RNA SPEC QL NAA+PROBE: NOT DETECTED
HCT VFR BLD AUTO: 48.5 % (ref 37.5–51)
HGB BLD-MCNC: 16.3 G/DL (ref 13–17.7)
HMPV RNA NPH QL NAA+NON-PROBE: NOT DETECTED
HOLD SPECIMEN: NORMAL
HPIV1 RNA ISLT QL NAA+PROBE: NOT DETECTED
HPIV2 RNA SPEC QL NAA+PROBE: NOT DETECTED
HPIV3 RNA NPH QL NAA+PROBE: NOT DETECTED
HPIV4 P GENE NPH QL NAA+PROBE: NOT DETECTED
IMM GRANULOCYTES # BLD AUTO: 0.07 10*3/MM3 (ref 0–0.05)
IMM GRANULOCYTES NFR BLD AUTO: 0.8 % (ref 0–0.5)
INR PPP: 1.05 (ref 0.89–1.12)
LYMPHOCYTES # BLD AUTO: 1.22 10*3/MM3 (ref 0.7–3.1)
LYMPHOCYTES NFR BLD AUTO: 13.2 % (ref 19.6–45.3)
M PNEUMO IGG SER IA-ACNC: NOT DETECTED
MAGNESIUM SERPL-MCNC: 1.9 MG/DL (ref 1.6–2.4)
MCH RBC QN AUTO: 31.5 PG (ref 26.6–33)
MCHC RBC AUTO-ENTMCNC: 33.6 G/DL (ref 31.5–35.7)
MCV RBC AUTO: 93.6 FL (ref 79–97)
MONOCYTES # BLD AUTO: 1.04 10*3/MM3 (ref 0.1–0.9)
MONOCYTES NFR BLD AUTO: 11.3 % (ref 5–12)
NEUTROPHILS NFR BLD AUTO: 6.69 10*3/MM3 (ref 1.7–7)
NEUTROPHILS NFR BLD AUTO: 72.5 % (ref 42.7–76)
NRBC BLD AUTO-RTO: 0 /100 WBC (ref 0–0.2)
NT-PROBNP SERPL-MCNC: 1569 PG/ML (ref 0–900)
PLATELET # BLD AUTO: 326 10*3/MM3 (ref 140–450)
PMV BLD AUTO: 9.4 FL (ref 6–12)
POTASSIUM SERPL-SCNC: 3.6 MMOL/L (ref 3.5–5.2)
PROCALCITONIN SERPL-MCNC: 0.08 NG/ML (ref 0–0.25)
PROT SERPL-MCNC: 7.1 G/DL (ref 6–8.5)
PROTHROMBIN TIME: 13.9 SECONDS (ref 12.2–14.5)
RBC # BLD AUTO: 5.18 10*6/MM3 (ref 4.14–5.8)
RHINOVIRUS RNA SPEC NAA+PROBE: NOT DETECTED
RSV RNA NPH QL NAA+NON-PROBE: DETECTED
SARS-COV-2 RNA NPH QL NAA+NON-PROBE: NOT DETECTED
SODIUM SERPL-SCNC: 142 MMOL/L (ref 136–145)
TROPONIN T SERPL HS-MCNC: 22 NG/L
TSH SERPL DL<=0.05 MIU/L-ACNC: 1.27 UIU/ML (ref 0.27–4.2)
WBC NRBC COR # BLD AUTO: 9.22 10*3/MM3 (ref 3.4–10.8)
WHOLE BLOOD HOLD COAG: NORMAL
WHOLE BLOOD HOLD SPECIMEN: NORMAL

## 2024-11-01 PROCEDURE — 94640 AIRWAY INHALATION TREATMENT: CPT

## 2024-11-01 PROCEDURE — 84145 PROCALCITONIN (PCT): CPT | Performed by: NURSE PRACTITIONER

## 2024-11-01 PROCEDURE — G0378 HOSPITAL OBSERVATION PER HR: HCPCS

## 2024-11-01 PROCEDURE — 85610 PROTHROMBIN TIME: CPT | Performed by: NURSE PRACTITIONER

## 2024-11-01 PROCEDURE — 83880 ASSAY OF NATRIURETIC PEPTIDE: CPT | Performed by: EMERGENCY MEDICINE

## 2024-11-01 PROCEDURE — 99223 1ST HOSP IP/OBS HIGH 75: CPT | Performed by: INTERNAL MEDICINE

## 2024-11-01 PROCEDURE — 93010 ELECTROCARDIOGRAM REPORT: CPT | Performed by: STUDENT IN AN ORGANIZED HEALTH CARE EDUCATION/TRAINING PROGRAM

## 2024-11-01 PROCEDURE — 80050 GENERAL HEALTH PANEL: CPT | Performed by: EMERGENCY MEDICINE

## 2024-11-01 PROCEDURE — 93005 ELECTROCARDIOGRAM TRACING: CPT

## 2024-11-01 PROCEDURE — 25510000001 IOPAMIDOL PER 1 ML: Performed by: EMERGENCY MEDICINE

## 2024-11-01 PROCEDURE — 84484 ASSAY OF TROPONIN QUANT: CPT | Performed by: EMERGENCY MEDICINE

## 2024-11-01 PROCEDURE — 71045 X-RAY EXAM CHEST 1 VIEW: CPT

## 2024-11-01 PROCEDURE — 86140 C-REACTIVE PROTEIN: CPT | Performed by: NURSE PRACTITIONER

## 2024-11-01 PROCEDURE — 25010000002 HEPARIN (PORCINE) 25000-0.45 UT/250ML-% SOLUTION: Performed by: NURSE PRACTITIONER

## 2024-11-01 PROCEDURE — 0202U NFCT DS 22 TRGT SARS-COV-2: CPT | Performed by: NURSE PRACTITIONER

## 2024-11-01 PROCEDURE — 85730 THROMBOPLASTIN TIME PARTIAL: CPT | Performed by: NURSE PRACTITIONER

## 2024-11-01 PROCEDURE — 99291 CRITICAL CARE FIRST HOUR: CPT

## 2024-11-01 PROCEDURE — 25010000002 HEPARIN (PORCINE) PER 1000 UNITS: Performed by: NURSE PRACTITIONER

## 2024-11-01 PROCEDURE — 83735 ASSAY OF MAGNESIUM: CPT | Performed by: NURSE PRACTITIONER

## 2024-11-01 PROCEDURE — 93005 ELECTROCARDIOGRAM TRACING: CPT | Performed by: EMERGENCY MEDICINE

## 2024-11-01 PROCEDURE — 71275 CT ANGIOGRAPHY CHEST: CPT

## 2024-11-01 RX ORDER — METOPROLOL TARTRATE 1 MG/ML
5 INJECTION, SOLUTION INTRAVENOUS ONCE
Status: DISCONTINUED | OUTPATIENT
Start: 2024-11-01 | End: 2024-11-01

## 2024-11-01 RX ORDER — HEPARIN SODIUM 1000 [USP'U]/ML
4000 INJECTION, SOLUTION INTRAVENOUS; SUBCUTANEOUS ONCE
Status: COMPLETED | OUTPATIENT
Start: 2024-11-01 | End: 2024-11-01

## 2024-11-01 RX ORDER — SODIUM CHLORIDE 0.9 % (FLUSH) 0.9 %
10 SYRINGE (ML) INJECTION AS NEEDED
Status: DISCONTINUED | OUTPATIENT
Start: 2024-11-01 | End: 2024-11-06 | Stop reason: HOSPADM

## 2024-11-01 RX ORDER — IPRATROPIUM BROMIDE AND ALBUTEROL SULFATE 2.5; .5 MG/3ML; MG/3ML
3 SOLUTION RESPIRATORY (INHALATION) ONCE
Status: COMPLETED | OUTPATIENT
Start: 2024-11-01 | End: 2024-11-01

## 2024-11-01 RX ORDER — IOPAMIDOL 755 MG/ML
100 INJECTION, SOLUTION INTRAVASCULAR
Status: COMPLETED | OUTPATIENT
Start: 2024-11-01 | End: 2024-11-01

## 2024-11-01 RX ORDER — DILTIAZEM HCL/D5W 125 MG/125
5-15 PLASTIC BAG, INJECTION (ML) INTRAVENOUS CONTINUOUS
Status: DISCONTINUED | OUTPATIENT
Start: 2024-11-02 | End: 2024-11-06 | Stop reason: HOSPADM

## 2024-11-01 RX ORDER — HEPARIN SODIUM 10000 [USP'U]/100ML
15 INJECTION, SOLUTION INTRAVENOUS
Status: DISCONTINUED | OUTPATIENT
Start: 2024-11-01 | End: 2024-11-02

## 2024-11-01 RX ORDER — METOPROLOL TARTRATE 25 MG/1
25 TABLET, FILM COATED ORAL EVERY 6 HOURS SCHEDULED
Status: DISCONTINUED | OUTPATIENT
Start: 2024-11-01 | End: 2024-11-02

## 2024-11-01 RX ORDER — METOPROLOL TARTRATE 1 MG/ML
5 INJECTION, SOLUTION INTRAVENOUS ONCE
Status: COMPLETED | OUTPATIENT
Start: 2024-11-01 | End: 2024-11-01

## 2024-11-01 RX ADMIN — HEPARIN SODIUM 10 UNITS/KG/HR: 10000 INJECTION, SOLUTION INTRAVENOUS at 21:10

## 2024-11-01 RX ADMIN — METOPROLOL TARTRATE 25 MG: 25 TABLET, FILM COATED ORAL at 21:21

## 2024-11-01 RX ADMIN — METOPROLOL TARTRATE 5 MG: 5 INJECTION INTRAVENOUS at 21:19

## 2024-11-01 RX ADMIN — IPRATROPIUM BROMIDE AND ALBUTEROL SULFATE 3 ML: 2.5; .5 SOLUTION RESPIRATORY (INHALATION) at 21:50

## 2024-11-01 RX ADMIN — IOPAMIDOL 85 ML: 755 INJECTION, SOLUTION INTRAVENOUS at 19:25

## 2024-11-01 RX ADMIN — HEPARIN SODIUM 4000 UNITS: 1000 INJECTION INTRAVENOUS; SUBCUTANEOUS at 21:09

## 2024-11-01 NOTE — Clinical Note
Level of Care: Telemetry [5]   Diagnosis: Atrial fibrillation with RVR [720227]   Admitting Physician: WILLIE BOLAND III [[id number]   Attending Physician: WILILE BOLAND III [[id number]   Bed Request Comments: tele obs not cdu

## 2024-11-01 NOTE — PROGRESS NOTES
Saw the patient recently in the office for wheezing.  Initially thought to be possibly related to a viral illness with possible acute bronchitis.  Placed on albuterol and steroids.  Upon further investigation, his chest x-ray revealed bilateral interstitial disease consistent with pulmonary edema.  BNP was then checked and noted to be elevated at 2000.  He was started on Lasix.  Initially once per day, increased to twice per day over the past few days.  He has had improvement overall in his breathing, however today when standing for early voting he began to feel ill.  He had associated shortness of breath with diaphoresis and some chest pressure.  These are all new symptoms for him other than the shortness of breath.  His symptoms seem to resolve after 45 minutes, followed by significant fatigue throughout the rest of the day.  He message me with the symptoms on epic.  I have called the patient and advised him to go to the ER.  Recently I had set him up outpatient with Dr. Kendrick as he was not having any chest pressure.  Given his medical history of rheumatoid arthritis, his single greatest mortality risk is coronary artery disease.  I feel it is warranted to admit the patient for at minimum stress testing if not cardiac catheterization   Stable

## 2024-11-01 NOTE — ED PROVIDER NOTES
EMERGENCY DEPARTMENT ENCOUNTER    Pt Name: Spenser Pascal  MRN: [id number]  Pt :   1960  Room Number:    Date of encounter:  2024  PCP: Jaciel Alex APRN  ED Provider: MOON Escamilla    Historian: Patient      HPI:  Chief Complaint: Chest pain, shortness        Context: Spenser Pascal is a 64 y.o. male who presents to the ED c/o chest pain, shortness of breath.  Patient reports onset of shortness of breath last week on Wednesday with cough and shortness of breath, was seen on Monday by his PCP, due to concern for cardiomegaly on chest x-ray patient was evaluated with  echo and was scheduled to follow-up with .  Yesterday he was standing in line to vote when he had sudden onset of cold sweats, chest pain and pressure and shortness of breath.  States chest pain symptoms resolved, but he has ongoing shortness of breath especially with exertion.  He was treated for upper respiratory infection/pneumonia with dual antibiotic therapy and prednisone, reports no improvement of the symptoms.  Furthermore started on Lasix 2 days ago      PAST MEDICAL HISTORY  Past Medical History:   Diagnosis Date    Arthritis     Broken bones     thumb and toe    Cancer     Squamous cell carcinoma    Colon polyp     No others since    Diverticulosis ?    HL (hearing loss) ?    Hypertension ?    Hypothyroidism ?    Otosclerosis     Rheumatoid arthritis          PAST SURGICAL HISTORY  Past Surgical History:   Procedure Laterality Date    COLONOSCOPY  Oct 2023    FRACTURE SURGERY      JOINT REPLACEMENT      LAMINECTOMY      LIPOMA EXCISION      REPLACEMENT TOTAL KNEE      SALIVARY GLAND SURGERY      SPINE SURGERY      Also in  and          FAMILY HISTORY  Family History   Problem Relation Age of Onset    Breast cancer Mother     Cancer Mother         Breast    Hypothyroidism Father     Parkinsonism Father     Hypertension Father     Thyroid disease  Father     Other (gall bladder removal) Father     Hearing loss Sister     Hearing loss Brother     Cancer Maternal Grandmother         Breast    Heart disease Maternal Grandmother     Heart disease Maternal Grandfather     Arthritis Paternal Grandmother     Hearing loss Paternal Grandmother     Hypertension Paternal Grandmother     Cancer Paternal Grandfather         Liver         SOCIAL HISTORY  Social History     Socioeconomic History    Marital status:    Tobacco Use    Smoking status: Never    Smokeless tobacco: Never   Vaping Use    Vaping status: Never Used   Substance and Sexual Activity    Alcohol use: Yes     Alcohol/week: 6.0 standard drinks of alcohol     Types: 3 Glasses of wine, 3 Cans of beer per week    Drug use: No    Sexual activity: Not Currently     Partners: Female         ALLERGIES  Patient has no known allergies.        REVIEW OF SYSTEMS  Review of Systems       All systems reviewed and negative except for those discussed in HPI.       PHYSICAL EXAM    I have reviewed the triage vital signs and nursing notes.    ED Triage Vitals [11/01/24 1756]   Temp Heart Rate Resp BP SpO2   97.6 °F (36.4 °C) (!) 130 18 123/65 92 %      Temp src Heart Rate Source Patient Position BP Location FiO2 (%)   Oral Monitor Sitting Left arm --       Physical Exam  GENERAL:   Appears in no acute distress.   HENT: Nares patent.  EYES: No scleral icterus.  CV: Irregular rhythm, tachycardia, varicoceles bilateral lower extremities  RESPIRATORY: Normal effort.  Wheezes throughout all lung fields  ABDOMEN: Soft, nontender  MUSCULOSKELETAL: No deformities.   NEURO: Alert, moves all extremities, follows commands.  SKIN: Warm, dry, no rash visualized.      LAB RESULTS  Recent Results (from the past 24 hours)   ECG 12 Lead ED Triage Standing Order; SOA    Collection Time: 11/01/24  6:04 PM   Result Value Ref Range    QT Interval 290 ms    QTC Interval 453 ms   Comprehensive Metabolic Panel    Collection Time: 11/01/24   6:19 PM    Specimen: Blood   Result Value Ref Range    Glucose 94 65 - 99 mg/dL    BUN 22 8 - 23 mg/dL    Creatinine 0.86 0.76 - 1.27 mg/dL    Sodium 142 136 - 145 mmol/L    Potassium 3.6 3.5 - 5.2 mmol/L    Chloride 101 98 - 107 mmol/L    CO2 26.0 22.0 - 29.0 mmol/L    Calcium 9.7 8.6 - 10.5 mg/dL    Total Protein 7.1 6.0 - 8.5 g/dL    Albumin 4.3 3.5 - 5.2 g/dL    ALT (SGPT) 47 (H) 1 - 41 U/L    AST (SGOT) 32 1 - 40 U/L    Alkaline Phosphatase 91 39 - 117 U/L    Total Bilirubin 1.5 (H) 0.0 - 1.2 mg/dL    Globulin 2.8 gm/dL    A/G Ratio 1.5 g/dL    BUN/Creatinine Ratio 25.6 (H) 7.0 - 25.0    Anion Gap 15.0 5.0 - 15.0 mmol/L    eGFR 95.5 >60.0 mL/min/1.73   BNP    Collection Time: 11/01/24  6:19 PM    Specimen: Blood   Result Value Ref Range    proBNP 1,569.0 (H) 0.0 - 900.0 pg/mL   Single High Sensitivity Troponin T    Collection Time: 11/01/24  6:19 PM    Specimen: Blood   Result Value Ref Range    HS Troponin T 22 (H) <22 ng/L   Green Top (Gel)    Collection Time: 11/01/24  6:19 PM   Result Value Ref Range    Extra Tube Hold for add-ons.    Lavender Top    Collection Time: 11/01/24  6:19 PM   Result Value Ref Range    Extra Tube hold for add-on    Gold Top - SST    Collection Time: 11/01/24  6:19 PM   Result Value Ref Range    Extra Tube Hold for add-ons.    Gray Top    Collection Time: 11/01/24  6:19 PM   Result Value Ref Range    Extra Tube Hold for add-ons.    Light Blue Top    Collection Time: 11/01/24  6:19 PM   Result Value Ref Range    Extra Tube Hold for add-ons.    CBC Auto Differential    Collection Time: 11/01/24  6:19 PM    Specimen: Blood   Result Value Ref Range    WBC 9.22 3.40 - 10.80 10*3/mm3    RBC 5.18 4.14 - 5.80 10*6/mm3    Hemoglobin 16.3 13.0 - 17.7 g/dL    Hematocrit 48.5 37.5 - 51.0 %    MCV 93.6 79.0 - 97.0 fL    MCH 31.5 26.6 - 33.0 pg    MCHC 33.6 31.5 - 35.7 g/dL    RDW 14.6 12.3 - 15.4 %    RDW-SD 50.2 37.0 - 54.0 fl    MPV 9.4 6.0 - 12.0 fL    Platelets 326 140 - 450 10*3/mm3     Neutrophil % 72.5 42.7 - 76.0 %    Lymphocyte % 13.2 (L) 19.6 - 45.3 %    Monocyte % 11.3 5.0 - 12.0 %    Eosinophil % 1.4 0.3 - 6.2 %    Basophil % 0.8 0.0 - 1.5 %    Immature Grans % 0.8 (H) 0.0 - 0.5 %    Neutrophils, Absolute 6.69 1.70 - 7.00 10*3/mm3    Lymphocytes, Absolute 1.22 0.70 - 3.10 10*3/mm3    Monocytes, Absolute 1.04 (H) 0.10 - 0.90 10*3/mm3    Eosinophils, Absolute 0.13 0.00 - 0.40 10*3/mm3    Basophils, Absolute 0.07 0.00 - 0.20 10*3/mm3    Immature Grans, Absolute 0.07 (H) 0.00 - 0.05 10*3/mm3    nRBC 0.0 0.0 - 0.2 /100 WBC   Procalcitonin    Collection Time: 11/01/24  6:19 PM    Specimen: Blood   Result Value Ref Range    Procalcitonin 0.08 0.00 - 0.25 ng/mL   C-reactive Protein    Collection Time: 11/01/24  6:19 PM    Specimen: Blood   Result Value Ref Range    C-Reactive Protein 2.58 (H) 0.00 - 0.50 mg/dL   TSH Rfx On Abnormal To Free T4    Collection Time: 11/01/24  6:19 PM    Specimen: Blood   Result Value Ref Range    TSH 1.270 0.270 - 4.200 uIU/mL   Protime-INR    Collection Time: 11/01/24  6:19 PM    Specimen: Blood   Result Value Ref Range    Protime 13.9 12.2 - 14.5 Seconds    INR 1.05 0.89 - 1.12   aPTT    Collection Time: 11/01/24  6:19 PM    Specimen: Blood   Result Value Ref Range    PTT 31.7 (L) 60.0 - 90.0 seconds   Respiratory Panel PCR w/COVID-19(SARS-CoV-2) [facility]/PIETER/URIEL/PAD/[facility]/TIFFANIE In-House, NP Swab in UTM/VTM, 2 HR TAT - Swab, Nasopharynx    Collection Time: 11/01/24  7:13 PM    Specimen: Nasopharynx; Swab   Result Value Ref Range    ADENOVIRUS, PCR Not Detected Not Detected    Coronavirus 229E Not Detected Not Detected    Coronavirus HKU1 Not Detected Not Detected    Coronavirus NL63 Not Detected Not Detected    Coronavirus OC43 Not Detected Not Detected    COVID19 Not Detected Not Detected - Ref. Range    Human Metapneumovirus Not Detected Not Detected    Human Rhinovirus/Enterovirus Not Detected Not Detected    Influenza A PCR Not Detected Not Detected    Influenza B PCR Not  Detected Not Detected    Parainfluenza Virus 1 Not Detected Not Detected    Parainfluenza Virus 2 Not Detected Not Detected    Parainfluenza Virus 3 Not Detected Not Detected    Parainfluenza Virus 4 Not Detected Not Detected    RSV, PCR Detected (A) Not Detected    Bordetella pertussis pcr Not Detected Not Detected    Bordetella parapertussis PCR Not Detected Not Detected    Chlamydophila pneumoniae PCR Not Detected Not Detected    Mycoplasma pneumo by PCR Not Detected Not Detected       If labs were ordered, I independently reviewed the results and considered them in treating the patient.        RADIOLOGY  CT Angiogram Chest Pulmonary Embolism    Result Date: 11/1/2024  CT ANGIOGRAM CHEST PULMONARY EMBOLISM Date of Exam: 11/1/2024 7:18 PM EDT Indication: sob. Comparison: None available. Technique: Axial CT images were obtained of the chest after the uneventful intravenous administration of 85 cc Isovue-370 IV contrast utilizing pulmonary embolism protocol.  Reconstructed coronal and sagittal images were also obtained. Automated exposure  control and iterative construction methods were used. Findings: The thyroid gland is within normal limits. The subglottic airway is clear. No consolidation. No pneumothorax or pleural effusion. No pulmonary embolus. No evidence of aortic dissection. Mild atheromatous disease of the coronary vessels. Cholelithiasis without evidence of cholecystitis. Splenic capsular calcifications. Stable parenchymal thoracic vertebral body height and alignment are normal. No lytic or blastic disease. The posterior elements are intact. No rib fractures.     No pulmonary embolus. No acute cardiopulmonary disease. Electronically Signed: Ciro Weiner MD  11/1/2024 7:36 PM EDT  Workstation ID: [id number]    XR Chest 1 View    Result Date: 11/1/2024  XR CHEST 1 VW Date of Exam: 11/1/2024 6:26 PM EDT Indication: SOA triage protocol Comparison: 10/28/2024 Findings: No focal consolidation. No pneumothorax or  pleural effusion. Cardiomegaly. The visualized clavicles appear intact. No displaced rib fractures. The visualized upper abdomen is normal.     Impression: No acute cardiopulmonary disease. Electronically Signed: Ciro Weiner MD  11/1/2024 6:52 PM EDT  Workstation ID: [id number]     I ordered and independently reviewed the above noted radiographic studies.       PROCEDURES    Critical Care    Performed by: Vane Ritchie APRN  Authorized by: Vladimir Rodarte III, DO    Critical care provider statement:     Critical care time (minutes):  30    Critical care time was exclusive of:  Separately billable procedures and treating other patients    Critical care was necessary to treat or prevent imminent or life-threatening deterioration of the following conditions:  Cardiac failure and circulatory failure    Critical care was time spent personally by me on the following activities:  Blood draw for specimens, development of treatment plan with patient or surrogate, ordering and performing treatments and interventions, ordering and review of laboratory studies, ordering and review of radiographic studies, pulse oximetry, re-evaluation of patient's condition, review of old charts and discussions with consultants    Care discussed with: admitting provider        ECG 12 Lead ED Triage Standing Order; SOA   Preliminary Result   Test Reason : ED Triage Standing Order~   Blood Pressure :   */*   mmHG   Vent. Rate : 147 BPM     Atrial Rate : 127 BPM      P-R Int :   * ms          QRS Dur :  92 ms       QT Int : 290 ms       P-R-T Axes :   *  -4  19 degrees      QTc Int : 453 ms      Undetermined rhythm   Inferior infarct (cited on or before 26-DEC-2017)   Abnormal ECG   When compared with ECG of 26-DEC-2017 18:07,   Current undetermined rhythm precludes rhythm comparison, needs review   Nonspecific T wave abnormality has replaced inverted T waves in Inferior    leads   Nonspecific T wave abnormality now evident in Lateral  leads      Referred By:            Confirmed By:           MEDICATIONS GIVEN IN ER    Medications   sodium chloride 0.9 % flush 10 mL (has no administration in time range)   sodium chloride 0.9 % flush 10 mL (has no administration in time range)   metoprolol tartrate (LOPRESSOR) tablet 25 mg (25 mg Oral Given 11/1/24 2121)   heparin 79657 units/250 mL (100 units/mL) in 0.45 % NaCl infusion (10 Units/kg/hr × 98.9 kg Intravenous New Bag 11/1/24 2110)   Pharmacy to Dose Heparin (has no administration in time range)   metoprolol tartrate (LOPRESSOR) injection 5 mg (has no administration in time range)   iopamidol (ISOVUE-370) 76 % injection 100 mL (85 mL Intravenous Given 11/1/24 1925)   metoprolol tartrate (LOPRESSOR) injection 5 mg (5 mg Intravenous Given 11/1/24 2119)   heparin (porcine) injection 4,000 Units (4,000 Units Intravenous Given 11/1/24 2109)   ipratropium-albuterol (DUO-NEB) nebulizer solution 3 mL (3 mL Nebulization Given 11/1/24 2130)         MEDICAL DECISION MAKING, PROGRESS, and CONSULTS    All labs, if obtained, have been independently reviewed by me.  All radiology studies, if obtained, have been reviewed by me and the radiologist dictating the report.  All EKG's, if obtained, have been independently viewed and interpreted by me/my attending physician.      Discussion below represents my analysis of pertinent findings related to patient's condition, differential diagnosis, treatment plan and final disposition.  Patient is 64-year-old male presents for evaluation of shortness of breath, chest pain.  On physical exam he was nontoxic-appearing, anxious, wheezes noted throughout all lung fields, A-fib A-fib with PVCs noted on monitor -new onset.  Lab work was obtained significant for elevated proBNP 1569, normal white count, hemoglobin hematocrit, respiratory panel positive for RSV.  CT chest no PE or infiltrates.  I spoke with Dr. Spicer cardiology, started on heparin, 5 mg of IV metoprolol ordered.   Dr. Rodarte consulted for admission        HEART Score: 7                Differential diagnosis:    Acute coronary syndrome, coronary artery disease, CHF, cardiomegaly, cardiomyopathy, viral respiratory illness, bronchitis, PE      Additional sources:    - Discussed/ obtained information from independent historians: Daughter    - External (non-ED) record review: Internal medicine Dr. Shelton 11/1/24 visit  Clinical Indication    Heart Failure, Cardiomyopathy, or Sytemic or Pulmonary Hypertension   Dx: Acute heart failure, unspecified heart failure type [I50.9 (ICD-10-CM)]     Interpretation Summary         Left ventricular systolic function is moderately decreased. Calculated left ventricular EF = 39.4% Left ventricular ejection fraction appears to be 36 - 40%.    Left ventricular diastolic function was not fully assessed.    The right ventricular cavity is mildly dilated.    The left atrial cavity is mildly dilated.    The right atrial cavity is dilated.    Moderate mitral valve regurgitation is present.    Estimated right ventricular systolic pressure from tricuspid regurgitation is mildly elevated (35-45 mmHg).     No previous echocardiogram available for comparison.    - Chronic or social conditions impacting care: Rheumatoid arthritis    - Shared decision making: Patient      Orders placed during this visit:  Orders Placed This Encounter   Procedures    Critical Care    Respiratory Panel PCR w/COVID-19(SARS-CoV-2) [facility]/PIETER/URIEL/PAD/COR/TIFFANIE In-House, NP Swab in UTM/VTM, 2 HR TAT - Swab, Nasopharynx    XR Chest 1 View    CT Angiogram Chest Pulmonary Embolism    [place] Draw    Comprehensive Metabolic Panel    BNP    Single High Sensitivity Troponin T    CBC Auto Differential    Procalcitonin    C-reactive Protein    TSH Rfx On Abnormal To Free T4    Protime-INR    aPTT    Heparin Anti-Xa    NPO Diet NPO Type: Strict NPO    Undress & Gown    Continuous Pulse Oximetry    Vital Signs    Notify Provider Platelet Count  Less Than [id number]    Stop Infusion & Notify Provider if Bleeding Occurs    Oxygen Therapy- Nasal Cannula; Titrate 1-6 LPM Per SpO2; 90 - 95%    ECG 12 Lead ED Triage Standing Order; SOA    Insert Peripheral IV    Insert Peripheral IV    Initiate Observation Status    ED Bed Request    CBC & Differential    Green Top (Gel)    Lavender Top    Gold Top - SST    Gray Top    Light Blue Top    CBC & Differential         Additional orders considered but not ordered:      ED Course:    Consultants:      ED Course as of 11/01/24 2144 Fri Nov 01, 2024 1956 Cardiology paged [IR]   2004 Spoke with Dr. Spicer, advises heparin A-fib dose, metoprolol 5 mg IV and 25 mg every 6 oral, admission [IR]   2050 RSV, PCR(!): Detected [IR]   2142 ALT (SGPT)(!): 47 [IR]   2142 Total Bilirubin(!): 1.5 [IR]   2142 HS Troponin T(!): 22 [IR]   2142 C-Reactive Protein(!): 2.58 [IR]   2142 proBNP(!): 1,569.0 [IR]   2142 TSH Baseline: 1.270 [IR]      ED Course User Index  [IR] Vane Ritchie APRN              Shared Decision Making:  After my consideration of clinical presentation and any laboratory/radiology studies obtained, I discussed the findings with the patient/patient representative who is in agreement with the treatment plan and the final disposition.   Risks and benefits of discharge and/or observation/admission were discussed.       AS OF 21:44 EDT VITALS:    BP - 117/99  HR - 112  TEMP - 97.6 °F (36.4 °C) (Oral)  O2 SATS - 94%                  DIAGNOSIS  Final diagnoses:   Chest pain, unspecified type   RSV (acute bronchiolitis due to respiratory syncytial virus)   Atrial fibrillation, unspecified type         DISPOSITION  admit      Please note that portions of this document were completed with voice recognition software.     MOON Escamilla   11/01/24   21:44 EDT        Vane Ritchie APRN  11/01/24 2144

## 2024-11-02 LAB
ALBUMIN SERPL-MCNC: 4 G/DL (ref 3.5–5.2)
ALBUMIN/GLOB SERPL: 1.8 G/DL
ALP SERPL-CCNC: 84 U/L (ref 39–117)
ALT SERPL W P-5'-P-CCNC: 38 U/L (ref 1–41)
ANION GAP SERPL CALCULATED.3IONS-SCNC: 12 MMOL/L (ref 5–15)
AST SERPL-CCNC: 25 U/L (ref 1–40)
BASOPHILS # BLD AUTO: 0.07 10*3/MM3 (ref 0–0.2)
BASOPHILS NFR BLD AUTO: 0.9 % (ref 0–1.5)
BILIRUB SERPL-MCNC: 1.6 MG/DL (ref 0–1.2)
BUN SERPL-MCNC: 18 MG/DL (ref 8–23)
BUN/CREAT SERPL: 24.7 (ref 7–25)
CALCIUM SPEC-SCNC: 8.7 MG/DL (ref 8.6–10.5)
CHLORIDE SERPL-SCNC: 101 MMOL/L (ref 98–107)
CHOLEST SERPL-MCNC: 121 MG/DL (ref 0–200)
CO2 SERPL-SCNC: 26 MMOL/L (ref 22–29)
CREAT SERPL-MCNC: 0.73 MG/DL (ref 0.76–1.27)
DEPRECATED RDW RBC AUTO: 50.3 FL (ref 37–54)
EGFRCR SERPLBLD CKD-EPI 2021: 100.3 ML/MIN/1.73
EOSINOPHIL # BLD AUTO: 0.19 10*3/MM3 (ref 0–0.4)
EOSINOPHIL NFR BLD AUTO: 2.5 % (ref 0.3–6.2)
ERYTHROCYTE [DISTWIDTH] IN BLOOD BY AUTOMATED COUNT: 14.5 % (ref 12.3–15.4)
GEN 5 2HR TROPONIN T REFLEX: 19 NG/L
GLOBULIN UR ELPH-MCNC: 2.2 GM/DL
GLUCOSE SERPL-MCNC: 90 MG/DL (ref 65–99)
HBA1C MFR BLD: 5.5 % (ref 4.8–5.6)
HCT VFR BLD AUTO: 48.1 % (ref 37.5–51)
HDLC SERPL-MCNC: 50 MG/DL (ref 40–60)
HGB BLD-MCNC: 16.1 G/DL (ref 13–17.7)
IMM GRANULOCYTES # BLD AUTO: 0.07 10*3/MM3 (ref 0–0.05)
IMM GRANULOCYTES NFR BLD AUTO: 0.9 % (ref 0–0.5)
LDLC SERPL CALC-MCNC: 57 MG/DL (ref 0–100)
LDLC/HDLC SERPL: 1.16 {RATIO}
LYMPHOCYTES # BLD AUTO: 1.24 10*3/MM3 (ref 0.7–3.1)
LYMPHOCYTES NFR BLD AUTO: 16.5 % (ref 19.6–45.3)
MAGNESIUM SERPL-MCNC: 1.9 MG/DL (ref 1.6–2.4)
MCH RBC QN AUTO: 31.6 PG (ref 26.6–33)
MCHC RBC AUTO-ENTMCNC: 33.5 G/DL (ref 31.5–35.7)
MCV RBC AUTO: 94.5 FL (ref 79–97)
MONOCYTES # BLD AUTO: 0.82 10*3/MM3 (ref 0.1–0.9)
MONOCYTES NFR BLD AUTO: 10.9 % (ref 5–12)
NEUTROPHILS NFR BLD AUTO: 5.13 10*3/MM3 (ref 1.7–7)
NEUTROPHILS NFR BLD AUTO: 68.3 % (ref 42.7–76)
NRBC BLD AUTO-RTO: 0 /100 WBC (ref 0–0.2)
PHOSPHATE SERPL-MCNC: 3.8 MG/DL (ref 2.5–4.5)
PLAT MORPH BLD: NORMAL
PLATELET # BLD AUTO: 305 10*3/MM3 (ref 140–450)
PMV BLD AUTO: 9.8 FL (ref 6–12)
POTASSIUM SERPL-SCNC: 3.7 MMOL/L (ref 3.5–5.2)
POTASSIUM SERPL-SCNC: 3.8 MMOL/L (ref 3.5–5.2)
POTASSIUM SERPL-SCNC: 3.9 MMOL/L (ref 3.5–5.2)
PROT SERPL-MCNC: 6.2 G/DL (ref 6–8.5)
QT INTERVAL: 290 MS
QTC INTERVAL: 453 MS
RBC # BLD AUTO: 5.09 10*6/MM3 (ref 4.14–5.8)
RBC MORPH BLD: NORMAL
SODIUM SERPL-SCNC: 139 MMOL/L (ref 136–145)
T4 FREE SERPL-MCNC: 1.68 NG/DL (ref 0.92–1.68)
TRIGL SERPL-MCNC: 64 MG/DL (ref 0–150)
TROPONIN T DELTA: -2 NG/L
TROPONIN T SERPL HS-MCNC: 21 NG/L
UFH PPP CHRO-ACNC: 0.1 IU/ML (ref 0.3–0.7)
UFH PPP CHRO-ACNC: 0.15 IU/ML (ref 0.3–0.7)
UFH PPP CHRO-ACNC: 0.21 IU/ML (ref 0.3–0.7)
UFH PPP CHRO-ACNC: 0.32 IU/ML (ref 0.3–0.7)
VLDLC SERPL-MCNC: 14 MG/DL (ref 5–40)
WBC MORPH BLD: NORMAL
WBC NRBC COR # BLD AUTO: 7.52 10*3/MM3 (ref 3.4–10.8)

## 2024-11-02 PROCEDURE — 99232 SBSQ HOSP IP/OBS MODERATE 35: CPT | Performed by: STUDENT IN AN ORGANIZED HEALTH CARE EDUCATION/TRAINING PROGRAM

## 2024-11-02 PROCEDURE — 94761 N-INVAS EAR/PLS OXIMETRY MLT: CPT

## 2024-11-02 PROCEDURE — 83036 HEMOGLOBIN GLYCOSYLATED A1C: CPT | Performed by: NURSE PRACTITIONER

## 2024-11-02 PROCEDURE — 93010 ELECTROCARDIOGRAM REPORT: CPT | Performed by: STUDENT IN AN ORGANIZED HEALTH CARE EDUCATION/TRAINING PROGRAM

## 2024-11-02 PROCEDURE — 25010000002 HEPARIN (PORCINE) 25000-0.45 UT/250ML-% SOLUTION

## 2024-11-02 PROCEDURE — 80061 LIPID PANEL: CPT | Performed by: NURSE PRACTITIONER

## 2024-11-02 PROCEDURE — 84484 ASSAY OF TROPONIN QUANT: CPT | Performed by: NURSE PRACTITIONER

## 2024-11-02 PROCEDURE — 25010000002 HEPARIN (PORCINE) PER 1000 UNITS

## 2024-11-02 PROCEDURE — 80053 COMPREHEN METABOLIC PANEL: CPT | Performed by: NURSE PRACTITIONER

## 2024-11-02 PROCEDURE — 83735 ASSAY OF MAGNESIUM: CPT | Performed by: NURSE PRACTITIONER

## 2024-11-02 PROCEDURE — 84132 ASSAY OF SERUM POTASSIUM: CPT | Performed by: STUDENT IN AN ORGANIZED HEALTH CARE EDUCATION/TRAINING PROGRAM

## 2024-11-02 PROCEDURE — 94799 UNLISTED PULMONARY SVC/PX: CPT

## 2024-11-02 PROCEDURE — 85007 BL SMEAR W/DIFF WBC COUNT: CPT | Performed by: NURSE PRACTITIONER

## 2024-11-02 PROCEDURE — 93005 ELECTROCARDIOGRAM TRACING: CPT | Performed by: NURSE PRACTITIONER

## 2024-11-02 PROCEDURE — 25010000002 DIGOXIN PER 500 MCG: Performed by: INTERNAL MEDICINE

## 2024-11-02 PROCEDURE — 25010000002 FUROSEMIDE PER 20 MG: Performed by: INTERNAL MEDICINE

## 2024-11-02 PROCEDURE — 63710000001 PREDNISONE PER 5 MG: Performed by: NURSE PRACTITIONER

## 2024-11-02 PROCEDURE — 84439 ASSAY OF FREE THYROXINE: CPT | Performed by: STUDENT IN AN ORGANIZED HEALTH CARE EDUCATION/TRAINING PROGRAM

## 2024-11-02 PROCEDURE — 84100 ASSAY OF PHOSPHORUS: CPT | Performed by: NURSE PRACTITIONER

## 2024-11-02 PROCEDURE — 85025 COMPLETE CBC W/AUTO DIFF WBC: CPT | Performed by: NURSE PRACTITIONER

## 2024-11-02 PROCEDURE — 99222 1ST HOSP IP/OBS MODERATE 55: CPT | Performed by: INTERNAL MEDICINE

## 2024-11-02 PROCEDURE — G0378 HOSPITAL OBSERVATION PER HR: HCPCS

## 2024-11-02 PROCEDURE — 85520 HEPARIN ASSAY: CPT

## 2024-11-02 RX ORDER — BENZONATATE 100 MG/1
100 CAPSULE ORAL 3 TIMES DAILY PRN
Status: DISCONTINUED | OUTPATIENT
Start: 2024-11-02 | End: 2024-11-06 | Stop reason: HOSPADM

## 2024-11-02 RX ORDER — BISACODYL 5 MG/1
5 TABLET, DELAYED RELEASE ORAL DAILY PRN
Status: DISCONTINUED | OUTPATIENT
Start: 2024-11-02 | End: 2024-11-06 | Stop reason: HOSPADM

## 2024-11-02 RX ORDER — ONDANSETRON 4 MG/1
4 TABLET, ORALLY DISINTEGRATING ORAL EVERY 6 HOURS PRN
Status: DISCONTINUED | OUTPATIENT
Start: 2024-11-02 | End: 2024-11-06 | Stop reason: HOSPADM

## 2024-11-02 RX ORDER — FUROSEMIDE 10 MG/ML
40 INJECTION INTRAMUSCULAR; INTRAVENOUS DAILY
Status: DISCONTINUED | OUTPATIENT
Start: 2024-11-02 | End: 2024-11-06

## 2024-11-02 RX ORDER — PREDNISONE 5 MG/1
5 TABLET ORAL DAILY
Status: COMPLETED | OUTPATIENT
Start: 2024-11-02 | End: 2024-11-04

## 2024-11-02 RX ORDER — METOPROLOL SUCCINATE 50 MG/1
50 TABLET, EXTENDED RELEASE ORAL DAILY
Status: DISCONTINUED | OUTPATIENT
Start: 2024-11-03 | End: 2024-11-06 | Stop reason: HOSPADM

## 2024-11-02 RX ORDER — POTASSIUM CHLORIDE 1500 MG/1
20 TABLET, EXTENDED RELEASE ORAL ONCE
Status: COMPLETED | OUTPATIENT
Start: 2024-11-02 | End: 2024-11-02

## 2024-11-02 RX ORDER — NITROGLYCERIN 0.4 MG/1
0.4 TABLET SUBLINGUAL
Status: DISCONTINUED | OUTPATIENT
Start: 2024-11-02 | End: 2024-11-06 | Stop reason: HOSPADM

## 2024-11-02 RX ORDER — HEPARIN SODIUM 1000 [USP'U]/ML
2000 INJECTION, SOLUTION INTRAVENOUS; SUBCUTANEOUS ONCE
Status: COMPLETED | OUTPATIENT
Start: 2024-11-02 | End: 2024-11-02

## 2024-11-02 RX ORDER — SODIUM CHLORIDE 0.9 % (FLUSH) 0.9 %
10 SYRINGE (ML) INJECTION EVERY 12 HOURS SCHEDULED
Status: DISCONTINUED | OUTPATIENT
Start: 2024-11-02 | End: 2024-11-06 | Stop reason: HOSPADM

## 2024-11-02 RX ORDER — AMOXICILLIN 250 MG
2 CAPSULE ORAL 2 TIMES DAILY PRN
Status: DISCONTINUED | OUTPATIENT
Start: 2024-11-02 | End: 2024-11-06 | Stop reason: HOSPADM

## 2024-11-02 RX ORDER — ACETAMINOPHEN 650 MG/1
650 SUPPOSITORY RECTAL EVERY 4 HOURS PRN
Status: DISCONTINUED | OUTPATIENT
Start: 2024-11-02 | End: 2024-11-06 | Stop reason: HOSPADM

## 2024-11-02 RX ORDER — ACETAMINOPHEN 160 MG/5ML
650 SOLUTION ORAL EVERY 4 HOURS PRN
Status: DISCONTINUED | OUTPATIENT
Start: 2024-11-02 | End: 2024-11-06 | Stop reason: HOSPADM

## 2024-11-02 RX ORDER — ACETAMINOPHEN 325 MG/1
650 TABLET ORAL EVERY 4 HOURS PRN
Status: DISCONTINUED | OUTPATIENT
Start: 2024-11-02 | End: 2024-11-06 | Stop reason: HOSPADM

## 2024-11-02 RX ORDER — DOXYCYCLINE 100 MG/1
100 CAPSULE ORAL EVERY 12 HOURS SCHEDULED
Status: COMPLETED | OUTPATIENT
Start: 2024-11-02 | End: 2024-11-03

## 2024-11-02 RX ORDER — HEPARIN SODIUM 1000 [USP'U]/ML
4000 INJECTION, SOLUTION INTRAVENOUS; SUBCUTANEOUS ONCE
Status: COMPLETED | OUTPATIENT
Start: 2024-11-02 | End: 2024-11-02

## 2024-11-02 RX ORDER — POLYETHYLENE GLYCOL 3350 17 G/17G
17 POWDER, FOR SOLUTION ORAL DAILY PRN
Status: DISCONTINUED | OUTPATIENT
Start: 2024-11-02 | End: 2024-11-06 | Stop reason: HOSPADM

## 2024-11-02 RX ORDER — ONDANSETRON 2 MG/ML
4 INJECTION INTRAMUSCULAR; INTRAVENOUS EVERY 6 HOURS PRN
Status: DISCONTINUED | OUTPATIENT
Start: 2024-11-02 | End: 2024-11-06 | Stop reason: HOSPADM

## 2024-11-02 RX ORDER — FUROSEMIDE 40 MG/1
40 TABLET ORAL DAILY
Status: DISCONTINUED | OUTPATIENT
Start: 2024-11-02 | End: 2024-11-02

## 2024-11-02 RX ORDER — DIGOXIN 0.25 MG/ML
250 INJECTION INTRAMUSCULAR; INTRAVENOUS EVERY 4 HOURS
Status: COMPLETED | OUTPATIENT
Start: 2024-11-02 | End: 2024-11-03

## 2024-11-02 RX ORDER — BISACODYL 10 MG
10 SUPPOSITORY, RECTAL RECTAL DAILY PRN
Status: DISCONTINUED | OUTPATIENT
Start: 2024-11-02 | End: 2024-11-06 | Stop reason: HOSPADM

## 2024-11-02 RX ORDER — IPRATROPIUM BROMIDE AND ALBUTEROL SULFATE 2.5; .5 MG/3ML; MG/3ML
3 SOLUTION RESPIRATORY (INHALATION) EVERY 4 HOURS PRN
Status: DISCONTINUED | OUTPATIENT
Start: 2024-11-02 | End: 2024-11-02

## 2024-11-02 RX ORDER — SODIUM CHLORIDE 0.9 % (FLUSH) 0.9 %
10 SYRINGE (ML) INJECTION AS NEEDED
Status: DISCONTINUED | OUTPATIENT
Start: 2024-11-02 | End: 2024-11-06 | Stop reason: HOSPADM

## 2024-11-02 RX ORDER — HEPARIN SODIUM 10000 [USP'U]/100ML
16.5 INJECTION, SOLUTION INTRAVENOUS
Status: DISCONTINUED | OUTPATIENT
Start: 2024-11-02 | End: 2024-11-04

## 2024-11-02 RX ORDER — FOLIC ACID 1 MG/1
2 TABLET ORAL DAILY
Status: DISCONTINUED | OUTPATIENT
Start: 2024-11-02 | End: 2024-11-06 | Stop reason: HOSPADM

## 2024-11-02 RX ORDER — IPRATROPIUM BROMIDE AND ALBUTEROL SULFATE 2.5; .5 MG/3ML; MG/3ML
3 SOLUTION RESPIRATORY (INHALATION)
Status: DISCONTINUED | OUTPATIENT
Start: 2024-11-02 | End: 2024-11-03

## 2024-11-02 RX ORDER — METOPROLOL SUCCINATE 25 MG/1
25 TABLET, EXTENDED RELEASE ORAL DAILY
Status: DISCONTINUED | OUTPATIENT
Start: 2024-11-02 | End: 2024-11-02

## 2024-11-02 RX ORDER — FAMOTIDINE 20 MG/1
40 TABLET, FILM COATED ORAL DAILY
Status: DISCONTINUED | OUTPATIENT
Start: 2024-11-02 | End: 2024-11-06 | Stop reason: HOSPADM

## 2024-11-02 RX ADMIN — POTASSIUM CHLORIDE 20 MEQ: 1500 TABLET, EXTENDED RELEASE ORAL at 12:00

## 2024-11-02 RX ADMIN — BENZONATATE 100 MG: 100 CAPSULE ORAL at 15:50

## 2024-11-02 RX ADMIN — Medication 5 MG/HR: at 00:40

## 2024-11-02 RX ADMIN — EMPAGLIFLOZIN 10 MG: 10 TABLET, FILM COATED ORAL at 14:56

## 2024-11-02 RX ADMIN — IPRATROPIUM BROMIDE AND ALBUTEROL SULFATE 3 ML: 2.5; .5 SOLUTION RESPIRATORY (INHALATION) at 12:03

## 2024-11-02 RX ADMIN — SENNOSIDES AND DOCUSATE SODIUM 2 TABLET: 50; 8.6 TABLET ORAL at 21:28

## 2024-11-02 RX ADMIN — DIGOXIN 250 MCG: 0.25 INJECTION INTRAMUSCULAR; INTRAVENOUS at 23:52

## 2024-11-02 RX ADMIN — METOPROLOL SUCCINATE 25 MG: 25 TABLET, EXTENDED RELEASE ORAL at 09:57

## 2024-11-02 RX ADMIN — FUROSEMIDE 40 MG: 10 INJECTION, SOLUTION INTRAMUSCULAR; INTRAVENOUS at 14:56

## 2024-11-02 RX ADMIN — HEPARIN SODIUM 16 UNITS/KG/HR: 10000 INJECTION, SOLUTION INTRAVENOUS at 19:54

## 2024-11-02 RX ADMIN — BENZONATATE 100 MG: 100 CAPSULE ORAL at 23:52

## 2024-11-02 RX ADMIN — FUROSEMIDE 40 MG: 40 TABLET ORAL at 09:55

## 2024-11-02 RX ADMIN — Medication 10 ML: at 09:57

## 2024-11-02 RX ADMIN — Medication 5 MG/HR: at 15:36

## 2024-11-02 RX ADMIN — POTASSIUM CHLORIDE 20 MEQ: 1500 TABLET, EXTENDED RELEASE ORAL at 19:58

## 2024-11-02 RX ADMIN — DIGOXIN 250 MCG: 0.25 INJECTION INTRAMUSCULAR; INTRAVENOUS at 20:01

## 2024-11-02 RX ADMIN — AMOXICILLIN AND CLAVULANATE POTASSIUM 1 TABLET: 875; 125 TABLET, FILM COATED ORAL at 09:55

## 2024-11-02 RX ADMIN — LEVOTHYROXINE SODIUM 175 MCG: 25 TABLET ORAL at 09:55

## 2024-11-02 RX ADMIN — METOPROLOL TARTRATE 25 MG: 25 TABLET, FILM COATED ORAL at 00:38

## 2024-11-02 RX ADMIN — HEPARIN SODIUM 4000 UNITS: 1000 INJECTION INTRAVENOUS; SUBCUTANEOUS at 05:18

## 2024-11-02 RX ADMIN — IPRATROPIUM BROMIDE AND ALBUTEROL SULFATE 3 ML: 2.5; .5 SOLUTION RESPIRATORY (INHALATION) at 20:11

## 2024-11-02 RX ADMIN — DOXYCYCLINE 100 MG: 100 CAPSULE ORAL at 09:57

## 2024-11-02 RX ADMIN — AMOXICILLIN AND CLAVULANATE POTASSIUM 1 TABLET: 875; 125 TABLET, FILM COATED ORAL at 19:57

## 2024-11-02 RX ADMIN — HEPARIN SODIUM 2000 UNITS: 1000 INJECTION INTRAVENOUS; SUBCUTANEOUS at 15:35

## 2024-11-02 RX ADMIN — PREDNISONE 5 MG: 5 TABLET ORAL at 09:55

## 2024-11-02 RX ADMIN — DIGOXIN 250 MCG: 0.25 INJECTION INTRAMUSCULAR; INTRAVENOUS at 14:55

## 2024-11-02 RX ADMIN — METOPROLOL TARTRATE 25 MG: 25 TABLET, FILM COATED ORAL at 05:10

## 2024-11-02 RX ADMIN — DOXYCYCLINE 100 MG: 100 CAPSULE ORAL at 19:57

## 2024-11-02 RX ADMIN — FAMOTIDINE 40 MG: 20 TABLET, FILM COATED ORAL at 09:55

## 2024-11-02 RX ADMIN — FOLIC ACID 2 MG: 1 TABLET ORAL at 09:57

## 2024-11-02 NOTE — ED NOTES
Spenser Pascal    Nursing Report ED to Floor:  Mental status: A&Ox4  Ambulatory status: Independent  Oxygen Therapy:  RA  Cardiac Rhythm: new onset afib RVR  Admitted from: home/ED  Safety Concerns:  RSV +  Social Issues: none  ED Room #:  31    ED Nurse Phone Extension - [phone] or may call [phone].      HPI:   Chief Complaint   Patient presents with    Chest Pain       Past Medical History:  Past Medical History:   Diagnosis Date    Arthritis     Broken bones     thumb and toe    Cancer 2020    Squamous cell carcinoma    Colon polyp 1985    No others since    Diverticulosis 2000?    HL (hearing loss) 1995?    Hypertension ?    Hypothyroidism 2000?    Otosclerosis     Rheumatoid arthritis         Past Surgical History:  Past Surgical History:   Procedure Laterality Date    COLONOSCOPY  Oct 2023    FRACTURE SURGERY  2002    JOINT REPLACEMENT  2020    LAMINECTOMY      LIPOMA EXCISION      REPLACEMENT TOTAL KNEE      SALIVARY GLAND SURGERY      SPINE SURGERY  2023    Also in 1980 and 2011        Admitting Doctor:   Vladimir Rodarte III, DO    Consulting Provider(s):  Consults       No orders found from 10/3/2024 to 11/2/2024.             Admitting Diagnosis:   The primary encounter diagnosis was Chest pain, unspecified type. Diagnoses of RSV (acute bronchiolitis due to respiratory syncytial virus) and Atrial fibrillation, unspecified type were also pertinent to this visit.    Most Recent Vitals:   Vitals:    11/01/24 2014 11/01/24 2059 11/01/24 2150 11/01/24 2200   BP: 127/92 117/99 128/98 128/88   BP Location:       Patient Position:       Pulse: (!) 125 112 115 112   Resp:   20    Temp:       TempSrc:       SpO2: 96% 94% 97% 95%   Weight:       Height:           Active LDAs/IV Access:   Lines, Drains & Airways       Active LDAs       Name Placement date Placement time Site Days    Peripheral IV 11/01/24 1819 Left Antecubital 11/01/24 1819  Antecubital  less than 1                    Labs (abnormal labs have a  star):   Labs Reviewed   RESPIRATORY PANEL PCR W/ COVID-19 (SARS-COV-2), NP SWAB IN UTM/VTP, 2 HR TAT - Abnormal; Notable for the following components:       Result Value    RSV, PCR Detected (*)     All other components within normal limits    Narrative:     In the setting of a positive respiratory panel with a viral infection PLUS a negative procalcitonin without other underlying concern for bacterial infection, consider observing off antibiotics or discontinuation of antibiotics and continue supportive care. If the respiratory panel is positive for atypical bacterial infection (Bordetella pertussis, Chlamydophila pneumoniae, or Mycoplasma pneumoniae), consider antibiotic de-escalation to target atypical bacterial infection.   COMPREHENSIVE METABOLIC PANEL - Abnormal; Notable for the following components:    ALT (SGPT) 47 (*)     Total Bilirubin 1.5 (*)     BUN/Creatinine Ratio 25.6 (*)     All other components within normal limits    Narrative:     GFR Normal >60  Chronic Kidney Disease <60  Kidney Failure <15     BNP (IN-HOUSE) - Abnormal; Notable for the following components:    proBNP 1,569.0 (*)     All other components within normal limits    Narrative:     This assay is used as an aid in the diagnosis of individuals suspected of having heart failure. It can be used as an aid in the diagnosis of acute decompensated heart failure (ADHF) in patients presenting with signs and symptoms of ADHF to the emergency department (ED). In addition, NT-proBNP of <300 pg/mL indicates ADHF is not likely.    Age Range Result Interpretation  NT-proBNP Concentration (pg/mL:      <50             Positive            >450                   Gray                 300-450                    Negative             <300    50-75           Positive            >900                  Gray                300-900                  Negative            <300      >75             Positive            >1800                  Vazquez                 300-1800                  Negative            <300   SINGLE HS TROPONIN T - Abnormal; Notable for the following components:    HS Troponin T 22 (*)     All other components within normal limits    Narrative:     High Sensitive Troponin T Reference Range:  <14.0 ng/L- Negative Female for AMI  <22.0 ng/L- Negative Male for AMI  >=14 - Abnormal Female indicating possible myocardial injury.  >=22 - Abnormal Male indicating possible myocardial injury.   Clinicians would have to utilize clinical acumen, EKG, Troponin, and serial changes to determine if it is an Acute Myocardial Infarction or myocardial injury due to an underlying chronic condition.        CBC WITH AUTO DIFFERENTIAL - Abnormal; Notable for the following components:    Lymphocyte % 13.2 (*)     Immature Grans % 0.8 (*)     Monocytes, Absolute 1.04 (*)     Immature Grans, Absolute 0.07 (*)     All other components within normal limits   C-REACTIVE PROTEIN - Abnormal; Notable for the following components:    C-Reactive Protein 2.58 (*)     All other components within normal limits   APTT - Abnormal; Notable for the following components:    PTT 31.7 (*)     All other components within normal limits    Narrative:     PTT = The equivalent PTT values for the therapeutic range of heparin levels at 0.3 to 0.5 U/ml are 60 to 70 seconds.   PROCALCITONIN - Normal    Narrative:     As a Marker for Sepsis (Non-Neonates):    1. <0.5 ng/mL represents a low risk of severe sepsis and/or septic shock.  2. >2 ng/mL represents a high risk of severe sepsis and/or septic shock.    As a Marker for Lower Respiratory Tract Infections that require antibiotic therapy:    PCT on Admission    Antibiotic Therapy       6-12 Hrs later    >0.5                Strongly Recommended  >0.25 - <0.5        Recommended   0.1 - 0.25          Discouraged              Remeasure/reassess PCT  <0.1                Strongly Discouraged     Remeasure/reassess PCT    As 28 day mortality risk marker:  "\"Change in Procalcitonin Result\" (>80% or <=80%) if Day 0 (or Day 1) and Day 4 values are available. Refer to http://www.[facility]-pct-calculator.com    Change in PCT <=80%  A decrease of PCT levels below or equal to 80% defines a positive change in PCT test result representing a higher risk for 28-day all-cause mortality of patients diagnosed with severe sepsis for septic shock.    Change in PCT >80%  A decrease of PCT levels of more than 80% defines a negative change in PCT result representing a lower risk for 28-day all-cause mortality of patients diagnosed with severe sepsis or septic shock.      TSH RFX ON ABNORMAL TO FREE T4 - Normal   PROTIME-INR - Normal   RAINBOW DRAW    Narrative:     The following orders were created for panel order [facility] Draw.  Procedure                               Abnormality         Status                     ---------                               -----------         ------                     Green Top (Gel)[[id number]]                                  Final result               Lavender Top[[id number]]                                     Final result               Gold Top - SST[[id number]]                                   Final result               Vazquez Top[[id number]]                                         Final result               Light Blue Top[[id number]]                                   Final result                 Please view results for these tests on the individual orders.   HEPARIN ANTI XA   CBC WITH AUTO DIFFERENTIAL   CBC AND DIFFERENTIAL    Narrative:     The following orders were created for panel order CBC & Differential.  Procedure                               Abnormality         Status                     ---------                               -----------         ------                     CBC Auto Differential[[id number]]        Abnormal            Final result                 Please view results for these tests on the individual orders.   GREEN TOP   LAVENDER TOP "   GOLD TOP - SST   GRAY TOP   LIGHT BLUE TOP   CBC AND DIFFERENTIAL    Narrative:     The following orders were created for panel order CBC & Differential.  Procedure                               Abnormality         Status                     ---------                               -----------         ------                     CBC Auto Differential[[id number]]                                                         Please view results for these tests on the individual orders.       Meds Given in ED:   Medications   sodium chloride 0.9 % flush 10 mL (has no administration in time range)   sodium chloride 0.9 % flush 10 mL (has no administration in time range)   metoprolol tartrate (LOPRESSOR) tablet 25 mg (25 mg Oral Given 11/1/24 2121)   heparin 42508 units/250 mL (100 units/mL) in 0.45 % NaCl infusion (10 Units/kg/hr × 98.9 kg Intravenous New Bag 11/1/24 2110)   Pharmacy to Dose Heparin (has no administration in time range)   iopamidol (ISOVUE-370) 76 % injection 100 mL (85 mL Intravenous Given 11/1/24 1925)   metoprolol tartrate (LOPRESSOR) injection 5 mg (5 mg Intravenous Given 11/1/24 2119)   heparin (porcine) injection 4,000 Units (4,000 Units Intravenous Given 11/1/24 2109)   ipratropium-albuterol (DUO-NEB) nebulizer solution 3 mL (3 mL Nebulization Given 11/1/24 2150)     heparin, 10 Units/kg/hr, Last Rate: 10 Units/kg/hr (11/01/24 2110)  Pharmacy to Dose Heparin,          Last NIH score:                                                          Dysphagia screening results:        Selena Coma Scale:  No data recorded     CIWA:        Restraint Type:            Isolation Status:  No active isolations

## 2024-11-02 NOTE — PROGRESS NOTES
Pharmacy to Dose Heparin Infusion Note    Spenser Pascal is a 64 y.o. male receiving heparin infusion.     Therapy for (VTE/Cardiac): Cardiac  Patient Weight: 98.9 kg  Initial Bolus (Y/N): Yes  Any Bolus (Y/N): Yes     Signs or Symptoms of Bleeding: N/A    Cardiac or Other (Not VTE)   Initial Bolus: 60 units/kg (Max 4,000 units)  Initial rate: 12 units/kg/hr (Max 1,000 units/hr)   Anti-Xa Bolus   Dose Infusion Hold   Time Infusion Rate Change (units/kg/hr) Repeat  Anti-Xa    < 0.11 50 units/kg  (4000 units Max) None Increase by  3 units/kg/hr 6 hours   0.11- 0.19 25 units/kg  (2000 units Max) None Increase by  2 units/kg/hr 6 hours   0.2 - 0.29 0 None Increase by  1 units/kg/hr 6 hours   0.3 - 0.5 0 None No Change 6 hours (after 2 consecutive levels in range check qAM)   0.51 - 0.6 0 None Decrease by  1 units/kg/hr 6 hours   0.61 - 0.8 0 30 minutes Decrease by  2 units/kg/hr 6 hours   0.81 - 1 0 60 minutes Decrease by  3 units/kg/hr 6 hours   >1 0 Hold  After Anti-Xa less than 0.5 decrease previous rate by  4 units/kg/hr  Every 2 hours until Anti-Xa  less than 0.5 then when infusion restarts in 6 hours     Results from last 7 days   Lab Units 11/02/24  0358 11/01/24  1819   INR   --  1.05   HEMOGLOBIN g/dL 16.1 16.3   HEMATOCRIT % 48.1 48.5   PLATELETS 10*3/mm3 305 326       Date   Time   Anti-Xa Current Rate (units/kg/hr) Bolus   (units) Rate Change   (units/kg/hr) New Rate (units/kg/hr) Repeat  Anti-Xa Comments /  Pump Check    11/1 2127 PTT 31.7 New 4000 +10 10 0300  Dw ED RN. Pump confirmed, patient counseled    11/2 0358 0.1 10 4000 +3 13 1200 Nurse confirmed pump   11/2  1405 0.15 13 2000 +2 15 2100 D/w KASSANDRA Anglin;  Pump Checked     11/2 1600 -- 15 -- -- 16 2100 Changed wt on order to match new Epic wt of 94 kg--units/hr remain the same;   D/w KASSANDRA nAglin                                                                                                                                                                                                 Radha Rondon, PharmD  11/2/2024   15:03 EDT

## 2024-11-02 NOTE — NURSING NOTE
Patient HR elevated to 120s-180s. Provider notified and new orders received for lasix, digoxin, and restart diltiazem drip. Awaiting diltiazem drip to come from pharmacy.

## 2024-11-02 NOTE — PLAN OF CARE
Goal Outcome Evaluation:              Outcome Evaluation: Pt on RA. A-fib rates got into the 180s today see other note regarding this. Dilt drip going at 10mg/hr currently. Heparin drip going at 16 units/kg/hr currently. A &OX4. Other VSS. Pt currently in bed resting with call light and urinal in reach, no signs of distress.

## 2024-11-02 NOTE — CONSULTS
Pharmacy to Dose Heparin Infusion Note    Spenser Pascal is a 64 y.o. male receiving heparin infusion.     Therapy for (VTE/Cardiac): Cardiac  Patient Weight: 98.9 kg  Initial Bolus (Y/N): Yes  Any Bolus (Y/N): Yes     Signs or Symptoms of Bleeding: N/A    Cardiac or Other (Not VTE)   Initial Bolus: 60 units/kg (Max 4,000 units)  Initial rate: 12 units/kg/hr (Max 1,000 units/hr)   Anti-Xa Bolus   Dose Infusion Hold   Time Infusion Rate Change (units/kg/hr) Repeat  Anti-Xa    < 0.11 50 units/kg  (4000 units Max) None Increase by  3 units/kg/hr 6 hours   0.11- 0.19 25 units/kg  (2000 units Max) None Increase by  2 units/kg/hr 6 hours   0.2 - 0.29 0 None Increase by  1 units/kg/hr 6 hours   0.3 - 0.5 0 None No Change 6 hours (after 2 consecutive levels in range check qAM)   0.51 - 0.6 0 None Decrease by  1 units/kg/hr 6 hours   0.61 - 0.8 0 30 minutes Decrease by  2 units/kg/hr 6 hours   0.81 - 1 0 60 minutes Decrease by  3 units/kg/hr 6 hours   >1 0 Hold  After Anti-Xa less than 0.5 decrease previous rate by  4 units/kg/hr  Every 2 hours until Anti-Xa  less than 0.5 then when infusion restarts in 6 hours     Results from last 7 days   Lab Units 11/01/24  1819   INR  1.05   HEMOGLOBIN g/dL 16.3   HEMATOCRIT % 48.5   PLATELETS 10*3/mm3 326       Date   Time   Anti-Xa Current Rate (units/kg/hr) Bolus   (units) Rate Change   (units/kg/hr) New Rate (units/kg/hr) Repeat  Anti-Xa Comments /  Pump Check    11/1 2127 PTT 31.7 New 4000 +10 10 0300  Dw ED RN. Pump confirmed, patient counseled                 Victorino Rodarte IV, PharmD, BCPS  11/1/2024  21:27 EDT

## 2024-11-02 NOTE — CONSULTS
Date of Hospital Visit: 24  Encounter Provider: Rebeka Franz MD  Place of Service: [facility]  Patient Name: Spenser Pascal  :1960  Referral Provider: No ref. provider found  Primary Care Provider: Jaciel Alex APRN    Chief complaint: Atrial fibrillation with RVR/acute HFrEF/cardiomyopathy    History of Present Illness:  The patient is a 64-year-old male with past medical history of hypertension and rheumatoid arthritis.  States that he was in his usual state of health exercising 4-5 times a week until about a week ago.  Starting Monday he noted onset of symptoms of fatigue cough and chest congestion with progressively worsening shortness of breath.  He recalls standing and avoiding line where he felt profoundly fatigued.  He saw his PCP and was started on treatment for suspected pneumonia.  A chest x-ray was done.  Subsequently the PCP felt the patient may have congestive heart failure and started him on Lasix and ordered an echocardiogram.  Echocardiogram revealed LV dysfunction and patient was to be scheduled for outpatient cardiology appointment however his condition worsened and he contacted his PCP again with complaints of ongoing chest congestion shortness of breath and fatigue.  He was advised to go to the hospital.  Upon arrival to the ER he was noted to be in congestive heart failure with rapid atrial fibrillation and was admitted for further management.  Patient states that he did experience some chest pressure but it was not bad enough for him to complain about.  No current fever or chills.  The patient has been on treatment for rheumatoid arthritis and is felt to be immunosuppressed.  He is RSV positive and currently on antibiotics.    Past Medical History:   Diagnosis Date    Arthritis     Broken bones     thumb and toe    Cancer 2020    Squamous cell carcinoma    Colon polyp     No others since    Diverticulosis ?    HL (hearing loss) ?     Hypertension ?    Hypothyroidism 2000?    Otosclerosis     Rheumatoid arthritis        Past Surgical History:   Procedure Laterality Date    COLONOSCOPY  Oct 2023    FRACTURE SURGERY  2002    JOINT REPLACEMENT  2020    LAMINECTOMY      LIPOMA EXCISION      REPLACEMENT TOTAL KNEE      SALIVARY GLAND SURGERY      SPINE SURGERY  2023    Also in 1980 and 2011       Medications Prior to Admission   Medication Sig Dispense Refill Last Dose/Taking    amoxicillin-clavulanate (AUGMENTIN) 875-125 MG per tablet Take 1 tablet by mouth 2 (Two) Times a Day. 10 tablet 0 11/1/2024 Morning    benzonatate (Tessalon Perles) 100 MG capsule Take 1 capsule by mouth 3 (Three) Times a Day As Needed for Cough. 30 capsule 1 11/1/2024 Morning    doxycycline (VIBRAMYCIN) 100 MG capsule Take 1 capsule by mouth 2 (Two) Times a Day. 10 capsule 0 11/1/2024 Morning    folic acid (FOLVITE) 1 MG tablet Take 2 tablets by mouth Daily. 180 tablet 1 11/1/2024 Morning    furosemide (Lasix) 40 MG tablet Take 1 tablet by mouth Daily. 30 tablet 0 11/1/2024 Morning    ipratropium-albuterol (DUO-NEB) 0.5-2.5 mg/3 ml nebulizer Take 3 mL by nebulization Every 4 (Four) Hours As Needed for Wheezing or Shortness of Air. 90 mL 3 11/1/2024 Noon    levothyroxine (SYNTHROID, LEVOTHROID) 175 MCG tablet Take 1 tablet by mouth Daily. 30 tablet 3 11/1/2024 Morning    lisinopril (PRINIVIL,ZESTRIL) 10 MG tablet Take 1 tablet by mouth twice daily 180 tablet 0 11/1/2024 Morning    Methotrexate Sodium (methotrexate PF) 50 MG/2ML chemo syringe Inject 0.8 mL under the skin into the appropriate area as directed 1 (One) Time Per Week. 3.2 mL 2 Past Week    metoprolol succinate XL (TOPROL-XL) 25 MG 24 hr tablet Take 1 tablet by mouth Daily. 30 tablet 5 11/1/2024 Evening    Orencia ClickJect 125 MG/ML solution auto-injector Inject 1 mL under the skin into the appropriate area as directed 1 (One) Time Per Week. 4 mL 3 Past Week    predniSONE 5 MG (21) tablet therapy pack dose pack  "Take 1 tablet by mouth Daily. Take as directed on package instructions. 21 tablet 0 11/1/2024 Noon    vitamin D (ERGOCALCIFEROL) 1.25 MG (94625 UT) capsule capsule Take 1 capsule by mouth 1 (One) Time Per Week.   11/1/2024 Morning    BD Syringe Slip Tip 25G X 5/8\" 1 ML misc USE WITH METHOTREXATE INJECTION ONCE WEEKLY       predniSONE (DELTASONE) 5 MG tablet Take 1 tablet by mouth Daily As Needed.   Unknown       Social History     Socioeconomic History    Marital status:    Tobacco Use    Smoking status: Never    Smokeless tobacco: Never   Vaping Use    Vaping status: Never Used   Substance and Sexual Activity    Alcohol use: Yes     Alcohol/week: 6.0 standard drinks of alcohol     Types: 3 Glasses of wine, 3 Cans of beer per week    Drug use: No    Sexual activity: Not Currently     Partners: Female       Family History   Problem Relation Age of Onset    Breast cancer Mother     Cancer Mother         Breast    Hypothyroidism Father     Parkinsonism Father     Hypertension Father     Thyroid disease Father     Other (gall bladder removal) Father     Hearing loss Sister     Hearing loss Brother     Cancer Maternal Grandmother         Breast    Heart disease Maternal Grandmother     Heart disease Maternal Grandfather     Arthritis Paternal Grandmother     Hearing loss Paternal Grandmother     Hypertension Paternal Grandmother     Cancer Paternal Grandfather         Liver       REVIEW OF SYSTEMS:     12 point ROS was performed and is Negative except as outlined in HPI     Objective:     Vitals:    11/02/24 1000 11/02/24 1157 11/02/24 1203 11/02/24 1455   BP:  (!) 131/103     BP Location:  Right arm     Patient Position:  Lying     Pulse: 109 106 109 (!) 164   Resp:  18 18    Temp:  97.1 °F (36.2 °C)     TempSrc:  Oral     SpO2: 94% 91% 93%    Weight:       Height:         Body mass index is 28.11 kg/m².  Flowsheet Rows      Flowsheet Row First Filed Value   Admission Height 182.9 cm (72\") Documented at " 11/01/2024 1756   Admission Weight 98.9 kg (218 lb) Documented at 11/01/2024 1756            Physical Exam   General: No acute distress, well developed and well nourished.    Skin: Skin is warm and dry. No obvious cyanosis, erythema or pallor.   HEENT: Atraumatic, normocephalic, no conjunctival pallor, no scleral icterus.   Neck: Supple, no JVD. Normal carotid upstrokes, no bruits.    Chest:No respiratory distress. No chest wall tenderness. Breath sounds are equal, bibasilar crackles and scattered rhonchi are noted.  Cardiovascular: Normal S1 and S2, irregular/tachycardic/distant.  Pulses:Radial and pedal pulses are 2+ and symmetric.    Abdomen: Soft, nontender, normal bowel sounds.   Musculoskeletal/Extremities:  No clubbing, cyanosis or edema. No gross deformity.   Neurological: Alert and oriented to person, place, and time, no gross focal deficits.   Psychiatric: Normal mood and affect.Speech and behavior is normal.    Lab Review:                Results from last 7 days   Lab Units 11/02/24  0858   SODIUM mmol/L 139   POTASSIUM mmol/L 3.7   CHLORIDE mmol/L 101   CO2 mmol/L 26.0   BUN mg/dL 18   CREATININE mg/dL 0.73*   GLUCOSE mg/dL 90   CALCIUM mg/dL 8.7     Results from last 7 days   Lab Units 11/02/24  0358 11/02/24  0001 11/01/24  1819   HSTROP T ng/L 19 21 22*     Results from last 7 days   Lab Units 11/02/24  0358   WBC 10*3/mm3 7.52   HEMOGLOBIN g/dL 16.1   HEMATOCRIT % 48.1   PLATELETS 10*3/mm3 305     Results from last 7 days   Lab Units 11/01/24  1819   INR  1.05   APTT seconds 31.7*     Results from last 7 days   Lab Units 11/02/24  0858   MAGNESIUM mg/dL 1.9     Results from last 7 days   Lab Units 11/02/24  0858   CHOLESTEROL mg/dL 121   TRIGLYCERIDES mg/dL 64   HDL CHOL mg/dL 50   LDL CHOL mg/dL 57     @LABRCNT(bnp)@  Imaging Results (Last 24 Hours)       Procedure Component Value Units Date/Time    CT Angiogram Chest Pulmonary Embolism [[id number]] Collected: 11/01/24 1933     Updated: 11/01/24 1939     Narrative:      CT ANGIOGRAM CHEST PULMONARY EMBOLISM    Date of Exam: 11/1/2024 7:18 PM EDT    Indication: sob.    Comparison: None available.    Technique: Axial CT images were obtained of the chest after the uneventful intravenous administration of 85 cc Isovue-370 IV contrast utilizing pulmonary embolism protocol.  Reconstructed coronal and sagittal images were also obtained. Automated exposure   control and iterative construction methods were used.    Findings: The thyroid gland is within normal limits. The subglottic airway is clear. No consolidation. No pneumothorax or pleural effusion. No pulmonary embolus. No evidence of aortic dissection. Mild atheromatous disease of the coronary vessels.    Cholelithiasis without evidence of cholecystitis. Splenic capsular calcifications. Stable parenchymal thoracic vertebral body height and alignment are normal. No lytic or blastic disease. The posterior elements are intact. No rib fractures.      Impression:      No pulmonary embolus. No acute cardiopulmonary disease.      Electronically Signed: Ciro Weiner MD    11/1/2024 7:36 PM EDT    Workstation ID: [id number]    XR Chest 1 View [[id number]] Collected: 11/01/24 1851     Updated: 11/01/24 1855    Narrative:      XR CHEST 1 VW    Date of Exam: 11/1/2024 6:26 PM EDT    Indication: SOA triage protocol    Comparison: 10/28/2024    Findings: No focal consolidation. No pneumothorax or pleural effusion. Cardiomegaly. The visualized clavicles appear intact. No displaced rib fractures. The visualized upper abdomen is normal.      Impression:      Impression: No acute cardiopulmonary disease.      Electronically Signed: Ciro Weiner MD    11/1/2024 6:52 PM EDT    Workstation ID: [id number]          Results for orders placed during the hospital encounter of 10/30/24    Adult Transthoracic Echo Complete W/ Cont if Necessary Per Protocol    Interpretation Summary    Left ventricular systolic function is moderately decreased.  Calculated left ventricular EF = 39.4% Left ventricular ejection fraction appears to be 36 - 40%.    Left ventricular diastolic function was not fully assessed.    The right ventricular cavity is mildly dilated.    The left atrial cavity is mildly dilated.    The right atrial cavity is dilated.    Moderate mitral valve regurgitation is present.    Estimated right ventricular systolic pressure from tricuspid regurgitation is mildly elevated (35-45 mmHg).    No previous echocardiogram available for comparison.         EKG: atrial fibrillation, nonspecific ST-T changes         Assessment:   Atrial fibrillation with RVR, new onset  Acute HFrEF, EF 36-40% by echo 10/30/2024.  Cardiomyopathy, new diagnosis.  Chest pain/dyspnea on exertion, in the setting of afib RVR  Tropnin negative x2, ECG with no acute ST-T changes  Mild atheromatous disease of the coronary vessels on CTA chest  Pulmonary edema vs pneumonia  RSV (+), 11/1/2024  VHD/moderate MR  Hypertension  RA on chronic immunosuppressive therapy    Plan:   Restart IV diltiazem for control of ventricular rate, increase the dose of Toprol to 50 mg daily.  Give loading dose of digoxin 0.25 mg IV x 4 doses.  Continue IV heparin for anticoagulation.  Give Lasix 40 mg IV x 1 now.  Add Jardiance 10 mg daily.  Treatment of medical conditions per hospitalist.  Plan is to manage congestive heart failure and infection over the weekend, rate control and anticoagulation.  If his condition stabilizes and improves we will consider cardiac cath for workup of cardiomyopathy and subsequently proceed to KEE/ECV on Monday if he remains in atrial fibrillation.  Thank you for this consultation, we will follow.    Rebeka Franz MD, FACC, Psychiatric

## 2024-11-02 NOTE — PROGRESS NOTES
[facility] Medicine Services  PROGRESS NOTE    Patient Name: Spenser Pascal  : 1960  MRN: [id number]    Date of Admission: 2024  Primary Care Physician: Jaciel Alex APRN    Subjective   Subjective     CC:  Chest pain, shortness of breath    HPI:  Patient seen and examined this morning.  He reports he is feeling better today however still has significant cough.  He is off the dilt drip with heart rate more controlled however still in A-fib.      Objective   Objective     Vital Signs:   Temp:  [97.1 °F (36.2 °C)-98.2 °F (36.8 °C)] 97.1 °F (36.2 °C)  Heart Rate:  [] 109  Resp:  [16-20] 18  BP: (108-131)/() 131/103     Physical Exam  Constitutional:       General: He is not in acute distress.  Cardiovascular:      Rate and Rhythm: Tachycardia present. Rhythm irregular.      Heart sounds: Normal heart sounds.   Pulmonary:      Effort: Pulmonary effort is normal. No respiratory distress.   Abdominal:      General: There is no distension.      Palpations: Abdomen is soft.      Tenderness: There is no abdominal tenderness.   Musculoskeletal:      Right lower leg: No edema.      Left lower leg: No edema.   Neurological:      General: No focal deficit present.      Mental Status: He is alert.          Results Reviewed:  LAB RESULTS:      Lab 24  1405 24  0858 24  0358 24  0001 24  1819   WBC  --   --  7.52  --  9.22   HEMOGLOBIN  --   --  16.1  --  16.3   HEMATOCRIT  --   --  48.1  --  48.5   PLATELETS  --   --  305  --  326   NEUTROS ABS  --   --  5.13  --  6.69   IMMATURE GRANS (ABS)  --   --  0.07*  --  0.07*   LYMPHS ABS  --   --  1.24  --  1.22   MONOS ABS  --   --  0.82  --  1.04*   EOS ABS  --   --  0.19  --  0.13   MCV  --   --  94.5  --  93.6   CRP  --   --   --   --  2.58*   PROCALCITONIN  --   --   --   --  0.08   PROTIME  --   --   --   --  13.9   APTT  --   --   --   --  31.7*   HEPARIN ANTI-XA 0.15* 0.21* 0.10*  --    --    HSTROP T  --   --  19 21 22*         Lab 11/02/24  0858 11/01/24  1819   SODIUM 139 142   POTASSIUM 3.7 3.6   CHLORIDE 101 101   CO2 26.0 26.0   ANION GAP 12.0 15.0   BUN 18 22   CREATININE 0.73* 0.86   EGFR 100.3 95.5   GLUCOSE 90 94   CALCIUM 8.7 9.7   MAGNESIUM 1.9 1.9   PHOSPHORUS 3.8  --    HEMOGLOBIN A1C 5.50  --    TSH  --  1.270         Lab 11/02/24  0858 11/01/24  1819   TOTAL PROTEIN 6.2 7.1   ALBUMIN 4.0 4.3   GLOBULIN 2.2 2.8   ALT (SGPT) 38 47*   AST (SGOT) 25 32   BILIRUBIN 1.6* 1.5*   ALK PHOS 84 91         Lab 11/02/24  0358 11/02/24  0001 11/01/24  1819 10/28/24  1532   PROBNP  --   --  1,569.0* 2,002.0*   HSTROP T 19 21 22*  --    PROTIME  --   --  13.9  --    INR  --   --  1.05  --          Lab 11/02/24  0858   CHOLESTEROL 121   LDL CHOL 57   HDL CHOL 50   TRIGLYCERIDES 64             Brief Urine Lab Results       None            Microbiology Results Abnormal       Procedure Component Value - Date/Time    Respiratory Panel PCR w/COVID-19(SARS-CoV-2) VIVIAN/PIETER/URIEL/PAD/COR/TIFFANIE In-House, NP Swab in UTM/VTM, 2 HR TAT - Swab, Nasopharynx [[id number]]  (Abnormal) Collected: 11/01/24 1913    Lab Status: Final result Specimen: Swab from Nasopharynx Updated: 11/01/24 2030     ADENOVIRUS, PCR Not Detected     Coronavirus 229E Not Detected     Coronavirus HKU1 Not Detected     Coronavirus NL63 Not Detected     Coronavirus OC43 Not Detected     COVID19 Not Detected     Human Metapneumovirus Not Detected     Human Rhinovirus/Enterovirus Not Detected     Influenza A PCR Not Detected     Influenza B PCR Not Detected     Parainfluenza Virus 1 Not Detected     Parainfluenza Virus 2 Not Detected     Parainfluenza Virus 3 Not Detected     Parainfluenza Virus 4 Not Detected     RSV, PCR Detected     Bordetella pertussis pcr Not Detected     Bordetella parapertussis PCR Not Detected     Chlamydophila pneumoniae PCR Not Detected     Mycoplasma pneumo by PCR Not Detected    Narrative:      In the setting of a  positive respiratory panel with a viral infection PLUS a negative procalcitonin without other underlying concern for bacterial infection, consider observing off antibiotics or discontinuation of antibiotics and continue supportive care. If the respiratory panel is positive for atypical bacterial infection (Bordetella pertussis, Chlamydophila pneumoniae, or Mycoplasma pneumoniae), consider antibiotic de-escalation to target atypical bacterial infection.            CT Angiogram Chest Pulmonary Embolism    Result Date: 11/1/2024  CT ANGIOGRAM CHEST PULMONARY EMBOLISM Date of Exam: 11/1/2024 7:18 PM EDT Indication: sob. Comparison: None available. Technique: Axial CT images were obtained of the chest after the uneventful intravenous administration of 85 cc Isovue-370 IV contrast utilizing pulmonary embolism protocol.  Reconstructed coronal and sagittal images were also obtained. Automated exposure  control and iterative construction methods were used. Findings: The thyroid gland is within normal limits. The subglottic airway is clear. No consolidation. No pneumothorax or pleural effusion. No pulmonary embolus. No evidence of aortic dissection. Mild atheromatous disease of the coronary vessels. Cholelithiasis without evidence of cholecystitis. Splenic capsular calcifications. Stable parenchymal thoracic vertebral body height and alignment are normal. No lytic or blastic disease. The posterior elements are intact. No rib fractures.     Impression: No pulmonary embolus. No acute cardiopulmonary disease. Electronically Signed: Ciro Weiner MD  11/1/2024 7:36 PM EDT  Workstation ID: [id number]    XR Chest 1 View    Result Date: 11/1/2024  XR CHEST 1 VW Date of Exam: 11/1/2024 6:26 PM EDT Indication: SOA triage protocol Comparison: 10/28/2024 Findings: No focal consolidation. No pneumothorax or pleural effusion. Cardiomegaly. The visualized clavicles appear intact. No displaced rib fractures. The visualized upper abdomen is  normal.     Impression: Impression: No acute cardiopulmonary disease. Electronically Signed: Ciro Weiner MD  11/1/2024 6:52 PM EDT  Workstation ID: [id number]     Results for orders placed during the hospital encounter of 10/30/24    Adult Transthoracic Echo Complete W/ Cont if Necessary Per Protocol    Interpretation Summary    Left ventricular systolic function is moderately decreased. Calculated left ventricular EF = 39.4% Left ventricular ejection fraction appears to be 36 - 40%.    Left ventricular diastolic function was not fully assessed.    The right ventricular cavity is mildly dilated.    The left atrial cavity is mildly dilated.    The right atrial cavity is dilated.    Moderate mitral valve regurgitation is present.    Estimated right ventricular systolic pressure from tricuspid regurgitation is mildly elevated (35-45 mmHg).    No previous echocardiogram available for comparison.      Current medications:  Scheduled Meds:amoxicillin-clavulanate, 1 tablet, Oral, BID  digoxin, 250 mcg, Intravenous, Q4H  doxycycline, 100 mg, Oral, Q12H  empagliflozin, 10 mg, Oral, Daily  famotidine, 40 mg, Oral, Daily  folic acid, 2 mg, Oral, Daily  furosemide, 40 mg, Intravenous, Daily  ipratropium-albuterol, 3 mL, Nebulization, Q6H While Awake - RT  levothyroxine, 175 mcg, Oral, Q AM  [START ON 11/3/2024] metoprolol succinate XL, 50 mg, Oral, Daily  pharmacy consult - [place], , Does not apply, Daily  predniSONE, 5 mg, Oral, Daily  sodium chloride, 10 mL, Intravenous, Q12H      Continuous Infusions:dilTIAZem, 5-15 mg/hr, Last Rate: Stopped (11/02/24 0832)  heparin, 13 Units/kg/hr, Last Rate: 13 Units/kg/hr (11/02/24 1440)  Pharmacy to Dose Heparin,       PRN Meds:.  acetaminophen **OR** acetaminophen **OR** acetaminophen    benzonatate    senna-docusate sodium **AND** polyethylene glycol **AND** bisacodyl **AND** bisacodyl    Calcium Replacement - Follow Nurse / BPA Driven Protocol    Magnesium Standard Dose Replacement -  Follow Nurse / BPA Driven Protocol    melatonin    nitroglycerin    ondansetron ODT **OR** ondansetron    Pharmacy to Dose Heparin    Phosphorus Replacement - Follow Nurse / BPA Driven Protocol    Potassium Replacement - Follow Nurse / BPA Driven Protocol    sodium chloride    [COMPLETED] Insert Peripheral IV **AND** sodium chloride    sodium chloride    Assessment & Plan   Assessment & Plan     Active Hospital Problems    Diagnosis  POA    **Atrial fibrillation with RVR [I48.91]  Yes    HFrEF (heart failure with reduced ejection fraction) [I50.20]  Yes    Immunosuppression due to drug therapy [D84.821, Z79.899]  Not Applicable    Rheumatoid arthritis of multiple sites with negative rheumatoid factor [M06.09]  Yes    Benign essential HTN [I10]  Yes    Acquired hypothyroidism [E03.9]  Yes      Resolved Hospital Problems   No resolved problems to display.        Brief Hospital Course to date:  Spenser Pascal is a 64 y.o. male with RA on methotrexate/DMARD, hypertension, hypothyroidism, recently diagnosed cardiomegaly and HFrEF who presented to the ED for chest pain and shortness of breath.  Patient found to be in A-fib with RVR and was found to be RSV positive.    A-fib RVR  - Recently started on p.o. metoprolol 25 mg daily as an outpatient  - Cardiology evaluated patient, metoprolol was increased to 50 mg daily and patient was started on digoxin 0.25 mg IV for 4 doses loading dose  - Cardene drip continued  - Continue heparin drip    HFrEF  Cardiomyopathy  - Recently diagnosed on echo ordered by his PCP on 10/30 due to a chest x-ray that showed pulmonary vascular congestion, interstitial edema and cardiomegaly  - Patient had appointment to see cardiology as an outpatient  - Echo on 10/30 showed EF of 36 to 40%  - No lower extremity edema or orthopnea at this time  - Status post 40 mg IV Lasix this morning  - Cardiology consulted as above, started patient on Jardiance  - Considering cardiac cath for workup of his  cardiomyopathy along with KEE/ECV on Monday if patient's condition stabilizes and he is still in A-fib    Shortness of air  Concern for pneumonia  RSV positive  -Shortness of air could be multifactorial in the setting of heart failure as above and RSV infection with possible bacterial pneumonia  - Concern for pneumonia as an outpatient, was being treated with steroids, Augmentin and doxycycline, will finish out his course  - Patient RSV positive on 11/1   - Most recent chest x-ray shows no signs of acute cardiopulmonary disease  - Scheduled DuoNebs  -Continue supportive management    RA on chronic immunosuppressive therapy  - On methotrexate and Orencia weekly injections follows with rheumatology as an outpatient  - Currently on hold    Hypertension  - Lisinopril held in the setting of A-fib on Dilt drip, dig and increase metoprolol plus Lasix  - Restart when appropriate    Hypothyroidism  - TSH within normal limits, T4 pending  - Continue levothyroxine    JAZMIN  - Does not use CPAP      Expected Discharge Location and Transportation: TBD  Expected Discharge   Expected Discharge Date: 11/3/2024; Expected Discharge Time:      VTE Prophylaxis:  Pharmacologic VTE prophylaxis orders are present.         AM-PAC 6 Clicks Score (PT): 24 (11/02/24 0800)    CODE STATUS:   Code Status and Medical Interventions: CPR (Attempt to Resuscitate); Full Support   Ordered at: 11/01/24 6031     Code Status (Patient has no pulse and is not breathing):    CPR (Attempt to Resuscitate)     Medical Interventions (Patient has pulse or is breathing):    Full Support       Emelina Liu MD  11/02/24

## 2024-11-02 NOTE — PLAN OF CARE
Problem: Adult Inpatient Plan of Care  Goal: Plan of Care Review  Outcome: Progressing  Goal: Patient-Specific Goal (Individualized)  Outcome: Progressing  Goal: Absence of Hospital-Acquired Illness or Injury  Outcome: Progressing  Intervention: Identify and Manage Fall Risk  Recent Flowsheet Documentation  Taken 11/2/2024 0425 by Jaquan Meza RN  Safety Promotion/Fall Prevention:   safety round/check completed   clutter free environment maintained   assistive device/personal items within reach   nonskid shoes/slippers when out of bed  Taken 11/2/2024 0200 by Jaquan Meza RN  Safety Promotion/Fall Prevention:   safety round/check completed   assistive device/personal items within reach   clutter free environment maintained   nonskid shoes/slippers when out of bed  Taken 11/1/2024 2300 by Jaquan Meza RN  Safety Promotion/Fall Prevention:   safety round/check completed   nonskid shoes/slippers when out of bed   lighting adjusted   fall prevention program maintained   clutter free environment maintained   assistive device/personal items within reach  Intervention: Prevent Skin Injury  Recent Flowsheet Documentation  Taken 11/2/2024 0425 by Jaquan Meza RN  Body Position: position changed independently  Taken 11/2/2024 0200 by Jaquan Meza RN  Body Position: position changed independently  Taken 11/1/2024 2300 by Jaquan Meza RN  Body Position: position changed independently  Intervention: Prevent and Manage VTE (Venous Thromboembolism) Risk  Recent Flowsheet Documentation  Taken 11/1/2024 2300 by Jaquan Meza RN  VTE Prevention/Management: (see MAR) other (see comments)  Intervention: Prevent Infection  Recent Flowsheet Documentation  Taken 11/2/2024 0425 by Jaquan Meza RN  Infection Prevention: rest/sleep promoted  Taken 11/2/2024 0200 by Jaquan Meza RN  Infection Prevention:   rest/sleep promoted   single patient room provided  Taken 11/1/2024 2300 by Jaquan Meza RN  Infection Prevention:    hand hygiene promoted   rest/sleep promoted   single patient room provided  Goal: Optimal Comfort and Wellbeing  Outcome: Progressing  Intervention: Provide Person-Centered Care  Recent Flowsheet Documentation  Taken 11/1/2024 2300 by Jaquan Meza, RN  Trust Relationship/Rapport:   care explained   emotional support provided   choices provided   empathic listening provided   questions encouraged   thoughts/feelings acknowledged  Goal: Readiness for Transition of Care  Outcome: Progressing  Intervention: Mutually Develop Transition Plan  Recent Flowsheet Documentation  Taken 11/1/2024 2314 by Jaquan Meza, RN  Transportation Anticipated: family or friend will provide  Patient/Family Anticipated Services at Transition: none  Patient/Family Anticipates Transition to: home with family  Taken 11/1/2024 2309 by Jaquan Meza, RN  Equipment Currently Used at Home:   pulse ox   bp cuff   walker, standard   Goal Outcome Evaluation:

## 2024-11-02 NOTE — PLAN OF CARE
Problem: Adult Inpatient Plan of Care  Goal: Plan of Care Review  Outcome: Progressing  Goal: Patient-Specific Goal (Individualized)  Outcome: Progressing  Goal: Absence of Hospital-Acquired Illness or Injury  Outcome: Progressing  Intervention: Identify and Manage Fall Risk  Recent Flowsheet Documentation  Taken 11/1/2024 2300 by Jaquan Meza RN  Safety Promotion/Fall Prevention:   safety round/check completed   nonskid shoes/slippers when out of bed   lighting adjusted   fall prevention program maintained   clutter free environment maintained   assistive device/personal items within reach  Intervention: Prevent Skin Injury  Recent Flowsheet Documentation  Taken 11/1/2024 2300 by Jaquan Meza RN  Body Position: position changed independently  Intervention: Prevent and Manage VTE (Venous Thromboembolism) Risk  Recent Flowsheet Documentation  Taken 11/1/2024 2300 by Jaquan Meza RN  VTE Prevention/Management: (see MAR) other (see comments)  Intervention: Prevent Infection  Recent Flowsheet Documentation  Taken 11/1/2024 2300 by Jaquan Meza RN  Infection Prevention:   hand hygiene promoted   rest/sleep promoted   single patient room provided  Goal: Optimal Comfort and Wellbeing  Outcome: Progressing  Intervention: Provide Person-Centered Care  Recent Flowsheet Documentation  Taken 11/1/2024 2300 by Jaquan Meza RN  Trust Relationship/Rapport:   care explained   emotional support provided   choices provided   empathic listening provided   questions encouraged   thoughts/feelings acknowledged  Goal: Readiness for Transition of Care  Outcome: Progressing  Intervention: Mutually Develop Transition Plan  Recent Flowsheet Documentation  Taken 11/1/2024 2314 by Jaquan Meza RN  Transportation Anticipated: family or friend will provide  Patient/Family Anticipated Services at Transition: none  Patient/Family Anticipates Transition to: home with family  Taken 11/1/2024 2309 by Jaquan Meza RN  Equipment  Currently Used at Home:   pulse ox   bp cuff   walker, standard   Goal Outcome Evaluation:

## 2024-11-02 NOTE — H&P
[facility] Medicine Services  HISTORY AND PHYSICAL    Patient Name: Spenser Pascal  : 1960  MRN: [id number]  Primary Care Physician: Jaciel Alex APRN  Date of admission: 2024    Subjective   Subjective     Chief Complaint:  Chest pain, shortness of breath    HPI:  Spenser Pascal is a 64 y.o. male with PMHx of RA on MTX / DMARD tx, HTN, hypothyroidism, cardiomyopathy, HFrEF, JAZMIN who presents to the ED for evaluation of chest pain and shortness of breath. Had episode yesterday when standing in line to vote with sudden onset diaphoresis, chest pressure and shortness of breath; the chest pain improved but shortness of breath has been persistent and worse with exertion. Tells me his cough and shortness of breath initially started last Wed/thur with wheezing; seen by PCP and prescribed augmentin and doxycycline for possible pneumonia. No fever or chills. Wheezing has improved, continues to have harsh congested cough.    Outpatient chest xray from 10/28 w/ cardiomegaly and new mild to moderate pulmonary vascular congestion w/ minimal new right pleural effusion; diffuse perihilar disease pattern likely pulmonary interstitial edema vs pneumonia. Started on lasix and ECHO ordered by PCP d/t concern for cardiomyopathy and has upcoming consultation with Dr. Kendrick, cardiology. ECHO from 10/30 w/ EF 36-40%. He denies lower extremity edema, he does have orthopnea.     CTA chest tonight negative for PE, no acute cardiopulmonary disease.  Pertinent labs: BNP 1569.0, HS trop 22; total bili 1.5; crp 2.58, procal 0.08. respiratory PCR + RSV. EKG showing Afib RVR w/ 's, he has been given IV lopressor and HR now 110-120's. He denies palpitations, syncope or dizziness. No prior history of afib. Thinks he had a stress test in [place] ~ 10-15 years ago that was normal. He will be admitted to the hospital medicine service for further evaluation and treatment.    Review  of Systems   Constitutional:  Positive for activity change, diaphoresis and fatigue. Negative for chills and fever.   HENT:  Positive for congestion.    Respiratory:  Positive for cough, chest tightness, shortness of breath and wheezing.    Cardiovascular:  Positive for chest pain. Negative for palpitations and leg swelling.   Neurological:  Negative for dizziness and syncope.   All other systems reviewed and are negative.           Personal History     Past Medical History:   Diagnosis Date    Arthritis     Broken bones     thumb and toe    Cancer 2020    Squamous cell carcinoma    Colon polyp 1985    No others since    Diverticulosis 2000?    HL (hearing loss) 1995?    Hypertension ?    Hypothyroidism 2000?    Otosclerosis     Rheumatoid arthritis              Past Surgical History:   Procedure Laterality Date    COLONOSCOPY  Oct 2023    FRACTURE SURGERY  2002    JOINT REPLACEMENT  2020    LAMINECTOMY      LIPOMA EXCISION      REPLACEMENT TOTAL KNEE      SALIVARY GLAND SURGERY      SPINE SURGERY  2023    Also in 1980 and 2011       Family History:   family history includes Arthritis in his paternal grandmother; Breast cancer in his mother; Cancer in his maternal grandmother, mother, and paternal grandfather; Hearing loss in his brother, paternal grandmother, and sister; Heart disease in his maternal grandfather and maternal grandmother; Hypertension in his father and paternal grandmother; Hypothyroidism in his father; Parkinsonism in his father; Thyroid disease in his father; gall bladder removal in his father.     Social History:  reports that he has never smoked. He has never used smokeless tobacco. He reports current alcohol use of about 6.0 standard drinks of alcohol per week. He reports that he does not use drugs.  Social History     Social History Narrative    Not on file       Medications:  Abatacept, Tuberculin Syringe, amoxicillin-clavulanate, benzonatate, doxycycline, folic acid, furosemide,  ipratropium-albuterol, levothyroxine, lisinopril, methotrexate PF, metoprolol succinate XL, predniSONE, and vitamin D    No Known Allergies    Objective   Objective     Vital Signs:   Temp:  [97.6 °F (36.4 °C)-97.8 °F (36.6 °C)] 97.8 °F (36.6 °C)  Heart Rate:  [106-152] 106  Resp:  [18-20] 20  BP: (117-131)/() 124/101    Physical Exam  Constitutional:       General: He is not in acute distress.     Appearance: He is well-developed.   HENT:      Head: Normocephalic and atraumatic.      Nose: Nose normal.      Mouth/Throat:      Pharynx: Oropharynx is clear.   Eyes:      Extraocular Movements: Extraocular movements intact.      Conjunctiva/sclera: Conjunctivae normal.      Pupils: Pupils are equal, round, and reactive to light.   Cardiovascular:      Rate and Rhythm: Tachycardia present. Rhythm irregular.      Pulses: Normal pulses.      Heart sounds: Normal heart sounds.   Pulmonary:      Effort: Pulmonary effort is normal. Respiratory distress (dyspnea w/ exertion; coughing with talking) present.      Breath sounds: Normal breath sounds. No wheezing.   Abdominal:      General: Bowel sounds are normal. There is no distension.      Palpations: Abdomen is soft.      Tenderness: There is no abdominal tenderness.      Comments: Obese round abdomen     Musculoskeletal:         General: No swelling. Normal range of motion.      Cervical back: Normal range of motion and neck supple.   Skin:     General: Skin is warm and dry.      Capillary Refill: Capillary refill takes less than 2 seconds.   Neurological:      Mental Status: He is alert and oriented to person, place, and time.      Cranial Nerves: No cranial nerve deficit.   Psychiatric:         Mood and Affect: Mood normal.         Behavior: Behavior normal.             Result Review:  I have personally reviewed the results from the time of this admission to 11/1/2024 23:44 EDT and agree with these findings:  [x]  Laboratory list / accordion  [x]  Microbiology  [x]   Radiology  [x]  EKG/Telemetry   []  Cardiology/Vascular   []  Pathology  []  Old records  []  Other:  Most notable findings include:      LAB RESULTS:      Lab 11/01/24 1819   WBC 9.22   HEMOGLOBIN 16.3   HEMATOCRIT 48.5   PLATELETS 326   NEUTROS ABS 6.69   IMMATURE GRANS (ABS) 0.07*   LYMPHS ABS 1.22   MONOS ABS 1.04*   EOS ABS 0.13   MCV 93.6   CRP 2.58*   PROCALCITONIN 0.08   PROTIME 13.9   APTT 31.7*         Lab 11/01/24 1819   SODIUM 142   POTASSIUM 3.6   CHLORIDE 101   CO2 26.0   ANION GAP 15.0   BUN 22   CREATININE 0.86   EGFR 95.5   GLUCOSE 94   CALCIUM 9.7   MAGNESIUM 1.9   TSH 1.270         Lab 11/01/24 1819   TOTAL PROTEIN 7.1   ALBUMIN 4.3   GLOBULIN 2.8   ALT (SGPT) 47*   AST (SGOT) 32   BILIRUBIN 1.5*   ALK PHOS 91         Lab 11/01/24  1819 10/28/24  1532   PROBNP 1,569.0* 2,002.0*   HSTROP T 22*  --    PROTIME 13.9  --    INR 1.05  --                  Brief Urine Lab Results       None          Microbiology Results (last 10 days)       Procedure Component Value - Date/Time    Respiratory Panel PCR w/COVID-19(SARS-CoV-2) VIVIAN/PIETER/URIEL/PAD/COR/TIFFANIE In-House, NP Swab in UTM/VTM, 2 HR TAT - Swab, Nasopharynx [[id number]]  (Abnormal) Collected: 11/01/24 1913    Lab Status: Final result Specimen: Swab from Nasopharynx Updated: 11/01/24 2030     ADENOVIRUS, PCR Not Detected     Coronavirus 229E Not Detected     Coronavirus HKU1 Not Detected     Coronavirus NL63 Not Detected     Coronavirus OC43 Not Detected     COVID19 Not Detected     Human Metapneumovirus Not Detected     Human Rhinovirus/Enterovirus Not Detected     Influenza A PCR Not Detected     Influenza B PCR Not Detected     Parainfluenza Virus 1 Not Detected     Parainfluenza Virus 2 Not Detected     Parainfluenza Virus 3 Not Detected     Parainfluenza Virus 4 Not Detected     RSV, PCR Detected     Bordetella pertussis pcr Not Detected     Bordetella parapertussis PCR Not Detected     Chlamydophila pneumoniae PCR Not Detected     Mycoplasma pneumo  by PCR Not Detected    Narrative:      In the setting of a positive respiratory panel with a viral infection PLUS a negative procalcitonin without other underlying concern for bacterial infection, consider observing off antibiotics or discontinuation of antibiotics and continue supportive care. If the respiratory panel is positive for atypical bacterial infection (Bordetella pertussis, Chlamydophila pneumoniae, or Mycoplasma pneumoniae), consider antibiotic de-escalation to target atypical bacterial infection.            CT Angiogram Chest Pulmonary Embolism    Result Date: 11/1/2024  CT ANGIOGRAM CHEST PULMONARY EMBOLISM Date of Exam: 11/1/2024 7:18 PM EDT Indication: sob. Comparison: None available. Technique: Axial CT images were obtained of the chest after the uneventful intravenous administration of 85 cc Isovue-370 IV contrast utilizing pulmonary embolism protocol.  Reconstructed coronal and sagittal images were also obtained. Automated exposure  control and iterative construction methods were used. Findings: The thyroid gland is within normal limits. The subglottic airway is clear. No consolidation. No pneumothorax or pleural effusion. No pulmonary embolus. No evidence of aortic dissection. Mild atheromatous disease of the coronary vessels. Cholelithiasis without evidence of cholecystitis. Splenic capsular calcifications. Stable parenchymal thoracic vertebral body height and alignment are normal. No lytic or blastic disease. The posterior elements are intact. No rib fractures.     Impression: No pulmonary embolus. No acute cardiopulmonary disease. Electronically Signed: Ciro Weiner MD  11/1/2024 7:36 PM EDT  Workstation ID: [id number]    XR Chest 1 View    Result Date: 11/1/2024  XR CHEST 1 VW Date of Exam: 11/1/2024 6:26 PM EDT Indication: SOA triage protocol Comparison: 10/28/2024 Findings: No focal consolidation. No pneumothorax or pleural effusion. Cardiomegaly. The visualized clavicles appear intact. No  displaced rib fractures. The visualized upper abdomen is normal.     Impression: Impression: No acute cardiopulmonary disease. Electronically Signed: Ciro Weiner MD  11/1/2024 6:52 PM EDT  Workstation ID: [id number]     Results for orders placed during the hospital encounter of 10/30/24    Adult Transthoracic Echo Complete W/ Cont if Necessary Per Protocol    Interpretation Summary    Left ventricular systolic function is moderately decreased. Calculated left ventricular EF = 39.4% Left ventricular ejection fraction appears to be 36 - 40%.    Left ventricular diastolic function was not fully assessed.    The right ventricular cavity is mildly dilated.    The left atrial cavity is mildly dilated.    The right atrial cavity is dilated.    Moderate mitral valve regurgitation is present.    Estimated right ventricular systolic pressure from tricuspid regurgitation is mildly elevated (35-45 mmHg).    No previous echocardiogram available for comparison.      Assessment & Plan   Assessment & Plan       Atrial fibrillation with RVR    Acquired hypothyroidism    Benign essential HTN    Rheumatoid arthritis of multiple sites with negative rheumatoid factor    Immunosuppression due to drug therapy    HFrEF (heart failure with reduced ejection fraction)    Afib RVR  - heparin drip  - s/p lopressor  - started PO metoprolol 25 mg daily (10/30 outpatient), continue   - add cardizem drip  - cardiology consult in am, Dr. Spicer  - EKG in am  - NPO after MN    HFrEF / cardiomyopathy  Pulmonary edema vs pneumonia (10/28 xray)  RSV + (11/1/2024)  - continue augmentin / doxycycline x 4 days  - continue PO steroid pack x 3 days  - continue PO lasix (started 10/30 by PCP), was taking BID, will switch back to daily 40 mg  - improved chest imaging w/ diuresis / abx  - symptom management w/ RSV    RA / chronic immunosuppressive tx  - on methotrexate / Orencia weekly injections (Saturdays), follows w/ rheumatology  - hold while in  hospital    HTN  - on lisinopril, hold and monitor for hypotension w/ afib, cardizem drip  - started metoprolol 10/30     Hypothyroidism  - check tsh/t4  - continue levothyroxine    Hx JAZMIN / obesity  - does not wear cpap/bipap    DVT prophylaxis:  heparin drip    CODE STATUS:     Code Status (Patient has no pulse and is not breathing): CPR (Attempt to Resuscitate)  Medical Interventions (Patient has pulse or is breathing): Full Support      Expected Discharge     Expected Discharge Date: 11/3/2024; Expected Discharge Time:       This note has been completed as part of a split-shared workflow.     Signature: Electronically signed by MOON Ponce, 11/01/24, 11:44 PM EDT    Total APC time: 60 minutes      Attending   Admission Attestation       I have performed an independent face-to-face diagnostic evaluation including performing an independent physical examination.  I approve of the documented plan of care above that was reviewed and developed with the advanced practice clinician (APC) and take responsibility for that plan along with its associated risks.  I have updated the HPI as appropriate.    Brief HPI    64M states that yesterday (Thursday 10/31) he was standing in line to vote and had rather abrupt onset of chest pressure.  This was accompanied by mild shortness of breath and diaphoresis.  He states the chest pressure resolved after just a few moments, but the dyspnea has persisted.  He states he still has it at rest, at times, but mostly notices it with any exertion.  He also mentions he has noticed increased frequency of cough for approximately 1 week.  He saw his PCP who prescribed 2 antibiotics for an apparent pneumonia, but he feels like this has barely improved his symptoms.  As part of the workup for the pneumonia, he had a chest x-ray on 10/28 which showed cardiomegaly and apparent pulmonary edema, possibly pneumonia.  His PCP prescribed Lasix and ordered an echocardiogram which was done on  10/30.  This showed a ejection fraction of 36-40%.  On arrival in the ED, he was found to be in atrial fibrillation with RVR, which would be a new diagnosis for him.  He was given a dose of IV Lopressor and started on oral beta-blocker.    Attending Physical Exam:  Temp:  [97.6 °F (36.4 °C)-97.8 °F (36.6 °C)] 97.8 °F (36.6 °C)  Heart Rate:  [] 103  Resp:  [16-20] 16  BP: (108-131)/() 108/94    Constitutional: Awake, alert, NAD, pleasant.  Eyes: PERRLA, sclerae anicteric, no conjunctival injection  HENT: NCAT, mucous membranes moist  Neck: Supple, no thyromegaly, no lymphadenopathy, trachea midline  Respiratory: Clear to auscultation bilaterally though he does have frequent cough during conversation and with deep respiration, nonlabored respirations   Cardiovascular: Irregularly irregular, tachycardic, no murmurs, rubs, or gallops, palpable pedal pulses bilaterally  Gastrointestinal: Positive bowel sounds, soft, nontender, nondistended  Musculoskeletal: No bilateral ankle edema, no clubbing or cyanosis to extremities  Psychiatric: Appropriate affect, cooperative  Neurologic: Oriented x 3, strength symmetric in all extremities, Cranial Nerves grossly intact to confrontation, speech clear  Skin: No rashes, normal turgor.    Result Review:  I have personally reviewed the results from the time of this admission to 11/2/2024 02:49 EDT and agree with these findings:  [x]  Laboratory list / accordion  []  Microbiology  [x]  Radiology  [x]  EKG/Telemetry   []  Cardiology/Vascular   []  Pathology  []  Old records  []  Other:  Most notable findings include: I reviewed radiology report from CTA chest.  Reviewed chest x-ray which by my read shows cardiomegaly but nothing acute on this single AP view.  I reviewed EKG which by my read shows atrial fibrillation with some P waves occurring before certain beats, ventricular rate approximately 150 bpm, normal axis, nonspecific ST/T wave changes.    Assessment and  Plan:    See assessment and plan documented by APC above and updated/edited by me as appropriate.      Total time spent: 46 minutes  Time spent includes time reviewing chart, face-to-face time, counseling patient/family/caregiver, ordering medications/tests/procedures, communicating with other health care professionals, documenting clinical information in the electronic health record, and coordination of care.       Vladimir Rodarte III, DO  11/02/24

## 2024-11-03 LAB
ANION GAP SERPL CALCULATED.3IONS-SCNC: 11 MMOL/L (ref 5–15)
BUN SERPL-MCNC: 21 MG/DL (ref 8–23)
BUN/CREAT SERPL: 21.4 (ref 7–25)
CALCIUM SPEC-SCNC: 9.2 MG/DL (ref 8.6–10.5)
CHLORIDE SERPL-SCNC: 102 MMOL/L (ref 98–107)
CO2 SERPL-SCNC: 27 MMOL/L (ref 22–29)
CREAT SERPL-MCNC: 0.98 MG/DL (ref 0.76–1.27)
DEPRECATED RDW RBC AUTO: 49.4 FL (ref 37–54)
EGFRCR SERPLBLD CKD-EPI 2021: 85 ML/MIN/1.73
ERYTHROCYTE [DISTWIDTH] IN BLOOD BY AUTOMATED COUNT: 14.4 % (ref 12.3–15.4)
GLUCOSE SERPL-MCNC: 95 MG/DL (ref 65–99)
HCT VFR BLD AUTO: 49.9 % (ref 37.5–51)
HGB BLD-MCNC: 16.9 G/DL (ref 13–17.7)
MAGNESIUM SERPL-MCNC: 2.2 MG/DL (ref 1.6–2.4)
MCH RBC QN AUTO: 31.5 PG (ref 26.6–33)
MCHC RBC AUTO-ENTMCNC: 33.9 G/DL (ref 31.5–35.7)
MCV RBC AUTO: 93.1 FL (ref 79–97)
PLATELET # BLD AUTO: 327 10*3/MM3 (ref 140–450)
PMV BLD AUTO: 9.5 FL (ref 6–12)
POTASSIUM SERPL-SCNC: 4.2 MMOL/L (ref 3.5–5.2)
QT INTERVAL: 394 MS
QTC INTERVAL: 508 MS
RBC # BLD AUTO: 5.36 10*6/MM3 (ref 4.14–5.8)
SODIUM SERPL-SCNC: 140 MMOL/L (ref 136–145)
UFH PPP CHRO-ACNC: 0.2 IU/ML (ref 0.3–0.7)
UFH PPP CHRO-ACNC: 0.39 IU/ML (ref 0.3–0.7)
UFH PPP CHRO-ACNC: 0.51 IU/ML (ref 0.3–0.7)
WBC NRBC COR # BLD AUTO: 6.29 10*3/MM3 (ref 3.4–10.8)

## 2024-11-03 PROCEDURE — 85027 COMPLETE CBC AUTOMATED: CPT | Performed by: STUDENT IN AN ORGANIZED HEALTH CARE EDUCATION/TRAINING PROGRAM

## 2024-11-03 PROCEDURE — 25010000002 FUROSEMIDE PER 20 MG: Performed by: INTERNAL MEDICINE

## 2024-11-03 PROCEDURE — 99233 SBSQ HOSP IP/OBS HIGH 50: CPT | Performed by: INTERNAL MEDICINE

## 2024-11-03 PROCEDURE — 85520 HEPARIN ASSAY: CPT

## 2024-11-03 PROCEDURE — 63710000001 PREDNISONE PER 5 MG: Performed by: NURSE PRACTITIONER

## 2024-11-03 PROCEDURE — 80048 BASIC METABOLIC PNL TOTAL CA: CPT | Performed by: STUDENT IN AN ORGANIZED HEALTH CARE EDUCATION/TRAINING PROGRAM

## 2024-11-03 PROCEDURE — 94799 UNLISTED PULMONARY SVC/PX: CPT

## 2024-11-03 PROCEDURE — 83735 ASSAY OF MAGNESIUM: CPT | Performed by: STUDENT IN AN ORGANIZED HEALTH CARE EDUCATION/TRAINING PROGRAM

## 2024-11-03 PROCEDURE — 99232 SBSQ HOSP IP/OBS MODERATE 35: CPT | Performed by: INTERNAL MEDICINE

## 2024-11-03 PROCEDURE — 25010000002 DIGOXIN PER 500 MCG: Performed by: INTERNAL MEDICINE

## 2024-11-03 PROCEDURE — G0378 HOSPITAL OBSERVATION PER HR: HCPCS

## 2024-11-03 PROCEDURE — 25010000002 HEPARIN (PORCINE) 25000-0.45 UT/250ML-% SOLUTION

## 2024-11-03 RX ORDER — HYDROCODONE POLISTIREX AND CHLORPHENIRAMINE POLISTIREX 10; 8 MG/5ML; MG/5ML
2.5 SUSPENSION, EXTENDED RELEASE ORAL EVERY 12 HOURS PRN
Status: DISCONTINUED | OUTPATIENT
Start: 2024-11-03 | End: 2024-11-06 | Stop reason: HOSPADM

## 2024-11-03 RX ORDER — LOSARTAN POTASSIUM 25 MG/1
25 TABLET ORAL NIGHTLY
Status: DISCONTINUED | OUTPATIENT
Start: 2024-11-03 | End: 2024-11-06 | Stop reason: HOSPADM

## 2024-11-03 RX ORDER — ASPIRIN 81 MG/1
81 TABLET ORAL DAILY
Status: DISCONTINUED | OUTPATIENT
Start: 2024-11-03 | End: 2024-11-06 | Stop reason: HOSPADM

## 2024-11-03 RX ORDER — POTASSIUM CHLORIDE 1500 MG/1
20 TABLET, EXTENDED RELEASE ORAL ONCE
Status: COMPLETED | OUTPATIENT
Start: 2024-11-03 | End: 2024-11-03

## 2024-11-03 RX ORDER — IPRATROPIUM BROMIDE AND ALBUTEROL SULFATE 2.5; .5 MG/3ML; MG/3ML
3 SOLUTION RESPIRATORY (INHALATION) EVERY 4 HOURS PRN
Status: DISCONTINUED | OUTPATIENT
Start: 2024-11-03 | End: 2024-11-06 | Stop reason: HOSPADM

## 2024-11-03 RX ADMIN — AMOXICILLIN AND CLAVULANATE POTASSIUM 1 TABLET: 875; 125 TABLET, FILM COATED ORAL at 08:14

## 2024-11-03 RX ADMIN — AMOXICILLIN AND CLAVULANATE POTASSIUM 1 TABLET: 875; 125 TABLET, FILM COATED ORAL at 20:18

## 2024-11-03 RX ADMIN — FOLIC ACID 2 MG: 1 TABLET ORAL at 08:14

## 2024-11-03 RX ADMIN — FAMOTIDINE 40 MG: 20 TABLET, FILM COATED ORAL at 08:14

## 2024-11-03 RX ADMIN — Medication 10 ML: at 08:15

## 2024-11-03 RX ADMIN — FUROSEMIDE 40 MG: 10 INJECTION, SOLUTION INTRAMUSCULAR; INTRAVENOUS at 08:14

## 2024-11-03 RX ADMIN — POTASSIUM CHLORIDE 20 MEQ: 1500 TABLET, EXTENDED RELEASE ORAL at 01:31

## 2024-11-03 RX ADMIN — DIGOXIN 250 MCG: 0.25 INJECTION INTRAMUSCULAR; INTRAVENOUS at 05:14

## 2024-11-03 RX ADMIN — HYDROCODONE POLISTIREX AND CHLORPHENIRAMINE POLISTIREX 2.5 ML: 10; 8 SUSPENSION, EXTENDED RELEASE ORAL at 10:19

## 2024-11-03 RX ADMIN — HEPARIN SODIUM 17 UNITS/KG/HR: 10000 INJECTION, SOLUTION INTRAVENOUS at 13:25

## 2024-11-03 RX ADMIN — DOXYCYCLINE 100 MG: 100 CAPSULE ORAL at 08:14

## 2024-11-03 RX ADMIN — LOSARTAN POTASSIUM 25 MG: 25 TABLET, FILM COATED ORAL at 20:18

## 2024-11-03 RX ADMIN — LEVOTHYROXINE SODIUM 175 MCG: 25 TABLET ORAL at 05:15

## 2024-11-03 RX ADMIN — IPRATROPIUM BROMIDE AND ALBUTEROL SULFATE 3 ML: 2.5; .5 SOLUTION RESPIRATORY (INHALATION) at 06:56

## 2024-11-03 RX ADMIN — DOXYCYCLINE 100 MG: 100 CAPSULE ORAL at 20:18

## 2024-11-03 RX ADMIN — PREDNISONE 5 MG: 5 TABLET ORAL at 08:14

## 2024-11-03 RX ADMIN — Medication 10 ML: at 20:19

## 2024-11-03 RX ADMIN — ASPIRIN 81 MG: 81 TABLET, COATED ORAL at 10:19

## 2024-11-03 RX ADMIN — EMPAGLIFLOZIN 10 MG: 10 TABLET, FILM COATED ORAL at 08:14

## 2024-11-03 RX ADMIN — METOPROLOL SUCCINATE 50 MG: 50 TABLET, EXTENDED RELEASE ORAL at 08:14

## 2024-11-03 RX ADMIN — Medication 7.5 MG/HR: at 02:35

## 2024-11-03 NOTE — PLAN OF CARE
Goal Outcome Evaluation:              Outcome Evaluation: Pt on RA. A-fib rates between 120s-150s before dilt drip restarted. Dilt going at 5mg/hr heart currently 's. Heparin drip at 17 units/kg/hr. A&OX4. Other VSS. Pt will be NPO at midnight for possible heart cath in the morning. Pt currently in chair resting with call light in reach.

## 2024-11-03 NOTE — PLAN OF CARE
Problem: Adult Inpatient Plan of Care  Goal: Absence of Hospital-Acquired Illness or Injury  Intervention: Identify and Manage Fall Risk  Recent Flowsheet Documentation  Taken 11/3/2024 0200 by Jaquan Meza RN  Safety Promotion/Fall Prevention:   safety round/check completed   clutter free environment maintained   assistive device/personal items within reach  Taken 11/3/2024 0000 by Jaquan Meza RN  Safety Promotion/Fall Prevention:   safety round/check completed   clutter free environment maintained   assistive device/personal items within reach  Taken 11/2/2024 2200 by Jaquan Meza RN  Safety Promotion/Fall Prevention:   safety round/check completed   clutter free environment maintained   assistive device/personal items within reach   fall prevention program maintained  Taken 11/2/2024 2000 by Jaquan Meza RN  Safety Promotion/Fall Prevention:   safety round/check completed   clutter free environment maintained   assistive device/personal items within reach   fall prevention program maintained   lighting adjusted  Intervention: Prevent Skin Injury  Recent Flowsheet Documentation  Taken 11/3/2024 0200 by Jaquan Meza RN  Body Position:   position changed independently   left   side-lying  Taken 11/3/2024 0000 by Jaqaun Meza RN  Body Position: position changed independently  Taken 11/2/2024 2200 by Jaquan Meza RN  Body Position: position changed independently  Taken 11/2/2024 2000 by Jaquan Meza RN  Body Position: position changed independently  Skin Protection:   incontinence pads utilized   transparent dressing maintained  Intervention: Prevent Infection  Recent Flowsheet Documentation  Taken 11/3/2024 0200 by Jaquan Meza RN  Infection Prevention: rest/sleep promoted  Taken 11/3/2024 0000 by Jaquan Meza RN  Infection Prevention: rest/sleep promoted  Taken 11/2/2024 2200 by Jaquan Meza RN  Infection Prevention: rest/sleep promoted  Taken 11/2/2024 2000 by Jaquan Meza RN  Infection  Prevention:   hand hygiene promoted   single patient room provided     Problem: Fall Injury Risk  Goal: Absence of Fall and Fall-Related Injury  Intervention: Promote Injury-Free Environment  Recent Flowsheet Documentation  Taken 11/3/2024 0200 by Jaquan Meza, RN  Safety Promotion/Fall Prevention:   safety round/check completed   clutter free environment maintained   assistive device/personal items within reach  Taken 11/3/2024 0000 by Jaquan Meza, RN  Safety Promotion/Fall Prevention:   safety round/check completed   clutter free environment maintained   assistive device/personal items within reach  Taken 11/2/2024 2200 by Jaquan Meza, RN  Safety Promotion/Fall Prevention:   safety round/check completed   clutter free environment maintained   assistive device/personal items within reach   fall prevention program maintained  Taken 11/2/2024 2000 by Jaquan Meza, RN  Safety Promotion/Fall Prevention:   safety round/check completed   clutter free environment maintained   assistive device/personal items within reach   fall prevention program maintained   lighting adjusted   Goal Outcome Evaluation:

## 2024-11-03 NOTE — PROGRESS NOTES
[facility] Medicine Services  PROGRESS NOTE    Patient Name: Spenser Pascal  : 1960  MRN: [id number]    Date of Admission: 2024  Primary Care Physician: Jaciel Alex APRN    Subjective   Subjective     CC:  Chest pain, shortness of breath    HPI:  Still bouts of dry coughing/dyspnea, no sputum, no fever.       Objective   Objective     Vital Signs:   Temp:  [97.8 °F (36.6 °C)-98.2 °F (36.8 °C)] 98.2 °F (36.8 °C)  Heart Rate:  [] 109  Resp:  [16-18] 16  BP: ()/(40-99) 98/66     Exam:  Constitutional:Alert, oriented x 3, nontoxic appearing  Psych:Normal/appropriate affect  HEENT:NCAT, oropharynx clear  Neck: neck supple, full range of motion  Neuro: Face symmetric, speech clear, equal , moves all extremities  Cardiac: irr irr, tachycardic; No pretibial pitting edema  Resp: bibasilar crackles, no overt wheezing  GI: abd soft, nontender  Skin: No extremity rash  Musculoskeletal/extremities: no cyanosis of extremities; no significant ankle edema           Results Reviewed:  LAB RESULTS:      Lab 24  0609 24  2201 24  1405 24  0858 24  0358 24  0001 24  1819   WBC 6.29  --   --   --  7.52  --  9.22   HEMOGLOBIN 16.9  --   --   --  16.1  --  16.3   HEMATOCRIT 49.9  --   --   --  48.1  --  48.5   PLATELETS 327  --   --   --  305  --  326   NEUTROS ABS  --   --   --   --  5.13  --  6.69   IMMATURE GRANS (ABS)  --   --   --   --  0.07*  --  0.07*   LYMPHS ABS  --   --   --   --  1.24  --  1.22   MONOS ABS  --   --   --   --  0.82  --  1.04*   EOS ABS  --   --   --   --  0.19  --  0.13   MCV 93.1  --   --   --  94.5  --  93.6   CRP  --   --   --   --   --   --  2.58*   PROCALCITONIN  --   --   --   --   --   --  0.08   PROTIME  --   --   --   --   --   --  13.9   APTT  --   --   --   --   --   --  31.7*   HEPARIN ANTI-XA 0.20* 0.32 0.15* 0.21* 0.10*  --   --    HSTROP T  --   --   --   --  19 21 22*         Lab  11/03/24  0609 11/02/24  2201 11/02/24  1611 11/02/24  0858 11/01/24  1819   SODIUM 140  --   --  139 142   POTASSIUM 4.2 3.8 3.9 3.7 3.6   CHLORIDE 102  --   --  101 101   CO2 27.0  --   --  26.0 26.0   ANION GAP 11.0  --   --  12.0 15.0   BUN 21  --   --  18 22   CREATININE 0.98  --   --  0.73* 0.86   EGFR 85.0  --   --  100.3 95.5   GLUCOSE 95  --   --  90 94   CALCIUM 9.2  --   --  8.7 9.7   MAGNESIUM 2.2  --   --  1.9 1.9   PHOSPHORUS  --   --   --  3.8  --    HEMOGLOBIN A1C  --   --   --  5.50  --    TSH  --   --   --   --  1.270         Lab 11/02/24  0858 11/01/24  1819   TOTAL PROTEIN 6.2 7.1   ALBUMIN 4.0 4.3   GLOBULIN 2.2 2.8   ALT (SGPT) 38 47*   AST (SGOT) 25 32   BILIRUBIN 1.6* 1.5*   ALK PHOS 84 91         Lab 11/02/24  0358 11/02/24  0001 11/01/24  1819 10/28/24  1532   PROBNP  --   --  1,569.0* 2,002.0*   HSTROP T 19 21 22*  --    PROTIME  --   --  13.9  --    INR  --   --  1.05  --          Lab 11/02/24  0858   CHOLESTEROL 121   LDL CHOL 57   HDL CHOL 50   TRIGLYCERIDES 64             Brief Urine Lab Results       None            Microbiology Results Abnormal       Procedure Component Value - Date/Time    Respiratory Panel PCR w/COVID-19(SARS-CoV-2) VIVIAN/PIETER/URIEL/PAD/COR/TIFFANIE In-House, NP Swab in UTM/VTM, 2 HR TAT - Swab, Nasopharynx [[id number]]  (Abnormal) Collected: 11/01/24 1913    Lab Status: Final result Specimen: Swab from Nasopharynx Updated: 11/01/24 2030     ADENOVIRUS, PCR Not Detected     Coronavirus 229E Not Detected     Coronavirus HKU1 Not Detected     Coronavirus NL63 Not Detected     Coronavirus OC43 Not Detected     COVID19 Not Detected     Human Metapneumovirus Not Detected     Human Rhinovirus/Enterovirus Not Detected     Influenza A PCR Not Detected     Influenza B PCR Not Detected     Parainfluenza Virus 1 Not Detected     Parainfluenza Virus 2 Not Detected     Parainfluenza Virus 3 Not Detected     Parainfluenza Virus 4 Not Detected     RSV, PCR Detected     Bordetella pertussis  pcr Not Detected     Bordetella parapertussis PCR Not Detected     Chlamydophila pneumoniae PCR Not Detected     Mycoplasma pneumo by PCR Not Detected    Narrative:      In the setting of a positive respiratory panel with a viral infection PLUS a negative procalcitonin without other underlying concern for bacterial infection, consider observing off antibiotics or discontinuation of antibiotics and continue supportive care. If the respiratory panel is positive for atypical bacterial infection (Bordetella pertussis, Chlamydophila pneumoniae, or Mycoplasma pneumoniae), consider antibiotic de-escalation to target atypical bacterial infection.            CT Angiogram Chest Pulmonary Embolism    Result Date: 11/1/2024  CT ANGIOGRAM CHEST PULMONARY EMBOLISM Date of Exam: 11/1/2024 7:18 PM EDT Indication: sob. Comparison: None available. Technique: Axial CT images were obtained of the chest after the uneventful intravenous administration of 85 cc Isovue-370 IV contrast utilizing pulmonary embolism protocol.  Reconstructed coronal and sagittal images were also obtained. Automated exposure  control and iterative construction methods were used. Findings: The thyroid gland is within normal limits. The subglottic airway is clear. No consolidation. No pneumothorax or pleural effusion. No pulmonary embolus. No evidence of aortic dissection. Mild atheromatous disease of the coronary vessels. Cholelithiasis without evidence of cholecystitis. Splenic capsular calcifications. Stable parenchymal thoracic vertebral body height and alignment are normal. No lytic or blastic disease. The posterior elements are intact. No rib fractures.     Impression: No pulmonary embolus. No acute cardiopulmonary disease. Electronically Signed: Ciro Weiner MD  11/1/2024 7:36 PM EDT  Workstation ID: [id number]    XR Chest 1 View    Result Date: 11/1/2024  XR CHEST 1 VW Date of Exam: 11/1/2024 6:26 PM EDT Indication: SOA triage protocol Comparison:  10/28/2024 Findings: No focal consolidation. No pneumothorax or pleural effusion. Cardiomegaly. The visualized clavicles appear intact. No displaced rib fractures. The visualized upper abdomen is normal.     Impression: Impression: No acute cardiopulmonary disease. Electronically Signed: Ciro Weiner MD  11/1/2024 6:52 PM EDT  Workstation ID: [id number]     Results for orders placed during the hospital encounter of 10/30/24    Adult Transthoracic Echo Complete W/ Cont if Necessary Per Protocol    Interpretation Summary    Left ventricular systolic function is moderately decreased. Calculated left ventricular EF = 39.4% Left ventricular ejection fraction appears to be 36 - 40%.    Left ventricular diastolic function was not fully assessed.    The right ventricular cavity is mildly dilated.    The left atrial cavity is mildly dilated.    The right atrial cavity is dilated.    Moderate mitral valve regurgitation is present.    Estimated right ventricular systolic pressure from tricuspid regurgitation is mildly elevated (35-45 mmHg).    No previous echocardiogram available for comparison.      Current medications:  Scheduled Meds:amoxicillin-clavulanate, 1 tablet, Oral, BID  aspirin, 81 mg, Oral, Daily  doxycycline, 100 mg, Oral, Q12H  empagliflozin, 10 mg, Oral, Daily  famotidine, 40 mg, Oral, Daily  folic acid, 2 mg, Oral, Daily  furosemide, 40 mg, Intravenous, Daily  levothyroxine, 175 mcg, Oral, Q AM  losartan, 25 mg, Oral, Nightly  metoprolol succinate XL, 50 mg, Oral, Daily  pharmacy consult - MTM, , Does not apply, Daily  predniSONE, 5 mg, Oral, Daily  sodium chloride, 10 mL, Intravenous, Q12H      Continuous Infusions:dilTIAZem, 5-15 mg/hr, Last Rate: Stopped (11/03/24 0409)  heparin, 17 Units/kg/hr, Last Rate: 17 Units/kg/hr (11/03/24 1019)  Pharmacy to Dose Heparin,       PRN Meds:.  acetaminophen **OR** acetaminophen **OR** acetaminophen    benzonatate    senna-docusate sodium **AND** polyethylene glycol  **AND** bisacodyl **AND** bisacodyl    Calcium Replacement - Follow Nurse / BPA Driven Protocol    Hydrocod Irvin-Chlorphe Irvin ER    ipratropium-albuterol    Magnesium Standard Dose Replacement - Follow Nurse / BPA Driven Protocol    melatonin    nitroglycerin    ondansetron ODT **OR** ondansetron    Pharmacy to Dose Heparin    Phosphorus Replacement - Follow Nurse / BPA Driven Protocol    Potassium Replacement - Follow Nurse / BPA Driven Protocol    sodium chloride    [COMPLETED] Insert Peripheral IV **AND** sodium chloride    sodium chloride    Assessment & Plan   Assessment & Plan     Active Hospital Problems    Diagnosis  POA    **Atrial fibrillation with RVR [I48.91]  Yes    HFrEF (heart failure with reduced ejection fraction) [I50.20]  Yes    Immunosuppression due to drug therapy [D84.821, Z79.899]  Not Applicable    Rheumatoid arthritis of multiple sites with negative rheumatoid factor [M06.09]  Yes    Benign essential HTN [I10]  Yes    Acquired hypothyroidism [E03.9]  Yes      Resolved Hospital Problems   No resolved problems to display.        Brief Hospital Course to date:  Spenser Pascal is a 64 y.o. male with RA on methotrexate/DMARD, hypertension, hypothyroidism, recently diagnosed cardiomegaly and HFrEF who presented to the ED for chest pain and shortness of breath.  Patient found to be in A-fib with RVR and was found to be RSV positive.    A-fib RVR, new onset  Acute HFrEF (EF 36-40% by echo 10/30/24); Newly diagnosed cardiomyopathy  Moderate  Mitral valve regurgitation (on 10/30/24 echo)  Chest pain & dyspnea on exertion, in setting of afib w/ rvr  HTN  -cta chest 11/1/24: no pe, no acute cardio-pulmonary dz  -troponin neg x 2, EKG without dynamic st changes  -mild atheromatous dz of coronary vessels suggested on cta chest  -echo 10/30/24: LV EF 36-40%, moderate MR  -Cards following: continue cardizem drip & oral toprol for rate control; continue iv heparin drip; continue daily IV lasix & po  jardiance; adding los dose losartan, asa 81mg  -if respiratory status continue to improve can consider cardiac cath tomorrow Monday 11/4/24 & subsequent Mable/ECV (npo after midnight)  -changing duonebs to prn      Concern for Pneumonia, poa  Acute RSV + bronchitis 11/1/24  -had been initiated on steroids & augmentin/doxy as outpatient due to concern for pneumonia  -rsv + on 11/1/24  -procal negative & normal wbbc on 11/1/24  -ct angio chest negative for acute pulm dz on 11/1/24  -completed 3 days augmentin/doxy prior to admission; completes 5 days of empiric augmentin/doxy 11/3/24  -change duonebs to prn  -completes prednisone 5mg on 11/4/24    RA on chronic immunosuppressive therapy  - On methotrexate and Orencia weekly injections follows with rheumatology as an outpatient  - Currently on hold      Hypothyroidism  - TSH within normal limits  - Continue levothyroxine    JAZMIN  - Does not use CPAP    Amlabs: cbc,bmp,mag      Expected Discharge Location and Transportation: TBD  Expected Discharge   Expected Discharge Date: 11/5/2024; Expected Discharge Time:      VTE Prophylaxis:  Pharmacologic VTE prophylaxis orders are present.         AM-PAC 6 Clicks Score (PT): 24 (11/03/24 0800)    CODE STATUS:   Code Status and Medical Interventions: CPR (Attempt to Resuscitate); Full Support   Ordered at: 11/01/24 5906     Code Status (Patient has no pulse and is not breathing):    CPR (Attempt to Resuscitate)     Medical Interventions (Patient has pulse or is breathing):    Full Support       Jcarlos Gore MD  11/03/24

## 2024-11-03 NOTE — PROGRESS NOTES
"[facility]  IP Progress Note      Chief Complaint: Follow-up of acute HFrEF, cardiomyopathy and atrial fibrillation.    Subjective   Resting in bed, continues to have off-and-on cough, breathing has improved.  Has been experiencing some chest pressure.  Ambulated to the bathroom and back without problems.    Objective     Blood pressure 121/88, pulse 103, temperature 98.2 °F (36.8 °C), temperature source Oral, resp. rate 16, height 182.9 cm (72\"), weight 93.8 kg (206 lb 12.7 oz), SpO2 (!) 84%.     Intake/Output Summary (Last 24 hours) at 11/3/2024 0913  Last data filed at 11/3/2024 0330  Gross per 24 hour   Intake 1597.3 ml   Output 200 ml   Net 1397.3 ml       Physical Exam:  General: No acute distress.   Neck: no JVD.  Chest:No respiratory distress, breath sounds are slightly diminished at bases, scattered rhonchi and basilar crackles.  Cardiovascular: Normal S1 and S2, irregular/distant.  Extremities: No edema.    Results Review:     I reviewed the patient's new clinical results.    Results from last 7 days   Lab Units 11/03/24  0609   WBC 10*3/mm3 6.29   HEMOGLOBIN g/dL 16.9   HEMATOCRIT % 49.9   PLATELETS 10*3/mm3 327     Results from last 7 days   Lab Units 11/03/24  0609 11/02/24  1611 11/02/24  0858   SODIUM mmol/L 140  --  139   POTASSIUM mmol/L 4.2   < > 3.7   CHLORIDE mmol/L 102  --  101   CO2 mmol/L 27.0  --  26.0   BUN mg/dL 21  --  18   CREATININE mg/dL 0.98  --  0.73*   CALCIUM mg/dL 9.2  --  8.7   BILIRUBIN mg/dL  --   --  1.6*   ALK PHOS U/L  --   --  84   ALT (SGPT) U/L  --   --  38   AST (SGOT) U/L  --   --  25   GLUCOSE mg/dL 95  --  90    < > = values in this interval not displayed.     Results from last 7 days   Lab Units 11/03/24  0609   SODIUM mmol/L 140   POTASSIUM mmol/L 4.2   CHLORIDE mmol/L 102   CO2 mmol/L 27.0   BUN mg/dL 21   CREATININE mg/dL 0.98   GLUCOSE mg/dL 95   CALCIUM mg/dL 9.2     Results from last 7 days   Lab Units 11/01/24  1819   INR  " 1.05     Lab Results   Component Value Date    TROPONINT 19 11/02/2024     Results from last 7 days   Lab Units 11/02/24  0858 11/01/24  1819   TSH uIU/mL  --  1.270   FREE T4 ng/dL 1.68  --      Results from last 7 days   Lab Units 11/02/24  0858   CHOLESTEROL mg/dL 121   TRIGLYCERIDES mg/dL 64   HDL CHOL mg/dL 50   LDL CHOL mg/dL 57     amoxicillin-clavulanate, 1 tablet, Oral, BID  doxycycline, 100 mg, Oral, Q12H  empagliflozin, 10 mg, Oral, Daily  famotidine, 40 mg, Oral, Daily  folic acid, 2 mg, Oral, Daily  furosemide, 40 mg, Intravenous, Daily  ipratropium-albuterol, 3 mL, Nebulization, Q6H While Awake - RT  levothyroxine, 175 mcg, Oral, Q AM  metoprolol succinate XL, 50 mg, Oral, Daily  pharmacy consult - MTM, , Does not apply, Daily  predniSONE, 5 mg, Oral, Daily  sodium chloride, 10 mL, Intravenous, Q12H       Tele: Atrial Fib with Controlled Rate      Assessment:  Atrial fibrillation with RVR, new onset  Acute HFrEF, EF 36-40% by echo 10/30/2024.  Cardiomyopathy, new diagnosis.  Chest pain/dyspnea on exertion, in the setting of afib RVR  Tropnin negative x2, ECG with no acute ST-T changes  Mild atheromatous disease of the coronary vessels on CTA chest  Pulmonary edema vs pneumonia  Acute bronchitis RSV (+), 11/1/2024  VHD/moderate MR  Hypertension  RA on chronic immunosuppressive therapy    Plan:  Continue current management with intravenous Cardizem for rate control and intravenous heparin for anticoagulation.  Received loading dose of digoxin, also continue current dose of Toprol-XL.  Continue daily Lasix and Jardiance.  Antibiotic therapy per hospitalist and medical management  Renal function is stable, we will add low-dose losartan.  Add aspirin 81 mg daily.  If respiratory status significantly improves we can consider cardiac cath tomorrow and subsequent KEE/ECV when patient clinically ready.  Keep n.p.o. after midnight.    Rebeka Franz MD, FACC, [facility]

## 2024-11-03 NOTE — PROGRESS NOTES
Pharmacy to Dose Heparin Infusion Note    Spenser Pascal is a 64 y.o. male receiving heparin infusion.     Therapy for (VTE/Cardiac): Cardiac  Patient Weight: 94 kg  Initial Bolus (Y/N): Yes  Any Bolus (Y/N): Yes     Signs or Symptoms of Bleeding: N/A    Cardiac or Other (Not VTE)   Initial Bolus: 60 units/kg (Max 4,000 units)  Initial rate: 12 units/kg/hr (Max 1,000 units/hr)   Anti-Xa Bolus   Dose Infusion Hold   Time Infusion Rate Change (units/kg/hr) Repeat  Anti-Xa    < 0.11 50 units/kg  (4000 units Max) None Increase by  3 units/kg/hr 6 hours   0.11- 0.19 25 units/kg  (2000 units Max) None Increase by  2 units/kg/hr 6 hours   0.2 - 0.29 0 None Increase by  1 units/kg/hr 6 hours   0.3 - 0.5 0 None No Change 6 hours (after 2 consecutive levels in range check qAM)   0.51 - 0.6 0 None Decrease by  1 units/kg/hr 6 hours   0.61 - 0.8 0 30 minutes Decrease by  2 units/kg/hr 6 hours   0.81 - 1 0 60 minutes Decrease by  3 units/kg/hr 6 hours   >1 0 Hold  After Anti-Xa less than 0.5 decrease previous rate by  4 units/kg/hr  Every 2 hours until Anti-Xa  less than 0.5 then when infusion restarts in 6 hours     Results from last 7 days   Lab Units 11/03/24  0609 11/02/24  0358 11/01/24  1819   INR   --   --  1.05   HEMOGLOBIN g/dL 16.9 16.1 16.3   HEMATOCRIT % 49.9 48.1 48.5   PLATELETS 10*3/mm3 327 305 326       Date   Time   Anti-Xa Current Rate (units/kg/hr) Bolus   (units) Rate Change   (units/kg/hr) New Rate (units/kg/hr) Repeat  Anti-Xa Comments /  Pump Check    11/1 2127 PTT 31.7 New 4000 +10 10 0300  Dw ED RN. Pump confirmed, patient counseled    11/2 0358 0.1 10 4000 +3 13 1200 Nurse confirmed pump   11/2  1405 0.15 13 2000 +2 15 2100 D/w KASSANDRA Anglin       11/2 1610 -- 15 -- -- 16 2100 Changed wt on order to match new Epic wt--units/hr remain the same;   D/w KASSANDRA Anglin   11/2 2201 0.32 16 -- -- 16 0600 Jaquan [facility]   11/3 0609 0.20 16 -- +1 17 1400 DW RN. Pump verified.                                                                                                                                                                         Terence Watkins, PharmD   11/3/2024   07:23 EST

## 2024-11-04 LAB
ANION GAP SERPL CALCULATED.3IONS-SCNC: 16 MMOL/L (ref 5–15)
BUN SERPL-MCNC: 17 MG/DL (ref 8–23)
BUN/CREAT SERPL: 18.5 (ref 7–25)
CALCIUM SPEC-SCNC: 9.3 MG/DL (ref 8.6–10.5)
CHLORIDE SERPL-SCNC: 100 MMOL/L (ref 98–107)
CO2 SERPL-SCNC: 24 MMOL/L (ref 22–29)
CREAT SERPL-MCNC: 0.92 MG/DL (ref 0.76–1.27)
DEPRECATED RDW RBC AUTO: 50.1 FL (ref 37–54)
EGFRCR SERPLBLD CKD-EPI 2021: 91.7 ML/MIN/1.73
ERYTHROCYTE [DISTWIDTH] IN BLOOD BY AUTOMATED COUNT: 14.5 % (ref 12.3–15.4)
GLUCOSE SERPL-MCNC: 96 MG/DL (ref 65–99)
HCT VFR BLD AUTO: 54.6 % (ref 37.5–51)
HGB BLD-MCNC: 18.2 G/DL (ref 13–17.7)
MAGNESIUM SERPL-MCNC: 2.1 MG/DL (ref 1.6–2.4)
MCH RBC QN AUTO: 31.7 PG (ref 26.6–33)
MCHC RBC AUTO-ENTMCNC: 33.3 G/DL (ref 31.5–35.7)
MCV RBC AUTO: 95.1 FL (ref 79–97)
PLATELET # BLD AUTO: 424 10*3/MM3 (ref 140–450)
PMV BLD AUTO: 9.7 FL (ref 6–12)
POTASSIUM SERPL-SCNC: 4.4 MMOL/L (ref 3.5–5.2)
RBC # BLD AUTO: 5.74 10*6/MM3 (ref 4.14–5.8)
SODIUM SERPL-SCNC: 140 MMOL/L (ref 136–145)
UFH PPP CHRO-ACNC: 0.63 IU/ML (ref 0.3–0.7)
WBC NRBC COR # BLD AUTO: 6.85 10*3/MM3 (ref 3.4–10.8)

## 2024-11-04 PROCEDURE — C1894 INTRO/SHEATH, NON-LASER: HCPCS | Performed by: INTERNAL MEDICINE

## 2024-11-04 PROCEDURE — 25010000002 HEPARIN (PORCINE) PER 1000 UNITS: Performed by: INTERNAL MEDICINE

## 2024-11-04 PROCEDURE — 25010000002 LIDOCAINE PF 1% 1 % SOLUTION: Performed by: INTERNAL MEDICINE

## 2024-11-04 PROCEDURE — 99232 SBSQ HOSP IP/OBS MODERATE 35: CPT | Performed by: INTERNAL MEDICINE

## 2024-11-04 PROCEDURE — 25010000002 FUROSEMIDE PER 20 MG: Performed by: INTERNAL MEDICINE

## 2024-11-04 PROCEDURE — 83735 ASSAY OF MAGNESIUM: CPT | Performed by: INTERNAL MEDICINE

## 2024-11-04 PROCEDURE — C1769 GUIDE WIRE: HCPCS | Performed by: INTERNAL MEDICINE

## 2024-11-04 PROCEDURE — 93458 L HRT ARTERY/VENTRICLE ANGIO: CPT | Performed by: INTERNAL MEDICINE

## 2024-11-04 PROCEDURE — 25010000002 HEPARIN (PORCINE) 25000-0.45 UT/250ML-% SOLUTION

## 2024-11-04 PROCEDURE — 80048 BASIC METABOLIC PNL TOTAL CA: CPT | Performed by: INTERNAL MEDICINE

## 2024-11-04 PROCEDURE — 4A023N7 MEASUREMENT OF CARDIAC SAMPLING AND PRESSURE, LEFT HEART, PERCUTANEOUS APPROACH: ICD-10-PCS | Performed by: INTERNAL MEDICINE

## 2024-11-04 PROCEDURE — B2151ZZ FLUOROSCOPY OF LEFT HEART USING LOW OSMOLAR CONTRAST: ICD-10-PCS | Performed by: INTERNAL MEDICINE

## 2024-11-04 PROCEDURE — 63710000001 PREDNISONE PER 5 MG: Performed by: NURSE PRACTITIONER

## 2024-11-04 PROCEDURE — 85520 HEPARIN ASSAY: CPT

## 2024-11-04 PROCEDURE — 25010000002 NICARDIPINE 2.5 MG/ML SOLUTION: Performed by: INTERNAL MEDICINE

## 2024-11-04 PROCEDURE — 25010000002 MIDAZOLAM PER 1 MG: Performed by: INTERNAL MEDICINE

## 2024-11-04 PROCEDURE — 85027 COMPLETE CBC AUTOMATED: CPT | Performed by: INTERNAL MEDICINE

## 2024-11-04 PROCEDURE — 25810000003 SODIUM CHLORIDE 0.9 % SOLUTION: Performed by: INTERNAL MEDICINE

## 2024-11-04 PROCEDURE — B2111ZZ FLUOROSCOPY OF MULTIPLE CORONARY ARTERIES USING LOW OSMOLAR CONTRAST: ICD-10-PCS | Performed by: INTERNAL MEDICINE

## 2024-11-04 PROCEDURE — 25510000001 IOPAMIDOL PER 1 ML: Performed by: INTERNAL MEDICINE

## 2024-11-04 RX ORDER — SODIUM CHLORIDE 9 MG/ML
100 INJECTION, SOLUTION INTRAVENOUS CONTINUOUS
Status: ACTIVE | OUTPATIENT
Start: 2024-11-04 | End: 2024-11-04

## 2024-11-04 RX ORDER — MIDAZOLAM HYDROCHLORIDE 1 MG/ML
INJECTION, SOLUTION INTRAMUSCULAR; INTRAVENOUS
Status: DISCONTINUED | OUTPATIENT
Start: 2024-11-04 | End: 2024-11-04 | Stop reason: HOSPADM

## 2024-11-04 RX ORDER — IOPAMIDOL 755 MG/ML
INJECTION, SOLUTION INTRAVASCULAR
Status: DISCONTINUED | OUTPATIENT
Start: 2024-11-04 | End: 2024-11-04 | Stop reason: HOSPADM

## 2024-11-04 RX ORDER — HEPARIN SODIUM 1000 [USP'U]/ML
INJECTION, SOLUTION INTRAVENOUS; SUBCUTANEOUS
Status: DISCONTINUED | OUTPATIENT
Start: 2024-11-04 | End: 2024-11-04 | Stop reason: HOSPADM

## 2024-11-04 RX ORDER — LIDOCAINE HYDROCHLORIDE 10 MG/ML
INJECTION, SOLUTION EPIDURAL; INFILTRATION; INTRACAUDAL; PERINEURAL
Status: DISCONTINUED | OUTPATIENT
Start: 2024-11-04 | End: 2024-11-04 | Stop reason: HOSPADM

## 2024-11-04 RX ADMIN — HYDROCODONE POLISTIREX AND CHLORPHENIRAMINE POLISTIREX 2.5 ML: 10; 8 SUSPENSION, EXTENDED RELEASE ORAL at 17:42

## 2024-11-04 RX ADMIN — METOPROLOL SUCCINATE 50 MG: 50 TABLET, EXTENDED RELEASE ORAL at 09:24

## 2024-11-04 RX ADMIN — Medication 10 ML: at 09:25

## 2024-11-04 RX ADMIN — Medication 10 ML: at 20:24

## 2024-11-04 RX ADMIN — APIXABAN 5 MG: 5 TABLET, FILM COATED ORAL at 20:20

## 2024-11-04 RX ADMIN — SODIUM CHLORIDE 100 ML/HR: 9 INJECTION, SOLUTION INTRAVENOUS at 13:31

## 2024-11-04 RX ADMIN — PREDNISONE 5 MG: 5 TABLET ORAL at 09:25

## 2024-11-04 RX ADMIN — FUROSEMIDE 40 MG: 10 INJECTION, SOLUTION INTRAMUSCULAR; INTRAVENOUS at 09:24

## 2024-11-04 RX ADMIN — FAMOTIDINE 40 MG: 20 TABLET, FILM COATED ORAL at 09:24

## 2024-11-04 RX ADMIN — Medication 5 MG/HR: at 11:47

## 2024-11-04 RX ADMIN — LEVOTHYROXINE SODIUM 175 MCG: 25 TABLET ORAL at 05:13

## 2024-11-04 RX ADMIN — EMPAGLIFLOZIN 10 MG: 10 TABLET, FILM COATED ORAL at 09:25

## 2024-11-04 RX ADMIN — HEPARIN SODIUM 16.5 UNITS/KG/HR: 10000 INJECTION, SOLUTION INTRAVENOUS at 03:27

## 2024-11-04 RX ADMIN — APIXABAN 5 MG: 5 TABLET, FILM COATED ORAL at 13:31

## 2024-11-04 RX ADMIN — FOLIC ACID 2 MG: 1 TABLET ORAL at 09:24

## 2024-11-04 RX ADMIN — ASPIRIN 81 MG: 81 TABLET, COATED ORAL at 09:24

## 2024-11-04 RX ADMIN — LOSARTAN POTASSIUM 25 MG: 25 TABLET, FILM COATED ORAL at 20:20

## 2024-11-04 NOTE — PLAN OF CARE
Problem: Adult Inpatient Plan of Care  Goal: Plan of Care Review  Outcome: Progressing  Goal: Patient-Specific Goal (Individualized)  Outcome: Progressing  Goal: Absence of Hospital-Acquired Illness or Injury  Outcome: Progressing  Intervention: Identify and Manage Fall Risk  Recent Flowsheet Documentation  Taken 11/4/2024 0400 by Suha Mcbride RN  Safety Promotion/Fall Prevention:   nonskid shoes/slippers when out of bed   room organization consistent   safety round/check completed  Taken 11/4/2024 0200 by Suha Mcbride RN  Safety Promotion/Fall Prevention:   assistive device/personal items within reach   lighting adjusted   nonskid shoes/slippers when out of bed   room organization consistent   safety round/check completed   toileting scheduled  Taken 11/4/2024 0000 by Suha Mcbride RN  Safety Promotion/Fall Prevention:   clutter free environment maintained   lighting adjusted   nonskid shoes/slippers when out of bed   room organization consistent   safety round/check completed  Taken 11/3/2024 2200 by Suha Mcbride RN  Safety Promotion/Fall Prevention:   activity supervised   nonskid shoes/slippers when out of bed   safety round/check completed  Taken 11/3/2024 2000 by Suha Mcbride RN  Safety Promotion/Fall Prevention:   activity supervised   assistive device/personal items within reach   lighting adjusted   nonskid shoes/slippers when out of bed   safety round/check completed   toileting scheduled   room organization consistent  Intervention: Prevent Skin Injury  Recent Flowsheet Documentation  Taken 11/4/2024 0400 by Suha Mcbride RN  Body Position: position changed independently  Taken 11/4/2024 0200 by Suha Mcbride RN  Body Position: position changed independently  Taken 11/4/2024 0000 by Suha Mcbride RN  Body Position: position changed independently  Taken 11/3/2024 2000 by Suha Mcbride RN  Body Position: position changed independently  Intervention: Prevent and  Manage VTE (Venous Thromboembolism) Risk  Recent Flowsheet Documentation  Taken 11/3/2024 2000 by Suha Mcbride RN  VTE Prevention/Management: (see MAR) other (see comments)  Goal: Optimal Comfort and Wellbeing  Outcome: Progressing  Goal: Readiness for Transition of Care  Outcome: Progressing     Problem: Fall Injury Risk  Goal: Absence of Fall and Fall-Related Injury  Outcome: Progressing  Intervention: Promote Injury-Free Environment  Recent Flowsheet Documentation  Taken 11/4/2024 0400 by Suha Mcbride RN  Safety Promotion/Fall Prevention:   nonskid shoes/slippers when out of bed   room organization consistent   safety round/check completed  Taken 11/4/2024 0200 by Suha Mcbride RN  Safety Promotion/Fall Prevention:   assistive device/personal items within reach   lighting adjusted   nonskid shoes/slippers when out of bed   room organization consistent   safety round/check completed   toileting scheduled  Taken 11/4/2024 0000 by Suha Mcbride RN  Safety Promotion/Fall Prevention:   clutter free environment maintained   lighting adjusted   nonskid shoes/slippers when out of bed   room organization consistent   safety round/check completed  Taken 11/3/2024 2200 by Suha Mcbride RN  Safety Promotion/Fall Prevention:   activity supervised   nonskid shoes/slippers when out of bed   safety round/check completed  Taken 11/3/2024 2000 by Suha Mcbride RN  Safety Promotion/Fall Prevention:   activity supervised   assistive device/personal items within reach   lighting adjusted   nonskid shoes/slippers when out of bed   safety round/check completed   toileting scheduled   room organization consistent   Goal Outcome Evaluation:

## 2024-11-04 NOTE — CASE MANAGEMENT/SOCIAL WORK
Discharge Planning Assessment  [facility]     Patient Name: Spenser Pascal  MRN: [id number]  Today's Date: 11/4/2024    Admit Date: 11/1/2024    Plan: Home   Discharge Needs Assessment       Row Name 11/04/24 1126       Living Environment    People in Home spouse    Name(s) of People in Home Shanika Pascal, spouse [phone]    Current Living Arrangements home    Potentially Unsafe Housing Conditions none    Primary Care Provided by self    Provides Primary Care For no one    Family Caregiver if Needed spouse    Family Caregiver Names Shanika, spouse    Quality of Family Relationships unable to assess    Able to Return to Prior Arrangements yes       Transition Planning    Patient/Family Anticipates Transition to home with family    Patient/Family Anticipated Services at Transition none    Transportation Anticipated family or friend will provide       Discharge Needs Assessment    Readmission Within the Last 30 Days no previous admission in last 30 days    Equipment Currently Used at Home none    Concerns to be Addressed denies needs/concerns at this time                   Discharge Plan       Row Name 11/04/24 1127       Plan    Plan Home    Patient/Family in Agreement with Plan yes    Plan Comments Spoke with patient at bedside to initiate discharge planning. Patient lives with his spouse in [facility]. Prior to admission, was not current with any home health agency. Patient is independent with ADL's and does not require the use of any DME. Confirmed PCP is MOON Richey. Verified insurance is [employer]. Patient states he has no issues affording his medications and has his prescriptions filled at [facility]. Plan is for patient to return home at discharge. CM will continue to follow and assist with discharge planning.    Final Discharge Disposition Code 01 - home or self-care                  Continued Care and Services - Admitted Since 11/1/2024    No active coordination exists for this  encounter.       Expected Discharge Date and Time       Expected Discharge Date Expected Discharge Time    Nov 5, 2024            Demographic Summary       Row Name 11/04/24 1125       General Information    Admission Type inpatient    Arrived From emergency department    Reason for Consult discharge planning    Preferred Language English       Contact Information    Permission Granted to Share Info With ;family/designee                   Functional Status       Row Name 11/04/24 1126       Functional Status    Usual Activity Tolerance good    Current Activity Tolerance good       Functional Status, IADL    Medications independent    Meal Preparation independent    Housekeeping independent    Laundry independent    Shopping independent       Mental Status    General Appearance WDL WDL       Mental Status Summary    Recent Changes in Mental Status/Cognitive Functioning no changes       Employment/    Employment Status employed full-time                   Psychosocial    No documentation.                  Abuse/Neglect    No documentation.                  Legal    No documentation.                  Substance Abuse    No documentation.                  Patient Forms    No documentation.                     Rod Webster RN

## 2024-11-04 NOTE — PROGRESS NOTES
"[facility]  IP Progress Note      Chief Complaint: Follow-up of acute HFrEF, cardiomyopathy and atrial fibrillation.    Subjective   Reports feeling better, still has cough and congestion, breathing has improved.  Had some chest pressure yesterday but better this morning.    Objective     Blood pressure 110/70, pulse 99, temperature 97.9 °F (36.6 °C), temperature source Oral, resp. rate 16, height 182.9 cm (72\"), weight 93.8 kg (206 lb 14.4 oz), SpO2 92%.     Intake/Output Summary (Last 24 hours) at 11/4/2024 1114  Last data filed at 11/3/2024 2100  Gross per 24 hour   Intake 1825.78 ml   Output --   Net 1825.78 ml       Physical Exam:  General: No apparent distress.  Neck: no JVD.  Chest:No respiratory distress, breath sounds are slightly diminished at bases, scattered rhonchi and fine basilar crackles.  Cardiovascular: Normal S1 and S2, irregular/distant.  Extremities: No edema.      Results Review:     I reviewed the patient's new clinical results.    Results from last 7 days   Lab Units 11/04/24  1022   WBC 10*3/mm3 6.85   HEMOGLOBIN g/dL 18.2*   HEMATOCRIT % 54.6*   PLATELETS 10*3/mm3 424     Results from last 7 days   Lab Units 11/03/24  0609 11/02/24  1611 11/02/24  0858   SODIUM mmol/L 140  --  139   POTASSIUM mmol/L 4.2   < > 3.7   CHLORIDE mmol/L 102  --  101   CO2 mmol/L 27.0  --  26.0   BUN mg/dL 21  --  18   CREATININE mg/dL 0.98  --  0.73*   CALCIUM mg/dL 9.2  --  8.7   BILIRUBIN mg/dL  --   --  1.6*   ALK PHOS U/L  --   --  84   ALT (SGPT) U/L  --   --  38   AST (SGOT) U/L  --   --  25   GLUCOSE mg/dL 95  --  90    < > = values in this interval not displayed.     Results from last 7 days   Lab Units 11/03/24  0609   SODIUM mmol/L 140   POTASSIUM mmol/L 4.2   CHLORIDE mmol/L 102   CO2 mmol/L 27.0   BUN mg/dL 21   CREATININE mg/dL 0.98   GLUCOSE mg/dL 95   CALCIUM mg/dL 9.2     Results from last 7 days   Lab Units 11/01/24  1819   INR  1.05     Lab Results "   Component Value Date    TROPONINT 19 11/02/2024     Results from last 7 days   Lab Units 11/02/24  0858 11/01/24  1819   TSH uIU/mL  --  1.270   FREE T4 ng/dL 1.68  --      Results from last 7 days   Lab Units 11/02/24  0858   CHOLESTEROL mg/dL 121   TRIGLYCERIDES mg/dL 64   HDL CHOL mg/dL 50   LDL CHOL mg/dL 57     [Transfer Hold] aspirin, 81 mg, Oral, Daily  [Transfer Hold] empagliflozin, 10 mg, Oral, Daily  famotidine, 40 mg, Oral, Daily  [Transfer Hold] folic acid, 2 mg, Oral, Daily  [Transfer Hold] furosemide, 40 mg, Intravenous, Daily  [Transfer Hold] levothyroxine, 175 mcg, Oral, Q AM  losartan, 25 mg, Oral, Nightly  metoprolol succinate XL, 50 mg, Oral, Daily  [Transfer Hold] pharmacy consult - MTM, , Does not apply, Daily  [Transfer Hold] sodium chloride, 10 mL, Intravenous, Q12H       Tele: Atrial Fib with Controlled Rate      Assessment:  Atrial fibrillation with RVR, new onset  Acute HFrEF, EF 36-40% by echo 10/30/2024.  Nonischemic cardiomyopathy, estimated EF 30% by cardiac cath.  Chest pain/dyspnea on exertion, in the setting of afib RVR  Tropnin negative x2, ECG with no acute ST-T changes  Mild atheromatous disease of the coronary vessels on CTA chest  Pulmonary edema vs pneumonia  Acute bronchitis RSV (+), 11/1/2024  VHD/moderate MR  Hypertension  RA on chronic immunosuppressive therapy    Plan:  Patient underwent cardiac attrition study today which showed normal coronary arteries with EF 30%.  Findings are consistent with nonischemic cardiomyopathy.  We will discontinue heparin and start Eliquis for anticoagulation in the setting of atrial fibrillation.  Continue low-dose aspirin, current dose of Toprol, Lasix and Jardiance.  Continue IV diltiazem for rate control.  Continue low-dose losartan.  Monitor renal function and consider adding spironolactone before discharge.  Patient still coughing and has dyspnea, not ready for KEE at this time.  Will schedule KEE/ECV for tomorrow.  Possible discharge  to home after cardioversion tomorrow, he will need LifeVest at discharge.  CHF navigator consultation for close follow-up in CHF clinic to optimize GDMT.  He will follow-up with me in 4 to 6 weeks as outpatient.    Rebeka Franz MD, FACC, [facility]

## 2024-11-04 NOTE — PROGRESS NOTES
[facility] Medicine Services  PROGRESS NOTE    Patient Name: Spenser Pascal  : 1960  MRN: [id number]    Date of Admission: 2024  Primary Care Physician: Jaciel Alex APRN    Subjective   Subjective     CC:  Chest pain, shortness of breath    HPI:  Dyspnea improved, cough improved, overall feels better daily. No fever. No chest pain      Objective   Objective     Vital Signs:   Temp:  [97.6 °F (36.4 °C)-97.9 °F (36.6 °C)] 97.7 °F (36.5 °C)  Heart Rate:  [] 91  Resp:  [16-18] 18  BP: ()/() 101/76     Exam:  Constitutional:Alert, oriented x 3, nontoxic appearing  Psych:Normal/appropriate affect  HEENT:NCAT, oropharynx clear  Neck: neck supple, full range of motion  Neuro: Face symmetric, speech clear, equal , moves all extremities  Cardiac: irr irr, regular rate; No pretibial pitting edema  Resp: faint bibasilar crackles, normal resp effort  GI: abd soft, nontender  Skin: No extremity rash  Musculoskeletal/extremities: no cyanosis of extremities; no significant ankle edema           Results Reviewed:  LAB RESULTS:      Lab 24  1022 24  2215 24  1600 24  0609 24  2201 24  0858 24  0358 24  0001 24  1819   WBC 6.85  --   --  6.29  --   --  7.52  --  9.22   HEMOGLOBIN 18.2*  --   --  16.9  --   --  16.1  --  16.3   HEMATOCRIT 54.6*  --   --  49.9  --   --  48.1  --  48.5   PLATELETS 424  --   --  327  --   --  305  --  326   NEUTROS ABS  --   --   --   --   --   --  5.13  --  6.69   IMMATURE GRANS (ABS)  --   --   --   --   --   --  0.07*  --  0.07*   LYMPHS ABS  --   --   --   --   --   --  1.24  --  1.22   MONOS ABS  --   --   --   --   --   --  0.82  --  1.04*   EOS ABS  --   --   --   --   --   --  0.19  --  0.13   MCV 95.1  --   --  93.1  --   --  94.5  --  93.6   CRP  --   --   --   --   --   --   --   --  2.58*   PROCALCITONIN  --   --   --   --   --   --   --   --  0.08   PROTIME  --    --   --   --   --   --   --   --  13.9   APTT  --   --   --   --   --   --   --   --  31.7*   HEPARIN ANTI-XA 0.63 0.51 0.39 0.20* 0.32   < > 0.10*  --   --    HSTROP T  --   --   --   --   --   --  19 21 22*    < > = values in this interval not displayed.         Lab 11/04/24  1022 11/03/24  0609 11/02/24  2201 11/02/24  1611 11/02/24  0858 11/01/24  1819   SODIUM 140 140  --   --  139 142   POTASSIUM 4.4 4.2 3.8 3.9 3.7 3.6   CHLORIDE 100 102  --   --  101 101   CO2 24.0 27.0  --   --  26.0 26.0   ANION GAP 16.0* 11.0  --   --  12.0 15.0   BUN 17 21  --   --  18 22   CREATININE 0.92 0.98  --   --  0.73* 0.86   EGFR 91.7 85.0  --   --  100.3 95.5   GLUCOSE 96 95  --   --  90 94   CALCIUM 9.3 9.2  --   --  8.7 9.7   MAGNESIUM 2.1 2.2  --   --  1.9 1.9   PHOSPHORUS  --   --   --   --  3.8  --    HEMOGLOBIN A1C  --   --   --   --  5.50  --    TSH  --   --   --   --   --  1.270         Lab 11/02/24  0858 11/01/24  1819   TOTAL PROTEIN 6.2 7.1   ALBUMIN 4.0 4.3   GLOBULIN 2.2 2.8   ALT (SGPT) 38 47*   AST (SGOT) 25 32   BILIRUBIN 1.6* 1.5*   ALK PHOS 84 91         Lab 11/02/24  0358 11/02/24  0001 11/01/24  1819   PROBNP  --   --  1,569.0*   HSTROP T 19 21 22*   PROTIME  --   --  13.9   INR  --   --  1.05         Lab 11/02/24  0858   CHOLESTEROL 121   LDL CHOL 57   HDL CHOL 50   TRIGLYCERIDES 64             Brief Urine Lab Results       None            Microbiology Results Abnormal       Procedure Component Value - Date/Time    Respiratory Panel PCR w/COVID-19(SARS-CoV-2) VIVIAN/PIETER/URIEL/PAD/COR/TIFFANIE In-House, NP Swab in UTM/VTM, 2 HR TAT - Swab, Nasopharynx [[id number]]  (Abnormal) Collected: 11/01/24 1913    Lab Status: Final result Specimen: Swab from Nasopharynx Updated: 11/01/24 2030     ADENOVIRUS, PCR Not Detected     Coronavirus 229E Not Detected     Coronavirus HKU1 Not Detected     Coronavirus NL63 Not Detected     Coronavirus OC43 Not Detected     COVID19 Not Detected     Human Metapneumovirus Not Detected      Human Rhinovirus/Enterovirus Not Detected     Influenza A PCR Not Detected     Influenza B PCR Not Detected     Parainfluenza Virus 1 Not Detected     Parainfluenza Virus 2 Not Detected     Parainfluenza Virus 3 Not Detected     Parainfluenza Virus 4 Not Detected     RSV, PCR Detected     Bordetella pertussis pcr Not Detected     Bordetella parapertussis PCR Not Detected     Chlamydophila pneumoniae PCR Not Detected     Mycoplasma pneumo by PCR Not Detected    Narrative:      In the setting of a positive respiratory panel with a viral infection PLUS a negative procalcitonin without other underlying concern for bacterial infection, consider observing off antibiotics or discontinuation of antibiotics and continue supportive care. If the respiratory panel is positive for atypical bacterial infection (Bordetella pertussis, Chlamydophila pneumoniae, or Mycoplasma pneumoniae), consider antibiotic de-escalation to target atypical bacterial infection.            Cardiac Catheterization/Vascular Study    Result Date: 11/4/2024  FINAL     Impression: No evidence of urinary significant coronary disease. Severe left ventricular systolic dysfunction, estimated EF 30%. RECOMMENDATIONS: Optimize medical therapy and risk factor management per guidelines. LifeVest for now with careful monitoring for recovery of LV function and future consideration of ICD in case of persistent LV dysfunction. Indications: Chest pain/cardiomyopathy. Access: Right radial. Procedures: Left heart catheterization. Left ventriculogram. Selective coronary angiography. Arterial site hemostasis with radial band. Procedure narrative: The patient was brought to the catheterization lab in a fasting condition.  Access site was prepped and draped in standard sterile fashion.  Lidocaine was injected and arterial access was obtained by percutaneous anterior wall puncture technique.  A 6 [place] arterial sheath was placed. Above procedures were performed without  complications.  At the conclusion the arterial sheath was removed and hemostasis was achieved.  The patient was transferred to the unit in a stable condition. Hemodynamic Findings: Heart Rate: 90/minute. LV pressure: 106/11 mmHg, on pull back no gradient was recorded across the aortic valve. Angiographic Findings: Right coronary dominance. LM: There are separate ostia of the left main and the circumflex. LAD: Mild proximal ectasia and minor irregularities without hemodynamic significant disease. LCX: Minor irregularities without hemodynamic significant disease. RCA: Minor irregularities without hemodynamically significant disease. LV: Left ventriculogram performed in 30 SCHULZ projection revealed dilated left ventricle with global hypokinesis and severe left ventricular systolic dysfunction with estimated ejection fraction of 30%.  Complications: No acute procedure related complications.      Results for orders placed during the hospital encounter of 10/30/24    Adult Transthoracic Echo Complete W/ Cont if Necessary Per Protocol    Interpretation Summary    Left ventricular systolic function is moderately decreased. Calculated left ventricular EF = 39.4% Left ventricular ejection fraction appears to be 36 - 40%.    Left ventricular diastolic function was not fully assessed.    The right ventricular cavity is mildly dilated.    The left atrial cavity is mildly dilated.    The right atrial cavity is dilated.    Moderate mitral valve regurgitation is present.    Estimated right ventricular systolic pressure from tricuspid regurgitation is mildly elevated (35-45 mmHg).    No previous echocardiogram available for comparison.      Current medications:  Scheduled Meds:apixaban, 5 mg, Oral, Q12H  aspirin, 81 mg, Oral, Daily  empagliflozin, 10 mg, Oral, Daily  famotidine, 40 mg, Oral, Daily  folic acid, 2 mg, Oral, Daily  furosemide, 40 mg, Intravenous, Daily  levothyroxine, 175 mcg, Oral, Q AM  losartan, 25 mg, Oral,  Nightly  metoprolol succinate XL, 50 mg, Oral, Daily  pharmacy consult - MTM, , Does not apply, Daily  sodium chloride, 10 mL, Intravenous, Q12H      Continuous Infusions:dilTIAZem, 5-15 mg/hr, Last Rate: 5 mg/hr (11/04/24 1147)      PRN Meds:.  acetaminophen **OR** acetaminophen **OR** acetaminophen    benzonatate    senna-docusate sodium **AND** polyethylene glycol **AND** bisacodyl **AND** bisacodyl    Calcium Replacement - Follow Nurse / BPA Driven Protocol    Hydrocod Irvin-Chlorphe Irvin ER    ipratropium-albuterol    Magnesium Standard Dose Replacement - Follow Nurse / BPA Driven Protocol    melatonin    nitroglycerin    ondansetron ODT **OR** ondansetron    Phosphorus Replacement - Follow Nurse / BPA Driven Protocol    Potassium Replacement - Follow Nurse / BPA Driven Protocol    sodium chloride    [COMPLETED] Insert Peripheral IV **AND** sodium chloride    sodium chloride    Assessment & Plan   Assessment & Plan     Active Hospital Problems    Diagnosis  POA    **Atrial fibrillation with RVR [I48.91]  Yes    HFrEF (heart failure with reduced ejection fraction) [I50.20]  Yes    Immunosuppression due to drug therapy [D84.821, Z79.899]  Not Applicable    Rheumatoid arthritis of multiple sites with negative rheumatoid factor [M06.09]  Yes    Benign essential HTN [I10]  Yes    Acquired hypothyroidism [E03.9]  Yes      Resolved Hospital Problems   No resolved problems to display.        Brief Hospital Course to date:  Spenser Pascal is a 64 y.o. male with RA on methotrexate/DMARD, hypertension, hypothyroidism, recently diagnosed cardiomegaly and HFrEF who presented to the ED for chest pain and shortness of breath.  Patient found to be in A-fib with RVR and was found to be RSV positive.    A-fib RVR, new onset  Acute HFrEF (EF ~30%)/NICM  Moderate  Mitral valve regurgitation (on 10/30/24 echo)  Chest pain & dyspnea on exertion, in setting of afib w/ rvr  HTN  -cta chest 11/1/24: no pe, no acute cardio-pulmonary  dz  -troponin neg x 2, EKG without dynamic st changes  -mild atheromatous dz of coronary vessels suggested on cta chest  -echo 10/30/24: LV EF 36-40%, moderate MR  --s/p LHC 11/4/24: minor cad, LV gram shows estimated EF ~30%  -Cards following: asa 81mg, toprol, jardiance, continue iv diltiazem for rate control; continue IV lasix for now; continue losartan; consider adding aldactone prior to discharge. Planning KEE/ECV for tomorrow 11/5/24; will need live vest at discharge. Home when ok w/ cards, likely 1-2 days    Concern for Pneumonia, poa  Acute RSV + bronchitis 11/1/24  -had been initiated on steroids & augmentin/doxy as outpatient due to concern for pneumonia  -rsv + on 11/1/24  -procal negative & normal wbbc on 11/1/24  -ct angio chest negative for acute pulm dz on 11/1/24  -completed 3 days augmentin/doxy prior to admission; completed 5 days of empiric augmentin/doxy 11/3/24  -changed duonebs to prn  -completes prednisone 5mg on 11/4/24    RA on chronic immunosuppressive therapy  - On methotrexate and Orencia weekly injections follows with rheumatology as an outpatient  - Currently on hold; follow up rheumatology as outpatient      Hypothyroidism  - TSH within normal limits  - Continue levothyroxine    JAZMIN  - Does not use CPAP    Amlabs: cbc,bmp,mag      Expected Discharge Location and Transportation: home  Expected Discharge   Expected Discharge Date: 11/5/2024; Expected Discharge Time:      VTE Prophylaxis:  Pharmacologic VTE prophylaxis orders are present.         AM-PAC 6 Clicks Score (PT): 24 (11/04/24 0800)    CODE STATUS:   Code Status and Medical Interventions: CPR (Attempt to Resuscitate); Full Support   Ordered at: 11/01/24 1702     Code Status (Patient has no pulse and is not breathing):    CPR (Attempt to Resuscitate)     Medical Interventions (Patient has pulse or is breathing):    Full Support       Jcarlos Gore MD  11/04/24

## 2024-11-04 NOTE — PROGRESS NOTES
Order received for Phase II Cardiac Rehab. Patient currently in an isolation room, I was not comfortable entering their room due to risk of exposure. I have a new born niece that I do come into contact with often. Cardiac Rehab staff will consult patient at a later time.

## 2024-11-04 NOTE — CONSULTS
Diabetes Education    Patient Name:  Spenser Pascal  YOB: 1960  MRN: [id number]  Admit Date:  11/1/2024      Order criteria not met for diabetes education consult. Current A1c is 5.5, noted during chart review. Pt has no history of DM and no home meds for DM. Thank you.      Electronically signed by:  Molly Hernandez RN  11/04/24 12:59 EST

## 2024-11-05 LAB
ANION GAP SERPL CALCULATED.3IONS-SCNC: 12 MMOL/L (ref 5–15)
BASOPHILS # BLD AUTO: 0.08 10*3/MM3 (ref 0–0.2)
BASOPHILS NFR BLD AUTO: 1 % (ref 0–1.5)
BUN SERPL-MCNC: 22 MG/DL (ref 8–23)
BUN/CREAT SERPL: 22.2 (ref 7–25)
CALCIUM SPEC-SCNC: 9.2 MG/DL (ref 8.6–10.5)
CHLORIDE SERPL-SCNC: 99 MMOL/L (ref 98–107)
CO2 SERPL-SCNC: 28 MMOL/L (ref 22–29)
CREAT SERPL-MCNC: 0.99 MG/DL (ref 0.76–1.27)
DEPRECATED RDW RBC AUTO: 49.2 FL (ref 37–54)
EGFRCR SERPLBLD CKD-EPI 2021: 84 ML/MIN/1.73
EOSINOPHIL # BLD AUTO: 0.23 10*3/MM3 (ref 0–0.4)
EOSINOPHIL NFR BLD AUTO: 2.9 % (ref 0.3–6.2)
ERYTHROCYTE [DISTWIDTH] IN BLOOD BY AUTOMATED COUNT: 14.3 % (ref 12.3–15.4)
GLUCOSE SERPL-MCNC: 83 MG/DL (ref 65–99)
HCT VFR BLD AUTO: 50.6 % (ref 37.5–51)
HGB BLD-MCNC: 16.9 G/DL (ref 13–17.7)
IMM GRANULOCYTES # BLD AUTO: 0.07 10*3/MM3 (ref 0–0.05)
IMM GRANULOCYTES NFR BLD AUTO: 0.9 % (ref 0–0.5)
LYMPHOCYTES # BLD AUTO: 0.9 10*3/MM3 (ref 0.7–3.1)
LYMPHOCYTES NFR BLD AUTO: 11.5 % (ref 19.6–45.3)
MAGNESIUM SERPL-MCNC: 2.3 MG/DL (ref 1.6–2.4)
MCH RBC QN AUTO: 31.5 PG (ref 26.6–33)
MCHC RBC AUTO-ENTMCNC: 33.4 G/DL (ref 31.5–35.7)
MCV RBC AUTO: 94.2 FL (ref 79–97)
MONOCYTES # BLD AUTO: 0.94 10*3/MM3 (ref 0.1–0.9)
MONOCYTES NFR BLD AUTO: 12 % (ref 5–12)
NEUTROPHILS NFR BLD AUTO: 5.61 10*3/MM3 (ref 1.7–7)
NEUTROPHILS NFR BLD AUTO: 71.7 % (ref 42.7–76)
NRBC BLD AUTO-RTO: 0 /100 WBC (ref 0–0.2)
PLATELET # BLD AUTO: 365 10*3/MM3 (ref 140–450)
PMV BLD AUTO: 9.2 FL (ref 6–12)
POTASSIUM SERPL-SCNC: 4.8 MMOL/L (ref 3.5–5.2)
RBC # BLD AUTO: 5.37 10*6/MM3 (ref 4.14–5.8)
SODIUM SERPL-SCNC: 139 MMOL/L (ref 136–145)
WBC NRBC COR # BLD AUTO: 7.83 10*3/MM3 (ref 3.4–10.8)

## 2024-11-05 PROCEDURE — 83735 ASSAY OF MAGNESIUM: CPT | Performed by: INTERNAL MEDICINE

## 2024-11-05 PROCEDURE — 85025 COMPLETE CBC W/AUTO DIFF WBC: CPT | Performed by: INTERNAL MEDICINE

## 2024-11-05 PROCEDURE — 99232 SBSQ HOSP IP/OBS MODERATE 35: CPT | Performed by: PHYSICIAN ASSISTANT

## 2024-11-05 PROCEDURE — 80048 BASIC METABOLIC PNL TOTAL CA: CPT | Performed by: INTERNAL MEDICINE

## 2024-11-05 PROCEDURE — 25010000002 FUROSEMIDE PER 20 MG: Performed by: INTERNAL MEDICINE

## 2024-11-05 PROCEDURE — 99232 SBSQ HOSP IP/OBS MODERATE 35: CPT | Performed by: NURSE PRACTITIONER

## 2024-11-05 RX ORDER — DILTIAZEM HCL 60 MG
30 TABLET ORAL EVERY 8 HOURS SCHEDULED
Status: DISCONTINUED | OUTPATIENT
Start: 2024-11-05 | End: 2024-11-06

## 2024-11-05 RX ADMIN — DILTIAZEM HYDROCHLORIDE 30 MG: 30 TABLET, FILM COATED ORAL at 12:37

## 2024-11-05 RX ADMIN — FUROSEMIDE 40 MG: 10 INJECTION, SOLUTION INTRAMUSCULAR; INTRAVENOUS at 08:39

## 2024-11-05 RX ADMIN — FOLIC ACID 2 MG: 1 TABLET ORAL at 08:39

## 2024-11-05 RX ADMIN — ASPIRIN 81 MG: 81 TABLET, COATED ORAL at 08:39

## 2024-11-05 RX ADMIN — METOPROLOL SUCCINATE 50 MG: 50 TABLET, EXTENDED RELEASE ORAL at 08:39

## 2024-11-05 RX ADMIN — Medication 10 ML: at 20:10

## 2024-11-05 RX ADMIN — LEVOTHYROXINE SODIUM 175 MCG: 25 TABLET ORAL at 05:21

## 2024-11-05 RX ADMIN — DILTIAZEM HYDROCHLORIDE 30 MG: 30 TABLET, FILM COATED ORAL at 20:11

## 2024-11-05 RX ADMIN — Medication 10 ML: at 08:39

## 2024-11-05 RX ADMIN — LOSARTAN POTASSIUM 25 MG: 25 TABLET, FILM COATED ORAL at 20:10

## 2024-11-05 RX ADMIN — HYDROCODONE POLISTIREX AND CHLORPHENIRAMINE POLISTIREX 2.5 ML: 10; 8 SUSPENSION, EXTENDED RELEASE ORAL at 20:23

## 2024-11-05 RX ADMIN — EMPAGLIFLOZIN 10 MG: 10 TABLET, FILM COATED ORAL at 08:39

## 2024-11-05 RX ADMIN — APIXABAN 5 MG: 5 TABLET, FILM COATED ORAL at 20:10

## 2024-11-05 RX ADMIN — APIXABAN 5 MG: 5 TABLET, FILM COATED ORAL at 08:38

## 2024-11-05 RX ADMIN — FAMOTIDINE 40 MG: 20 TABLET, FILM COATED ORAL at 08:38

## 2024-11-05 NOTE — PROGRESS NOTES
[facility] Medicine Services  PROGRESS NOTE    Patient Name: Spenser Pascal  : 1960  MRN: [id number]    Date of Admission: 2024  Primary Care Physician: Jaciel Alex APRN    Subjective   Subjective     CC:  Chest pain, shortness of breath    HPI:  Up in chair. NAD. No pain. Non productive cough, occasional wheezing and soa with cough when having prolonged conversation or ambulating. Overall feels well.       Objective   Objective     Vital Signs:   Temp:  [97.7 °F (36.5 °C)-97.9 °F (36.6 °C)] 97.9 °F (36.6 °C)  Heart Rate:  [] 107  Resp:  [18] 18  BP: ()/(64-95) 127/89     Exam:  Constitutional:Alert, oriented x 3, nontoxic appearing, [place]/ hearing aids in place  Psych:Normal/appropriate affect  HEENT:NCAT, oropharynx clear  Neuro: Face symmetric, speech clear, equal , moves all extremities  Cardiac: irr irr, regular rate; No pretibial pitting edema  Resp: wheezing throughout, normal resp effort  GI: abd soft, nontender  Skin: No extremity rash  Musculoskeletal/extremities: no cyanosis of extremities; no significant ankle edema           Results Reviewed:  LAB RESULTS:      Lab 24  0541 24  1022 24  2215 24  1600 24  0609 24  2201 24  0858 24  0358 24  0001 24  1819   WBC 7.83 6.85  --   --  6.29  --   --  7.52  --  9.22   HEMOGLOBIN 16.9 18.2*  --   --  16.9  --   --  16.1  --  16.3   HEMATOCRIT 50.6 54.6*  --   --  49.9  --   --  48.1  --  48.5   PLATELETS 365 424  --   --  327  --   --  305  --  326   NEUTROS ABS 5.61  --   --   --   --   --   --  5.13  --  6.69   IMMATURE GRANS (ABS) 0.07*  --   --   --   --   --   --  0.07*  --  0.07*   LYMPHS ABS 0.90  --   --   --   --   --   --  1.24  --  1.22   MONOS ABS 0.94*  --   --   --   --   --   --  0.82  --  1.04*   EOS ABS 0.23  --   --   --   --   --   --  0.19  --  0.13   MCV 94.2 95.1  --   --  93.1  --   --  94.5  --  93.6   CRP  --    --   --   --   --   --   --   --   --  2.58*   PROCALCITONIN  --   --   --   --   --   --   --   --   --  0.08   PROTIME  --   --   --   --   --   --   --   --   --  13.9   APTT  --   --   --   --   --   --   --   --   --  31.7*   HEPARIN ANTI-XA  --  0.63 0.51 0.39 0.20* 0.32   < > 0.10*  --   --    HSTROP T  --   --   --   --   --   --   --  19 21 22*    < > = values in this interval not displayed.         Lab 11/05/24  0541 11/04/24  1022 11/03/24  0609 11/02/24  2201 11/02/24  1611 11/02/24  0858 11/01/24  1819   SODIUM 139 140 140  --   --  139 142   POTASSIUM 4.8 4.4 4.2 3.8 3.9 3.7 3.6   CHLORIDE 99 100 102  --   --  101 101   CO2 28.0 24.0 27.0  --   --  26.0 26.0   ANION GAP 12.0 16.0* 11.0  --   --  12.0 15.0   BUN 22 17 21  --   --  18 22   CREATININE 0.99 0.92 0.98  --   --  0.73* 0.86   EGFR 84.0 91.7 85.0  --   --  100.3 95.5   GLUCOSE 83 96 95  --   --  90 94   CALCIUM 9.2 9.3 9.2  --   --  8.7 9.7   MAGNESIUM 2.3 2.1 2.2  --   --  1.9 1.9   PHOSPHORUS  --   --   --   --   --  3.8  --    HEMOGLOBIN A1C  --   --   --   --   --  5.50  --    TSH  --   --   --   --   --   --  1.270         Lab 11/02/24  0858 11/01/24  1819   TOTAL PROTEIN 6.2 7.1   ALBUMIN 4.0 4.3   GLOBULIN 2.2 2.8   ALT (SGPT) 38 47*   AST (SGOT) 25 32   BILIRUBIN 1.6* 1.5*   ALK PHOS 84 91         Lab 11/02/24  0358 11/02/24  0001 11/01/24  1819   PROBNP  --   --  1,569.0*   HSTROP T 19 21 22*   PROTIME  --   --  13.9   INR  --   --  1.05         Lab 11/02/24  0858   CHOLESTEROL 121   LDL CHOL 57   HDL CHOL 50   TRIGLYCERIDES 64             Brief Urine Lab Results       None            Microbiology Results Abnormal       Procedure Component Value - Date/Time    Respiratory Panel PCR w/COVID-19(SARS-CoV-2) VIVIAN/PIETER/URIEL/PAD/COR/TIFFANIE In-House, NP Swab in UTM/VTM, 2 HR TAT - Swab, Nasopharynx [[id number]]  (Abnormal) Collected: 11/01/24 1913    Lab Status: Final result Specimen: Swab from Nasopharynx Updated: 11/01/24 2030     ADENOVIRUS, PCR  Not Detected     Coronavirus 229E Not Detected     Coronavirus HKU1 Not Detected     Coronavirus NL63 Not Detected     Coronavirus OC43 Not Detected     COVID19 Not Detected     Human Metapneumovirus Not Detected     Human Rhinovirus/Enterovirus Not Detected     Influenza A PCR Not Detected     Influenza B PCR Not Detected     Parainfluenza Virus 1 Not Detected     Parainfluenza Virus 2 Not Detected     Parainfluenza Virus 3 Not Detected     Parainfluenza Virus 4 Not Detected     RSV, PCR Detected     Bordetella pertussis pcr Not Detected     Bordetella parapertussis PCR Not Detected     Chlamydophila pneumoniae PCR Not Detected     Mycoplasma pneumo by PCR Not Detected    Narrative:      In the setting of a positive respiratory panel with a viral infection PLUS a negative procalcitonin without other underlying concern for bacterial infection, consider observing off antibiotics or discontinuation of antibiotics and continue supportive care. If the respiratory panel is positive for atypical bacterial infection (Bordetella pertussis, Chlamydophila pneumoniae, or Mycoplasma pneumoniae), consider antibiotic de-escalation to target atypical bacterial infection.            Cardiac Catheterization/Vascular Study    Result Date: 11/4/2024  FINAL     Impression: No evidence of urinary significant coronary disease. Severe left ventricular systolic dysfunction, estimated EF 30%. RECOMMENDATIONS: Optimize medical therapy and risk factor management per guidelines. LifeVest for now with careful monitoring for recovery of LV function and future consideration of ICD in case of persistent LV dysfunction. Indications: Chest pain/cardiomyopathy. Access: Right radial. Procedures: Left heart catheterization. Left ventriculogram. Selective coronary angiography. Arterial site hemostasis with radial band. Procedure narrative: The patient was brought to the catheterization lab in a fasting condition.  Access site was prepped and draped in  standard sterile fashion.  Lidocaine was injected and arterial access was obtained by percutaneous anterior wall puncture technique.  A 6 [place] arterial sheath was placed. Above procedures were performed without complications.  At the conclusion the arterial sheath was removed and hemostasis was achieved.  The patient was transferred to the unit in a stable condition. Hemodynamic Findings: Heart Rate: 90/minute. LV pressure: 106/11 mmHg, on pull back no gradient was recorded across the aortic valve. Angiographic Findings: Right coronary dominance. LM: There are separate ostia of the left main and the circumflex. LAD: Mild proximal ectasia and minor irregularities without hemodynamic significant disease. LCX: Minor irregularities without hemodynamic significant disease. RCA: Minor irregularities without hemodynamically significant disease. LV: Left ventriculogram performed in 30 SCHULZ projection revealed dilated left ventricle with global hypokinesis and severe left ventricular systolic dysfunction with estimated ejection fraction of 30%.  Complications: No acute procedure related complications.      Results for orders placed during the hospital encounter of 10/30/24    Adult Transthoracic Echo Complete W/ Cont if Necessary Per Protocol    Interpretation Summary    Left ventricular systolic function is moderately decreased. Calculated left ventricular EF = 39.4% Left ventricular ejection fraction appears to be 36 - 40%.    Left ventricular diastolic function was not fully assessed.    The right ventricular cavity is mildly dilated.    The left atrial cavity is mildly dilated.    The right atrial cavity is dilated.    Moderate mitral valve regurgitation is present.    Estimated right ventricular systolic pressure from tricuspid regurgitation is mildly elevated (35-45 mmHg).    No previous echocardiogram available for comparison.      Current medications:  Scheduled Meds:apixaban, 5 mg, Oral, Q12H  aspirin, 81 mg, Oral,  Daily  dilTIAZem, 30 mg, Oral, Q8H  empagliflozin, 10 mg, Oral, Daily  famotidine, 40 mg, Oral, Daily  folic acid, 2 mg, Oral, Daily  furosemide, 40 mg, Intravenous, Daily  levothyroxine, 175 mcg, Oral, Q AM  losartan, 25 mg, Oral, Nightly  metoprolol succinate XL, 50 mg, Oral, Daily  sodium chloride, 10 mL, Intravenous, Q12H      Continuous Infusions:dilTIAZem, 5-15 mg/hr, Last Rate: Stopped (11/05/24 1237)      PRN Meds:.  acetaminophen **OR** acetaminophen **OR** acetaminophen    benzonatate    senna-docusate sodium **AND** polyethylene glycol **AND** bisacodyl **AND** bisacodyl    Calcium Replacement - Follow Nurse / BPA Driven Protocol    Hydrocod Irvin-Chlorphe Irvin ER    ipratropium-albuterol    Magnesium Standard Dose Replacement - Follow Nurse / BPA Driven Protocol    melatonin    nitroglycerin    ondansetron ODT **OR** ondansetron    Phosphorus Replacement - Follow Nurse / BPA Driven Protocol    Potassium Replacement - Follow Nurse / BPA Driven Protocol    sodium chloride    [COMPLETED] Insert Peripheral IV **AND** sodium chloride    sodium chloride    Assessment & Plan   Assessment & Plan     Active Hospital Problems    Diagnosis  POA    **Atrial fibrillation with RVR [I48.91]  Yes    HFrEF (heart failure with reduced ejection fraction) [I50.20]  Yes    Immunosuppression due to drug therapy [D84.821, Z79.899]  Not Applicable    Rheumatoid arthritis of multiple sites with negative rheumatoid factor [M06.09]  Yes    Benign essential HTN [I10]  Yes    Acquired hypothyroidism [E03.9]  Yes      Resolved Hospital Problems   No resolved problems to display.        Brief Hospital Course to date:  Spenser Pascal is a 64 y.o. male with RA on methotrexate/DMARD, hypertension, hypothyroidism, recently diagnosed cardiomegaly and HFrEF who presented to the ED for chest pain and shortness of breath.  Patient found to be in A-fib with RVR and was found to be RSV positive.    A-fib RVR, new onset  Acute HFrEF (EF  ~30%)/NICM  Moderate  Mitral valve regurgitation (on 10/30/24 echo)  Chest pain & dyspnea on exertion, in setting of afib w/ rvr  HTN  -cta chest 11/1/24: no pe, no acute cardio-pulmonary dz  -troponin neg x 2, EKG without dynamic st changes  -mild atheromatous dz of coronary vessels suggested on cta chest  -echo 10/30/24: LV EF 36-40%, moderate MR  --s/p LHC 11/4/24: minor cad, LV gram shows estimated EF ~30%  -Cards following: asa 81mg, toprol, jardiance, continue iv diltiazem for rate control; continue IV lasix for now; continue losartan; consider adding aldactone prior to discharge. Planning KEE/ECV on 11/6/24; will need life vest at discharge. Home when ok w/ cards, likely 1-2 days    Concern for Pneumonia, poa  Acute RSV + bronchitis 11/1/24  -had been initiated on steroids & augmentin/doxy as outpatient due to concern for pneumonia  -rsv + on 11/1/24  -procal negative & normal wbbc on 11/1/24  -ct angio chest negative for acute pulm dz on 11/1/24  -completed 3 days augmentin/doxy prior to admission; completed 5 days of empiric augmentin/doxy 11/3/24  -changed duonebs to prn  -completed prednisone 5mg on 11/4/24    RA on chronic immunosuppressive therapy  - On methotrexate and Orencia weekly injections follows with rheumatology as an outpatient  - Currently on hold; follow up rheumatology as outpatient      Hypothyroidism  - TSH within normal limits  - Continue levothyroxine    JAZMIN  - Does not use CPAP      Expected Discharge Location and Transportation: home in 1-2 days   Expected Discharge   Expected Discharge Date: 11/5/2024; Expected Discharge Time:      VTE Prophylaxis:  Pharmacologic VTE prophylaxis orders are present.         AM-PAC 6 Clicks Score (PT): 24 (11/05/24 5848)    CODE STATUS:   Code Status and Medical Interventions: CPR (Attempt to Resuscitate); Full Support   Ordered at: 11/01/24 9067     Code Status (Patient has no pulse and is not breathing):    CPR (Attempt to Resuscitate)     Medical  Interventions (Patient has pulse or is breathing):    Full Support       Jacinda Lloyd, MOON  11/05/24

## 2024-11-05 NOTE — PROGRESS NOTES
"[facility]  IP Progress Note      Chief Complaint: Follow-up of acute HFrEF, cardiomyopathy and atrial fibrillation.    Subjective   Patient sitting up in chair.  Early this morning he was taken off of the Cardizem drip however his heart rate became more elevated and he was placed back on it at 5.  Patient states he is still coughing but feels much better and his shortness of breath has improved.    Objective     Blood pressure 106/83, pulse 70, temperature 97.9 °F (36.6 °C), temperature source Oral, resp. rate 18, height 182.9 cm (72\"), weight 93.8 kg (206 lb 14.4 oz), SpO2 (!) 89%.   No intake or output data in the 24 hours ending 11/05/24 0751      Physical Exam:  General: No apparent distress.  Neck: no JVD.  Chest: No respiratory distress, scattered rhonchi  Cardiovascular: Regular rate, irregular rhythm, no murmur  Extremities: No edema.      Results Review:     I reviewed the patient's new clinical results.    Results from last 7 days   Lab Units 11/05/24  0541   WBC 10*3/mm3 7.83   HEMOGLOBIN g/dL 16.9   HEMATOCRIT % 50.6   PLATELETS 10*3/mm3 365     Results from last 7 days   Lab Units 11/05/24  0541 11/02/24  1611 11/02/24  0858   SODIUM mmol/L 139   < > 139   POTASSIUM mmol/L 4.8   < > 3.7   CHLORIDE mmol/L 99   < > 101   CO2 mmol/L 28.0   < > 26.0   BUN mg/dL 22   < > 18   CREATININE mg/dL 0.99   < > 0.73*   CALCIUM mg/dL 9.2   < > 8.7   BILIRUBIN mg/dL  --   --  1.6*   ALK PHOS U/L  --   --  84   ALT (SGPT) U/L  --   --  38   AST (SGOT) U/L  --   --  25   GLUCOSE mg/dL 83   < > 90    < > = values in this interval not displayed.     Results from last 7 days   Lab Units 11/05/24  0541   SODIUM mmol/L 139   POTASSIUM mmol/L 4.8   CHLORIDE mmol/L 99   CO2 mmol/L 28.0   BUN mg/dL 22   CREATININE mg/dL 0.99   GLUCOSE mg/dL 83   CALCIUM mg/dL 9.2     Results from last 7 days   Lab Units 11/01/24  1819   INR  1.05     Lab Results   Component Value Date    TROPONINT 19 " 11/02/2024     Results from last 7 days   Lab Units 11/02/24  0858 11/01/24  1819   TSH uIU/mL  --  1.270   FREE T4 ng/dL 1.68  --      Results from last 7 days   Lab Units 11/02/24  0858   CHOLESTEROL mg/dL 121   TRIGLYCERIDES mg/dL 64   HDL CHOL mg/dL 50   LDL CHOL mg/dL 57     apixaban, 5 mg, Oral, Q12H  aspirin, 81 mg, Oral, Daily  empagliflozin, 10 mg, Oral, Daily  famotidine, 40 mg, Oral, Daily  folic acid, 2 mg, Oral, Daily  furosemide, 40 mg, Intravenous, Daily  levothyroxine, 175 mcg, Oral, Q AM  losartan, 25 mg, Oral, Nightly  metoprolol succinate XL, 50 mg, Oral, Daily  pharmacy consult - MTM, , Does not apply, Daily  sodium chloride, 10 mL, Intravenous, Q12H       Tele: Atrial Fib with Controlled Rate      Assessment:  Atrial fibrillation with RVR, new onset  Acute HFrEF, EF 36-40% by echo 10/30/2024.  Nonischemic cardiomyopathy, estimated EF 30% by cardiac cath.  Chest pain/dyspnea on exertion, in the setting of afib RVR  Tropnin negative x2, ECG with no acute ST-T changes  Mild atheromatous disease of the coronary vessels on CTA chest  Pulmonary edema vs pneumonia  Acute bronchitis RSV (+), 11/1/2024  VHD/moderate MR  Hypertension  RA on chronic immunosuppressive therapy    Plan:  Due to difficulty scheduling, patient will undergo KEE/ECV tomorrow.  Will make patient n.p.o. after midnight.  Patient is scheduled for this procedure tomorrow at 10:30 AM.  Will start patient on oral dose of diltiazem 30 mg every 8 hours along with the metoprolol to see if heart rate can be better managed off of IV diltiazem.  Continue current GDMT of metoprolol succinate 50 mg daily, Jardiance 10 mg daily and losartan 25 mg nightly.  Blood pressure at this time will not allow for the addition of spironolactone.  Continue Eliquis for stroke prophylaxis.  Discussed plan with nurse.  Patient can get up and take a shower as long as he is off the diltiazem drip.  Likely discharge tomorrow after KEE/ECV.  Patient will have  LifeVest placed tomorrow before discharge.    Electronically signed by Gin Ling PA-C, 11/05/24, 10:44 AM EST.

## 2024-11-06 ENCOUNTER — ANESTHESIA EVENT (OUTPATIENT)
Dept: CARDIOLOGY | Facility: HOSPITAL | Age: 64
End: 2024-11-06
Payer: COMMERCIAL

## 2024-11-06 ENCOUNTER — ANESTHESIA (OUTPATIENT)
Dept: CARDIOLOGY | Facility: HOSPITAL | Age: 64
End: 2024-11-06
Payer: COMMERCIAL

## 2024-11-06 ENCOUNTER — READMISSION MANAGEMENT (OUTPATIENT)
Dept: CALL CENTER | Facility: HOSPITAL | Age: 64
End: 2024-11-06
Payer: COMMERCIAL

## 2024-11-06 ENCOUNTER — APPOINTMENT (OUTPATIENT)
Dept: CARDIOLOGY | Facility: HOSPITAL | Age: 64
End: 2024-11-06
Payer: COMMERCIAL

## 2024-11-06 VITALS
OXYGEN SATURATION: 93 % | TEMPERATURE: 97.9 F | WEIGHT: 207.01 LBS | HEART RATE: 86 BPM | DIASTOLIC BLOOD PRESSURE: 93 MMHG | SYSTOLIC BLOOD PRESSURE: 122 MMHG | RESPIRATION RATE: 17 BRPM | HEIGHT: 72 IN | BODY MASS INDEX: 28.04 KG/M2

## 2024-11-06 LAB
ANION GAP SERPL CALCULATED.3IONS-SCNC: 10 MMOL/L (ref 5–15)
BUN SERPL-MCNC: 23 MG/DL (ref 8–23)
BUN/CREAT SERPL: 24.5 (ref 7–25)
CALCIUM SPEC-SCNC: 9.3 MG/DL (ref 8.6–10.5)
CHLORIDE SERPL-SCNC: 99 MMOL/L (ref 98–107)
CO2 SERPL-SCNC: 28 MMOL/L (ref 22–29)
CREAT SERPL-MCNC: 0.94 MG/DL (ref 0.76–1.27)
EGFRCR SERPLBLD CKD-EPI 2021: 89.4 ML/MIN/1.73
GLUCOSE SERPL-MCNC: 96 MG/DL (ref 65–99)
POTASSIUM SERPL-SCNC: 4.4 MMOL/L (ref 3.5–5.2)
SODIUM SERPL-SCNC: 137 MMOL/L (ref 136–145)

## 2024-11-06 PROCEDURE — 99232 SBSQ HOSP IP/OBS MODERATE 35: CPT | Performed by: INTERNAL MEDICINE

## 2024-11-06 PROCEDURE — 93325 DOPPLER ECHO COLOR FLOW MAPG: CPT

## 2024-11-06 PROCEDURE — 99239 HOSP IP/OBS DSCHRG MGMT >30: CPT | Performed by: NURSE PRACTITIONER

## 2024-11-06 PROCEDURE — 5A2204Z RESTORATION OF CARDIAC RHYTHM, SINGLE: ICD-10-PCS | Performed by: INTERNAL MEDICINE

## 2024-11-06 PROCEDURE — 93005 ELECTROCARDIOGRAM TRACING: CPT | Performed by: INTERNAL MEDICINE

## 2024-11-06 PROCEDURE — 93325 DOPPLER ECHO COLOR FLOW MAPG: CPT | Performed by: INTERNAL MEDICINE

## 2024-11-06 PROCEDURE — 25010000002 FUROSEMIDE PER 20 MG: Performed by: INTERNAL MEDICINE

## 2024-11-06 PROCEDURE — 93321 DOPPLER ECHO F-UP/LMTD STD: CPT

## 2024-11-06 PROCEDURE — 93321 DOPPLER ECHO F-UP/LMTD STD: CPT | Performed by: INTERNAL MEDICINE

## 2024-11-06 PROCEDURE — 80048 BASIC METABOLIC PNL TOTAL CA: CPT | Performed by: INTERNAL MEDICINE

## 2024-11-06 PROCEDURE — 93312 ECHO TRANSESOPHAGEAL: CPT | Performed by: INTERNAL MEDICINE

## 2024-11-06 PROCEDURE — 92960 CARDIOVERSION ELECTRIC EXT: CPT

## 2024-11-06 PROCEDURE — 93312 ECHO TRANSESOPHAGEAL: CPT

## 2024-11-06 PROCEDURE — 92960 CARDIOVERSION ELECTRIC EXT: CPT | Performed by: INTERNAL MEDICINE

## 2024-11-06 RX ORDER — METOPROLOL SUCCINATE 50 MG/1
50 TABLET, EXTENDED RELEASE ORAL DAILY
Qty: 30 TABLET | Refills: 0 | Status: SHIPPED | OUTPATIENT
Start: 2024-11-07

## 2024-11-06 RX ORDER — SPIRONOLACTONE 25 MG/1
25 TABLET ORAL DAILY
Status: DISCONTINUED | OUTPATIENT
Start: 2024-11-06 | End: 2024-11-06 | Stop reason: HOSPADM

## 2024-11-06 RX ORDER — SPIRONOLACTONE 25 MG/1
25 TABLET ORAL DAILY
Qty: 30 TABLET | Refills: 0 | Status: SHIPPED | OUTPATIENT
Start: 2024-11-07

## 2024-11-06 RX ORDER — LOSARTAN POTASSIUM 25 MG/1
25 TABLET ORAL NIGHTLY
Qty: 30 TABLET | Refills: 0 | Status: SHIPPED | OUTPATIENT
Start: 2024-11-06

## 2024-11-06 RX ORDER — ASPIRIN 81 MG/1
81 TABLET ORAL DAILY
Qty: 30 TABLET | Refills: 0 | Status: SHIPPED | OUTPATIENT
Start: 2024-11-07

## 2024-11-06 RX ORDER — FUROSEMIDE 40 MG/1
40 TABLET ORAL DAILY
Status: DISCONTINUED | OUTPATIENT
Start: 2024-11-07 | End: 2024-11-06 | Stop reason: HOSPADM

## 2024-11-06 RX ORDER — FUROSEMIDE 40 MG/1
40 TABLET ORAL DAILY
Qty: 30 TABLET | Refills: 0 | Status: SHIPPED | OUTPATIENT
Start: 2024-11-07

## 2024-11-06 RX ADMIN — Medication 10 ML: at 08:50

## 2024-11-06 RX ADMIN — FUROSEMIDE 40 MG: 10 INJECTION, SOLUTION INTRAMUSCULAR; INTRAVENOUS at 08:50

## 2024-11-06 RX ADMIN — METOPROLOL SUCCINATE 50 MG: 50 TABLET, EXTENDED RELEASE ORAL at 08:51

## 2024-11-06 RX ADMIN — SPIRONOLACTONE 25 MG: 25 TABLET ORAL at 13:21

## 2024-11-06 RX ADMIN — DILTIAZEM HYDROCHLORIDE 30 MG: 30 TABLET, FILM COATED ORAL at 05:18

## 2024-11-06 RX ADMIN — LEVOTHYROXINE SODIUM 175 MCG: 25 TABLET ORAL at 05:18

## 2024-11-06 RX ADMIN — FOLIC ACID 2 MG: 1 TABLET ORAL at 08:50

## 2024-11-06 RX ADMIN — FAMOTIDINE 40 MG: 20 TABLET, FILM COATED ORAL at 08:51

## 2024-11-06 RX ADMIN — APIXABAN 5 MG: 5 TABLET, FILM COATED ORAL at 08:51

## 2024-11-06 RX ADMIN — EMPAGLIFLOZIN 10 MG: 10 TABLET, FILM COATED ORAL at 08:51

## 2024-11-06 RX ADMIN — HYDROCODONE POLISTIREX AND CHLORPHENIRAMINE POLISTIREX 2.5 ML: 10; 8 SUSPENSION, EXTENDED RELEASE ORAL at 13:21

## 2024-11-06 RX ADMIN — ASPIRIN 81 MG: 81 TABLET, COATED ORAL at 08:51

## 2024-11-06 NOTE — PLAN OF CARE
Problem: Adult Inpatient Plan of Care  Goal: Plan of Care Review  Outcome: Progressing  Goal: Patient-Specific Goal (Individualized)  Outcome: Progressing  Goal: Absence of Hospital-Acquired Illness or Injury  Outcome: Progressing  Intervention: Identify and Manage Fall Risk  Recent Flowsheet Documentation  Taken 11/6/2024 0400 by Suha Mcbride RN  Safety Promotion/Fall Prevention: safety round/check completed  Taken 11/6/2024 0200 by Suha Mcbride RN  Safety Promotion/Fall Prevention: safety round/check completed  Taken 11/6/2024 0000 by Suha Mcbride RN  Safety Promotion/Fall Prevention: safety round/check completed  Taken 11/5/2024 2200 by Suha Mcbride RN  Safety Promotion/Fall Prevention:   safety round/check completed   nonskid shoes/slippers when out of bed   clutter free environment maintained  Taken 11/5/2024 2000 by Suha Mcbride RN  Safety Promotion/Fall Prevention:   clutter free environment maintained   fall prevention program maintained   nonskid shoes/slippers when out of bed   room organization consistent   safety round/check completed   lighting adjusted  Intervention: Prevent Skin Injury  Recent Flowsheet Documentation  Taken 11/6/2024 0400 by Suha Mcbride RN  Body Position: position changed independently  Taken 11/6/2024 0200 by Suha Mcbride RN  Body Position: position changed independently  Taken 11/6/2024 0000 by Suha Mcbride RN  Body Position: position changed independently  Taken 11/5/2024 2000 by Suha Mcbride RN  Body Position: position changed independently  Intervention: Prevent Infection  Recent Flowsheet Documentation  Taken 11/5/2024 2000 by Suha Mcbride RN  Infection Prevention:   hand hygiene promoted   personal protective equipment utilized   rest/sleep promoted   single patient room provided  Goal: Optimal Comfort and Wellbeing  Outcome: Progressing  Goal: Readiness for Transition of Care  Outcome: Progressing     Problem: Fall Injury  Risk  Goal: Absence of Fall and Fall-Related Injury  Outcome: Progressing  Intervention: Promote Injury-Free Environment  Recent Flowsheet Documentation  Taken 11/6/2024 0400 by Suha Mcbride RN  Safety Promotion/Fall Prevention: safety round/check completed  Taken 11/6/2024 0200 by Suha Mcbride RN  Safety Promotion/Fall Prevention: safety round/check completed  Taken 11/6/2024 0000 by Suha Mcbride, RN  Safety Promotion/Fall Prevention: safety round/check completed  Taken 11/5/2024 2200 by Suha Mcbride RN  Safety Promotion/Fall Prevention:   safety round/check completed   nonskid shoes/slippers when out of bed   clutter free environment maintained  Taken 11/5/2024 2000 by Suha Mcbride, RN  Safety Promotion/Fall Prevention:   clutter free environment maintained   fall prevention program maintained   nonskid shoes/slippers when out of bed   room organization consistent   safety round/check completed   lighting adjusted   Goal Outcome Evaluation:

## 2024-11-06 NOTE — PLAN OF CARE
Goal Outcome Evaluation:  Plan of Care Reviewed With: patient        Progress: improving  Outcome Evaluation: Patient VSS at dc, remains in SR, life vest placed before dc, patient discharge eduation and follow up appts reviewed at bedside, dcd home with spouse

## 2024-11-06 NOTE — PROGRESS NOTES
"[facility]  IP Progress Note      Chief Complaint: Follow-up of acute HFrEF/cardiomyopathy/atrial fibrillation    Subjective   Stable overnight, continues to have cough without expectoration.  No chest pain or shortness of breath    Objective     Blood pressure 102/82, pulse 78, temperature 97.8 °F (36.6 °C), temperature source Temporal, resp. rate 15, height 182.9 cm (72\"), weight 93.9 kg (207 lb 0.2 oz), SpO2 90%.   No intake or output data in the 24 hours ending 11/06/24 1207    Physical Exam:  General: No acute distress.   Neck: no JVD.  Chest:No respiratory distress, breath sounds are equal, scattered rhonchi.  Cardiovascular: Normal S1 and S2, no murmur, gallop or rub.    Extremities: No edema.    Results Review:     I reviewed the patient's new clinical results.    Results from last 7 days   Lab Units 11/05/24  0541   WBC 10*3/mm3 7.83   HEMOGLOBIN g/dL 16.9   HEMATOCRIT % 50.6   PLATELETS 10*3/mm3 365     Results from last 7 days   Lab Units 11/06/24  0528 11/02/24  1611 11/02/24  0858   SODIUM mmol/L 137   < > 139   POTASSIUM mmol/L 4.4   < > 3.7   CHLORIDE mmol/L 99   < > 101   CO2 mmol/L 28.0   < > 26.0   BUN mg/dL 23   < > 18   CREATININE mg/dL 0.94   < > 0.73*   CALCIUM mg/dL 9.3   < > 8.7   BILIRUBIN mg/dL  --   --  1.6*   ALK PHOS U/L  --   --  84   ALT (SGPT) U/L  --   --  38   AST (SGOT) U/L  --   --  25   GLUCOSE mg/dL 96   < > 90    < > = values in this interval not displayed.     Results from last 7 days   Lab Units 11/06/24  0528   SODIUM mmol/L 137   POTASSIUM mmol/L 4.4   CHLORIDE mmol/L 99   CO2 mmol/L 28.0   BUN mg/dL 23   CREATININE mg/dL 0.94   GLUCOSE mg/dL 96   CALCIUM mg/dL 9.3     Results from last 7 days   Lab Units 11/01/24  1819   INR  1.05     Lab Results   Component Value Date    TROPONINT 19 11/02/2024     Results from last 7 days   Lab Units 11/02/24  0858 11/01/24  1819   TSH uIU/mL  --  1.270   FREE T4 ng/dL 1.68  --      Results from last " 7 days   Lab Units 11/02/24  0858   CHOLESTEROL mg/dL 121   TRIGLYCERIDES mg/dL 64   HDL CHOL mg/dL 50   LDL CHOL mg/dL 57     apixaban, 5 mg, Oral, Q12H  aspirin, 81 mg, Oral, Daily  empagliflozin, 10 mg, Oral, Daily  famotidine, 40 mg, Oral, Daily  folic acid, 2 mg, Oral, Daily  furosemide, 40 mg, Intravenous, Daily  levothyroxine, 175 mcg, Oral, Q AM  losartan, 25 mg, Oral, Nightly  metoprolol succinate XL, 50 mg, Oral, Daily  sodium chloride, 10 mL, Intravenous, Q12H       Tele: Sinus Rythym      Assessment:  New onset atrial fibrillation with RVR, now status post ECV and back in sinus rhythm.  Acute HFrEF, EF 30%.  Nonischemic cardiomyopathy, normal coronaries.  Pneumonia.  Acute RSV positive bronchitis.  Moderate mitral regurgitation noted.  On echo, no significant mitral regurgitation by KEE.  Hypertension.  Rheumatoid arthritis, on immunosuppressive therapy.    Plan:  Continue current GDMT with Toprol-XL, losartan, Jardiance and Eliquis.  No need for statin therapy, his lipid panel is normal and he does not have significant CAD.  Change Lasix to p.o., add spironolactone 25 mg daily.  Blood pressure too low for Entresto at this time however it can be considered as outpatient.  Discharge to home anytime okay with us.  LifeVest on discharge.  He will need close follow-up in CHF clinic for continuous optimization of GDMT and can follow-up with me in 4 to 6 weeks.    Rebeka Franz MD, FACC, [facility]

## 2024-11-06 NOTE — ANESTHESIA PREPROCEDURE EVALUATION
Anesthesia Evaluation     Patient summary reviewed and Nursing notes reviewed   NPO Solid Status: > 8 hours  NPO Liquid Status: > 2 hours           Airway   Mallampati: II  TM distance: >3 FB  Neck ROM: full  No difficulty expected  Dental      Pulmonary    (+) ,sleep apnea ([place])  (-) asthma, not a smoker    ROS comment: Pulmonary edema vs pneumonia (10/28 xray)  RSV + (11/1/2024)    Cardiovascular     ECG reviewed    (+) hypertension, dysrhythmias Atrial Fib  (-) past MI, CAD, angina, cardiac stents    ROS comment: ECG A fib Biventricular hypertrophy  IMI prol QT     EF>36 % LA RA RV dilated MR( mod ) RVSP >35     CATH 11/4 No significant CAD . EF 30%.             Neuro/Psych  (-) seizures, CVA  GI/Hepatic/Renal/Endo    (+) thyroid problem hypothyroidism  (-) diabetes    Musculoskeletal     Abdominal    Substance History      OB/GYN          Other   arthritis (RHEUMATOID),     ROS/Med Hx Other: Hospitalised for A Fib RVR   Recent RSV infection diagnosed   Immunosurpressed                 Anesthesia Plan    ASA 4     MAC     (TOP POM - PFL light sedation   LOW EF   presssure support c wilman as needed to keep MAP >70 )  intravenous induction     Anesthetic plan, risks, benefits, and alternatives have been provided, discussed and informed consent has been obtained with: patient.    Plan discussed with CRNA.    CODE STATUS:    Code Status (Patient has no pulse and is not breathing): CPR (Attempt to Resuscitate)  Medical Interventions (Patient has pulse or is breathing): Full Support

## 2024-11-06 NOTE — CASE MANAGEMENT/SOCIAL WORK
Case Management Discharge Note      Final Note: Spoke with patient at bedside. Patient states his plans are to discharge home today, 11/6. Denied having any discharge needs or concerns at this time. Family will transport home.         Selected Continued Care - Admitted Since 11/1/2024       Destination    No services have been selected for the patient.                Durable Medical Equipment    No services have been selected for the patient.                Dialysis/Infusion    No services have been selected for the patient.                Home Medical Care    No services have been selected for the patient.                Therapy    No services have been selected for the patient.                Community Resources    No services have been selected for the patient.                Community & DME    No services have been selected for the patient.                         Final Discharge Disposition Code: 01 - home or self-care

## 2024-11-06 NOTE — DISCHARGE SUMMARY
[facility] Medicine Services  DISCHARGE SUMMARY    Patient Name: Spenser Pascal  : 1960  MRN: [id number]    Date of Admission: 2024  6:03 PM  Date of Discharge:  2024  Primary Care Physician: Jaciel Alex APRN    Consults       Date and Time Order Name Status Description    2024  7:45 AM Inpatient Cardiology Consult Completed             Hospital Course       Active Hospital Problems    Diagnosis  POA    **Atrial fibrillation with RVR [I48.91]  Yes    HFrEF (heart failure with reduced ejection fraction) [I50.20]  Yes    Immunosuppression due to drug therapy [D84.821, Z79.899]  Not Applicable    Rheumatoid arthritis of multiple sites with negative rheumatoid factor [M06.09]  Yes    Benign essential HTN [I10]  Yes    Acquired hypothyroidism [E03.9]  Yes      Resolved Hospital Problems   No resolved problems to display.          Hospital Course:  Spenser Pascal is a 64 y.o. male   with RA on methotrexate/DMARD, hypertension, hypothyroidism, recently diagnosed cardiomegaly and HFrEF who presented to the ED for chest pain and shortness of breath.  Patient found to be in A-fib with RVR and was found to be RSV positive.     A-fib RVR, new onset  Acute HFrEF (EF ~30%)/NICM  Moderate  Mitral valve regurgitation (on 10/30/24 echo)  Chest pain & dyspnea on exertion, in setting of afib w/ rvr  HTN  -cta chest 24: no pe, no acute cardio-pulmonary dz  -troponin neg x 2, EKG without dynamic st changes  -mild atheromatous dz of coronary vessels suggested on cta chest  -echo 10/30/24: LV EF 36-40%, moderate MR  --s/p LHC 24: minor cad, LV gram shows estimated EF ~30%  -Cards following: asa 81mg, toprol, jardiance, aldactone, losartan, lasix. Blood pressures could not tolerate Entresto yet. KEE/ECV on 24; life vest at discharge. Home today     Concern for Pneumonia, poa  Acute RSV + bronchitis 24  -had been initiated on steroids & augmentin/doxy  as outpatient due to concern for pneumonia  -rsv + on 11/1/24  -procal negative & normal wbc on 11/1/24  -ct angio chest negative for acute pulm dz on 11/1/24  -completed 3 days augmentin/doxy prior to admission; completed 5 days of empiric augmentin/doxy 11/3/24  -changed duonebs to prn  -completed prednisone 5mg on 11/4/24     RA on chronic immunosuppressive therapy  - On methotrexate and Orencia weekly injections follows with rheumatology as an outpatient  - Currently on hold; follow up rheumatology as outpatient        Hypothyroidism  - TSH within normal limits  - Continue levothyroxine     JAZMIN  - Does not use CPAP       Discharge Follow Up Recommendations for outpatient labs/diagnostics:   PCP 1 week   Heart and Valve clinic 1-2 week  Dr Franz 1 month     Day of Discharge     HPI:   Feels well. Awaiting KEE/ ECV. No pain. No palpitations. No chest pain. Cough improved. Not as much SOA with ambulation. On RA. Eager to go home.       Vital Signs:   Temp:  [96.3 °F (35.7 °C)-97.8 °F (36.6 °C)] 97.8 °F (36.6 °C)  Heart Rate:  [] 83  Resp:  [15-29] 16  BP: ()/(66-98) 113/90      Physical Exam:  Constitutional:Alert, oriented x 3, nontoxic appearing, [place]/ hearing aids in place up in chair   Psych:Normal/appropriate affect  HEENT:NCAT, oropharynx clear  Neuro: Face symmetric, speech clear, equal , moves all extremities  Cardiac: irr irr, regular rate; No pretibial pitting edema  Resp: mild wheezing throughout, normal resp effort  GI: abd soft, nontender  Skin: No extremity rash  Musculoskeletal/extremities: no cyanosis of extremities; no significant ankle jhoan    Pertinent  and/or Most Recent Results     LAB RESULTS:      Lab 11/05/24  0541 11/04/24  1022 11/03/24  2215 11/03/24  1600 11/03/24  0609 11/02/24  2201 11/02/24  0858 11/02/24  0358 11/01/24  1819   WBC 7.83 6.85  --   --  6.29  --   --  7.52 9.22   HEMOGLOBIN 16.9 18.2*  --   --  16.9  --   --  16.1 16.3   HEMATOCRIT 50.6 54.6*  --   --   49.9  --   --  48.1 48.5   PLATELETS 365 424  --   --  327  --   --  305 326   NEUTROS ABS 5.61  --   --   --   --   --   --  5.13 6.69   IMMATURE GRANS (ABS) 0.07*  --   --   --   --   --   --  0.07* 0.07*   LYMPHS ABS 0.90  --   --   --   --   --   --  1.24 1.22   MONOS ABS 0.94*  --   --   --   --   --   --  0.82 1.04*   EOS ABS 0.23  --   --   --   --   --   --  0.19 0.13   MCV 94.2 95.1  --   --  93.1  --   --  94.5 93.6   CRP  --   --   --   --   --   --   --   --  2.58*   PROCALCITONIN  --   --   --   --   --   --   --   --  0.08   PROTIME  --   --   --   --   --   --   --   --  13.9   APTT  --   --   --   --   --   --   --   --  31.7*   HEPARIN ANTI-XA  --  0.63 0.51 0.39 0.20* 0.32   < > 0.10*  --     < > = values in this interval not displayed.         Lab 11/06/24  0528 11/05/24  0541 11/04/24  1022 11/03/24  0609 11/02/24  2201 11/02/24  1611 11/02/24  0858 11/01/24  1819   SODIUM 137 139 140 140  --   --  139 142   POTASSIUM 4.4 4.8 4.4 4.2 3.8   < > 3.7 3.6   CHLORIDE 99 99 100 102  --   --  101 101   CO2 28.0 28.0 24.0 27.0  --   --  26.0 26.0   ANION GAP 10.0 12.0 16.0* 11.0  --   --  12.0 15.0   BUN 23 22 17 21  --   --  18 22   CREATININE 0.94 0.99 0.92 0.98  --   --  0.73* 0.86   EGFR 89.4 84.0 91.7 85.0  --   --  100.3 95.5   GLUCOSE 96 83 96 95  --   --  90 94   CALCIUM 9.3 9.2 9.3 9.2  --   --  8.7 9.7   MAGNESIUM  --  2.3 2.1 2.2  --   --  1.9 1.9   PHOSPHORUS  --   --   --   --   --   --  3.8  --    HEMOGLOBIN A1C  --   --   --   --   --   --  5.50  --    TSH  --   --   --   --   --   --   --  1.270    < > = values in this interval not displayed.         Lab 11/02/24  0858 11/01/24  1819   TOTAL PROTEIN 6.2 7.1   ALBUMIN 4.0 4.3   GLOBULIN 2.2 2.8   ALT (SGPT) 38 47*   AST (SGOT) 25 32   BILIRUBIN 1.6* 1.5*   ALK PHOS 84 91         Lab 11/02/24  0358 11/02/24  0001 11/01/24  1819   PROBNP  --   --  1,569.0*   HSTROP T 19 21 22*   PROTIME  --   --  13.9   INR  --   --  1.05         Lab  11/02/24  0858   CHOLESTEROL 121   LDL CHOL 57   HDL CHOL 50   TRIGLYCERIDES 64             Brief Urine Lab Results       None          Microbiology Results (last 10 days)       Procedure Component Value - Date/Time    Respiratory Panel PCR w/COVID-19(SARS-CoV-2) VIVIAN/PIETER/URIEL/PAD/COR/TIFFANIE In-House, NP Swab in UTM/VTM, 2 HR TAT - Swab, Nasopharynx [[id number]]  (Abnormal) Collected: 11/01/24 1913    Lab Status: Final result Specimen: Swab from Nasopharynx Updated: 11/01/24 2030     ADENOVIRUS, PCR Not Detected     Coronavirus 229E Not Detected     Coronavirus HKU1 Not Detected     Coronavirus NL63 Not Detected     Coronavirus OC43 Not Detected     COVID19 Not Detected     Human Metapneumovirus Not Detected     Human Rhinovirus/Enterovirus Not Detected     Influenza A PCR Not Detected     Influenza B PCR Not Detected     Parainfluenza Virus 1 Not Detected     Parainfluenza Virus 2 Not Detected     Parainfluenza Virus 3 Not Detected     Parainfluenza Virus 4 Not Detected     RSV, PCR Detected     Bordetella pertussis pcr Not Detected     Bordetella parapertussis PCR Not Detected     Chlamydophila pneumoniae PCR Not Detected     Mycoplasma pneumo by PCR Not Detected    Narrative:      In the setting of a positive respiratory panel with a viral infection PLUS a negative procalcitonin without other underlying concern for bacterial infection, consider observing off antibiotics or discontinuation of antibiotics and continue supportive care. If the respiratory panel is positive for atypical bacterial infection (Bordetella pertussis, Chlamydophila pneumoniae, or Mycoplasma pneumoniae), consider antibiotic de-escalation to target atypical bacterial infection.            Cardioversion External in Cardiology Department    Result Date: 11/6/2024    The cardioversion was successful.     Cardiac Catheterization/Vascular Study    Result Date: 11/4/2024  FINAL     No evidence of urinary significant coronary disease. Severe left  ventricular systolic dysfunction, estimated EF 30%. RECOMMENDATIONS: Optimize medical therapy and risk factor management per guidelines. LifeVest for now with careful monitoring for recovery of LV function and future consideration of ICD in case of persistent LV dysfunction. Indications: Chest pain/cardiomyopathy. Access: Right radial. Procedures: Left heart catheterization. Left ventriculogram. Selective coronary angiography. Arterial site hemostasis with radial band. Procedure narrative: The patient was brought to the catheterization lab in a fasting condition.  Access site was prepped and draped in standard sterile fashion.  Lidocaine was injected and arterial access was obtained by percutaneous anterior wall puncture technique.  A 6 [place] arterial sheath was placed. Above procedures were performed without complications.  At the conclusion the arterial sheath was removed and hemostasis was achieved.  The patient was transferred to the unit in a stable condition. Hemodynamic Findings: Heart Rate: 90/minute. LV pressure: 106/11 mmHg, on pull back no gradient was recorded across the aortic valve. Angiographic Findings: Right coronary dominance. LM: There are separate ostia of the left main and the circumflex. LAD: Mild proximal ectasia and minor irregularities without hemodynamic significant disease. LCX: Minor irregularities without hemodynamic significant disease. RCA: Minor irregularities without hemodynamically significant disease. LV: Left ventriculogram performed in 30 SCHULZ projection revealed dilated left ventricle with global hypokinesis and severe left ventricular systolic dysfunction with estimated ejection fraction of 30%.  Complications: No acute procedure related complications.     CT Angiogram Chest Pulmonary Embolism    Result Date: 11/1/2024  CT ANGIOGRAM CHEST PULMONARY EMBOLISM Date of Exam: 11/1/2024 7:18 PM EDT Indication: sob. Comparison: None available. Technique: Axial CT images were obtained  of the chest after the uneventful intravenous administration of 85 cc Isovue-370 IV contrast utilizing pulmonary embolism protocol.  Reconstructed coronal and sagittal images were also obtained. Automated exposure  control and iterative construction methods were used. Findings: The thyroid gland is within normal limits. The subglottic airway is clear. No consolidation. No pneumothorax or pleural effusion. No pulmonary embolus. No evidence of aortic dissection. Mild atheromatous disease of the coronary vessels. Cholelithiasis without evidence of cholecystitis. Splenic capsular calcifications. Stable parenchymal thoracic vertebral body height and alignment are normal. No lytic or blastic disease. The posterior elements are intact. No rib fractures.     No pulmonary embolus. No acute cardiopulmonary disease. Electronically Signed: Ciro Weiner MD  11/1/2024 7:36 PM EDT  Workstation ID: [id number]    XR Chest 1 View    Result Date: 11/1/2024  XR CHEST 1 VW Date of Exam: 11/1/2024 6:26 PM EDT Indication: SOA triage protocol Comparison: 10/28/2024 Findings: No focal consolidation. No pneumothorax or pleural effusion. Cardiomegaly. The visualized clavicles appear intact. No displaced rib fractures. The visualized upper abdomen is normal.     Impression: No acute cardiopulmonary disease. Electronically Signed: Ciro Weiner MD  11/1/2024 6:52 PM EDT  Workstation ID: [id number]    XR Chest PA & Lateral    Result Date: 10/28/2024  XR CHEST PA AND LATERAL Date of Exam: 10/28/2024 12:18 PM EDT Indication: cough, wheeze, soa, immunosuppressed Comparison: 10/18/2024 Findings: Prior study report from 10/19/2024 describes no acute process. On today's exam the heart appears larger and the vasculature is now cephalized. Increased perihilar interstitial disease may represent pulmonary interstitial edema and there now appears to be  a minimal right pleural effusion as well. No dense lung consolidation or pneumothorax is seen.. Patient's  pulmonary disease has a somewhat reticulonodular quality, and a superimposed infectious process cannot be excluded.     Impression: 1. Cardiomegaly and new, mild to moderate pulmonary vascular congestion. Minimal new right pleural effusion. 2. Diffuse perihilar disease pattern, which may all represent pulmonary interstitial edema. Possibility of superimposed pneumonia, possibly atypical pneumonia, should be considered, however. Electronically Signed: Margarito Noonan MD  10/28/2024 12:33 PM EDT  Workstation ID: [id number]             Results for orders placed during the hospital encounter of 10/30/24    Adult Transthoracic Echo Complete W/ Cont if Necessary Per Protocol    Interpretation Summary    Left ventricular systolic function is moderately decreased. Calculated left ventricular EF = 39.4% Left ventricular ejection fraction appears to be 36 - 40%.    Left ventricular diastolic function was not fully assessed.    The right ventricular cavity is mildly dilated.    The left atrial cavity is mildly dilated.    The right atrial cavity is dilated.    Moderate mitral valve regurgitation is present.    Estimated right ventricular systolic pressure from tricuspid regurgitation is mildly elevated (35-45 mmHg).    No previous echocardiogram available for comparison.      Discharge Details        Discharge Medications        New Medications        Instructions Start Date   apixaban 5 MG tablet tablet  Commonly known as: ELIQUIS   5 mg, Oral, Every 12 Hours Scheduled      aspirin 81 MG EC tablet   81 mg, Oral, Daily   Start Date: November 7, 2024     empagliflozin 10 MG tablet tablet  Commonly known as: JARDIANCE   10 mg, Oral, Daily   Start Date: November 7, 2024     losartan 25 MG tablet  Commonly known as: COZAAR   25 mg, Oral, Nightly      spironolactone 25 MG tablet  Commonly known as: ALDACTONE   25 mg, Oral, Daily   Start Date: November 7, 2024            Changes to Medications        Instructions Start Date   furosemide 40  MG tablet  Commonly known as: LASIX  What changed: when to take this   40 mg, Oral, Daily   Start Date: November 7, 2024     metoprolol succinate XL 50 MG 24 hr tablet  Commonly known as: TOPROL-XL  What changed:   medication strength  how much to take   50 mg, Oral, Daily   Start Date: November 7, 2024            Continue These Medications        Instructions Start Date   benzonatate 100 MG capsule  Commonly known as: Tessalon Perles   100 mg, Oral, 3 Times Daily PRN      folic acid 1 MG tablet  Commonly known as: FOLVITE   2 mg, Oral, Daily      levothyroxine 175 MCG tablet  Commonly known as: SYNTHROID, LEVOTHROID   175 mcg, Oral, Daily      methotrexate PF 50 MG/2ML chemo syringe   20 mg, Subcutaneous, Weekly      Orencia ClickJect 125 MG/ML solution auto-injector  Generic drug: Abatacept   125 mg, Subcutaneous, Weekly             Stop These Medications      amoxicillin-clavulanate 875-125 MG per tablet  Commonly known as: AUGMENTIN     doxycycline 100 MG capsule  Commonly known as: VIBRAMYCIN     lisinopril 10 MG tablet  Commonly known as: PRINIVIL,ZESTRIL              No Known Allergies      Discharge Disposition:  Home or Self Care    Diet:  Hospital:  Diet Order   Procedures    Diet: Regular/House, Cardiac; Healthy Heart (2-3 Na+); Texture: Regular (IDDSI 7); Fluid Consistency: Thin (IDDSI 0)          CODE STATUS:    Code Status and Medical Interventions: CPR (Attempt to Resuscitate); Full Support   Ordered at: 11/01/24 2342     Code Status (Patient has no pulse and is not breathing):    CPR (Attempt to Resuscitate)     Medical Interventions (Patient has pulse or is breathing):    Full Support       Future Appointments   Date Time Provider Department Center   11/11/2024  8:45 AM Jacquie Pearl APRN [facility]   11/13/2024 10:30 AM Jaciel Alex APRN [facility]   12/13/2024 12:00 AM Rebeka Franz MD [facility]   1/30/2025  8:45 AM Chava English MD [facility]   2/17/2025  8:00  Jaciel Stewart, APRN [facility], [facility]  11/06/24      Time Spent on Discharge:  I spent  40  minutes on this discharge activity which included: face-to-face encounter with the patient, reviewing the data in the system, coordination of the care with the nursing staff as well as consultants, documentation, and entering orders.

## 2024-11-07 ENCOUNTER — TRANSITIONAL CARE MANAGEMENT TELEPHONE ENCOUNTER (OUTPATIENT)
Dept: CALL CENTER | Facility: HOSPITAL | Age: 64
End: 2024-11-07
Payer: COMMERCIAL

## 2024-11-07 LAB — QT INTERVAL: 392 MS

## 2024-11-07 NOTE — OUTREACH NOTE
Prep Survey      Flowsheet Row Responses   [facility] facility patient discharged from? [place]   Is LACE score < 7 ? Yes   Eligibility [facility]   Date of Admission 11/01/24   Date of Discharge 11/06/24   Discharge Disposition Home or Self Care   Discharge diagnosis Atrial fibrillation with RVR-Left heart cath   Does the patient have one of the following disease processes/diagnoses(primary or secondary)? Other   Does the patient have Home health ordered? No   Is there a DME ordered? No   Prep survey completed? Yes            REBEKA NICKERSON - Registered Nurse

## 2024-11-07 NOTE — OUTREACH NOTE
Call Center TCM Note      Flowsheet Row Responses   [facility] patient discharged from? [place]   Does the patient have one of the following disease processes/diagnoses(primary or secondary)? Other   TCM attempt successful? Yes   Call start time [id number]   Call end time [phone]   Discharge diagnosis Atrial fibrillation with RVR-Left heart cath   Person spoke with today (if not patient) and relationship patient   Meds reviewed with patient/caregiver? Yes   Is the patient having any side effects they believe may be caused by any medication additions or changes? No   Does the patient have all medications ordered at discharge? N/A   Prescription comments Pharmacy was closed last night and patient's wife will  meds this morning.   Is the patient taking all medications as directed (includes completed medication regime)? Yes   Comments Patient has a hospital followup scheduled on 11/13 with Jaciel Alex PCP and will also see cardiology on 11/11/2024   Does the patient have an appointment with their PCP within 7-14 days of discharge? Yes   Psychosocial issues? No   Did the patient receive a copy of their discharge instructions? Yes   Nursing interventions Reviewed instructions with patient   What is the patient's perception of their health status since discharge? Improving   Is the patient/caregiver able to teach back signs and symptoms related to disease process for when to call PCP? Yes   Is the patient/caregiver able to teach back signs and symptoms related to disease process for when to call 911? Yes   Is the patient/caregiver able to teach back the hierarchy of who to call/visit for symptoms/problems? PCP, Specialist, Home health nurse, Urgent Care, ED, 911 Yes   If the patient is a current smoker, are they able to teach back resources for cessation? Not a smoker   TCM call completed? Yes   Wrap up additional comments Doing well,   Call end time [phone]   Would this patient benefit from a Referral to [facility]  Work? No   Is the patient interested in additional calls from an ambulatory ? No            Eric Bergman RN    11/7/2024, 08:59 EST

## 2024-11-11 ENCOUNTER — OFFICE VISIT (OUTPATIENT)
Dept: CARDIOLOGY | Facility: HOSPITAL | Age: 64
End: 2024-11-11
Payer: COMMERCIAL

## 2024-11-11 VITALS
TEMPERATURE: 96.2 F | WEIGHT: 204.3 LBS | HEIGHT: 72 IN | RESPIRATION RATE: 18 BRPM | BODY MASS INDEX: 27.67 KG/M2 | SYSTOLIC BLOOD PRESSURE: 125 MMHG | HEART RATE: 66 BPM | OXYGEN SATURATION: 96 % | DIASTOLIC BLOOD PRESSURE: 85 MMHG

## 2024-11-11 DIAGNOSIS — I10 BENIGN ESSENTIAL HTN: ICD-10-CM

## 2024-11-11 DIAGNOSIS — I50.22 CHRONIC HFREF (HEART FAILURE WITH REDUCED EJECTION FRACTION): Primary | ICD-10-CM

## 2024-11-11 DIAGNOSIS — I48.0 PAF (PAROXYSMAL ATRIAL FIBRILLATION): ICD-10-CM

## 2024-11-11 NOTE — PROGRESS NOTES
"Chief Complaint  Congestive Heart Failure    Subjective    History of Present Illness {  Problem List  Visit  Diagnosis   Encounters  Notes  Medications  Labs  Result Review Imaging  Media :23}       History of Present Illness   64-year-old male presents the office today for ongoing evaluation of his newly diagnosed HFrEF and PAF. Patient was hospitalized at [facility] 11/1/2024 with complaints of chest pain and shortness of breath.  Upon admission to the ER patient was found to be in A-fib with RVR and RSV positive.  Patient underwent left heart cath 11/4/2024 that showed minor CAD with an EF of 30%.  Patient was initiated on GDMT however his blood pressure did not tolerate Entresto.  Underwent KEE/ECV on 11/6/2024 and sent home with a LifeVest.  He currently denies any inappropriate shocks or abnormal alarms. Has not started jardiance yet due to requiring a PA. Does report fatigue when he overdoes it. Currently denies dizziness, presyncope, syncope, orthopnea, pnd, pedal edema. Does report occasional chest tightness.   Objective     Vital Signs:   Vitals:    11/11/24 0844 11/11/24 0845   BP: 128/77 125/85   BP Location: Left arm Left arm   Patient Position: Sitting Sitting   Cuff Size: Adult Adult   Pulse: 74 66   Resp:  18   Temp: 96.2 °F (35.7 °C) 96.2 °F (35.7 °C)   TempSrc: Temporal Temporal   SpO2: 100% 96%   Weight:  92.7 kg (204 lb 4.8 oz)   Height:  182.9 cm (72\")     Body mass index is 27.71 kg/m².  Physical Exam  Vitals and nursing note reviewed.   Constitutional:       Appearance: Normal appearance.   HENT:      Head: Normocephalic.   Eyes:      Pupils: Pupils are equal, round, and reactive to light.   Cardiovascular:      Rate and Rhythm: Normal rate and regular rhythm.      Pulses: Normal pulses.      Heart sounds: Normal heart sounds. No murmur heard.     Comments: Life vest in place   Pulmonary:      Effort: Pulmonary effort is normal.      Breath sounds: Normal breath " sounds.   Abdominal:      General: Bowel sounds are normal.      Palpations: Abdomen is soft.   Musculoskeletal:         General: Normal range of motion.      Cervical back: Normal range of motion.      Right lower leg: No edema.      Left lower leg: No edema.   Skin:     General: Skin is warm and dry.      Capillary Refill: Capillary refill takes less than 2 seconds.   Neurological:      Mental Status: He is alert and oriented to person, place, and time.   Psychiatric:         Mood and Affect: Mood normal.         Thought Content: Thought content normal.              Result Review  Data Reviewed:{ Labs  Result Review  Imaging  Med Tab  Media :23}   Cardiac Catheterization/Vascular Study (11/04/2024 10:59)  Adult Transesophageal Echo (KEE) W/ Cont if Necessary Per Protocol (11/06/2024 11:51)  Cardioversion External in Cardiology Department (11/06/2024 11:51)  Basic Metabolic Panel (11/06/2024 05:28)  Magnesium (11/05/2024 05:41)  Basic Metabolic Panel (11/05/2024 05:41)  CBC & Differential (11/05/2024 05:41)         Assessment and Plan {CC Problem List  Visit Diagnosis  ROS  Review (Popup)  Health Maintenance  Quality  BestPractice  Medications  SmartSets  SnapShot Encounters  Media :23}   1. Chronic HFrEF (heart failure with reduced ejection fraction)  Jardiance samples provided to patient today 10 mg bottles x 4 lot # 24  exp 11/26  Continue Lasix, losartan, Toprol, Aldactone  Will consider Entresto at next office visit if BP allows  - Ambulatory Referral to Cardiac Rehab  Continue life vest  Nyha II  Heart failure education today including signs and symptoms, the role of the heart failure center, daily weights, low sodium diet (less than 1500 mg per day), and daily physical activity. Reviewed HF Zones with patient and family.  Patient to continue current medications as previously ordered.   2. Benign essential HTN    Stable on Toprol, losartan  Continue to monitor  3. PAF (paroxysmal atrial  fibrillation)  CHADS-VASc Risk Assessment               2 Total Score    1 Hypertension    1 Age 65-74    Criteria that do not apply:    CHF    Age >/= 75    DM    PRIOR STROKE/TIA/THROMBO    Vascular Disease    Sex: Female        S/p ECV   Continue eliquis, toprol         I spent 45 minutes caring for Spenser on this date of service. This time includes time spent by me in the following activities:preparing for the visit, reviewing tests, obtaining and/or reviewing a separately obtained history, performing a medically appropriate examination and/or evaluation , counseling and educating the patient/family/caregiver, ordering medications, tests, or procedures, documenting information in the medical record, independently interpreting results and communicating that information with the patient/family/caregiver, and care coordination    Follow Up {Instructions Charge Capture  Follow-up Communications :23}   Return in about 2 weeks (around 11/25/2024) for Office visit, HF.    Patient was given instructions and counseling regarding his condition or for health maintenance advice. Please see specific information pulled into the AVS if appropriate.  Patient was instructed to call the [facility] with any questions, concerns, or worsening symptoms.

## 2024-11-12 ENCOUNTER — TELEPHONE (OUTPATIENT)
Dept: CARDIOLOGY | Facility: HOSPITAL | Age: 64
End: 2024-11-12
Payer: COMMERCIAL

## 2024-11-12 ENCOUNTER — DOCUMENTATION (OUTPATIENT)
Dept: CARDIAC REHAB | Facility: HOSPITAL | Age: 64
End: 2024-11-12
Payer: COMMERCIAL

## 2024-11-12 NOTE — TELEPHONE ENCOUNTER
Caller: Spenser Pascal    Relationship: Self    Best call back number: [phone]     What is the best time to reach you: ANYTIME     What was the call regarding: PATIENT CALLED IN BECAUSE HE NOTICED THAT A MEDICATION HE HAS BEEN TAKEN IS NO LONGER ON HIS MEDICATION LIST. PATIENT HAS BEEN TAKEN LISINOPRIL 10 MG FOR A FEW YEARS NOW, AND WOULD LIKE TO KNOW IF HE SHOULD CONTINUE TAKING THIS MEDICATION.     Is it okay if the provider responds through MyChart: PREFERS  A CALL

## 2024-11-13 ENCOUNTER — TELEPHONE (OUTPATIENT)
Dept: CARDIOLOGY | Facility: CLINIC | Age: 64
End: 2024-11-13
Payer: COMMERCIAL

## 2024-11-13 ENCOUNTER — OFFICE VISIT (OUTPATIENT)
Dept: INTERNAL MEDICINE | Facility: CLINIC | Age: 64
End: 2024-11-13
Payer: COMMERCIAL

## 2024-11-13 ENCOUNTER — LAB (OUTPATIENT)
Dept: INTERNAL MEDICINE | Facility: CLINIC | Age: 64
End: 2024-11-13
Payer: COMMERCIAL

## 2024-11-13 VITALS
WEIGHT: 211 LBS | BODY MASS INDEX: 28.58 KG/M2 | TEMPERATURE: 98 F | OXYGEN SATURATION: 99 % | DIASTOLIC BLOOD PRESSURE: 78 MMHG | SYSTOLIC BLOOD PRESSURE: 124 MMHG | HEART RATE: 78 BPM | HEIGHT: 72 IN

## 2024-11-13 DIAGNOSIS — M05.79 RHEUMATOID ARTHRITIS INVOLVING MULTIPLE SITES WITH POSITIVE RHEUMATOID FACTOR: ICD-10-CM

## 2024-11-13 DIAGNOSIS — I51.7 CARDIOMEGALY: ICD-10-CM

## 2024-11-13 DIAGNOSIS — Z09 HOSPITAL DISCHARGE FOLLOW-UP: Primary | ICD-10-CM

## 2024-11-13 DIAGNOSIS — I50.20 HFREF (HEART FAILURE WITH REDUCED EJECTION FRACTION): ICD-10-CM

## 2024-11-13 DIAGNOSIS — I48.91 NEW ONSET ATRIAL FIBRILLATION: ICD-10-CM

## 2024-11-13 DIAGNOSIS — I10 BENIGN ESSENTIAL HTN: ICD-10-CM

## 2024-11-13 DIAGNOSIS — J21.0 RSV (ACUTE BRONCHIOLITIS DUE TO RESPIRATORY SYNCYTIAL VIRUS): ICD-10-CM

## 2024-11-13 PROCEDURE — 80048 BASIC METABOLIC PNL TOTAL CA: CPT | Performed by: NURSE PRACTITIONER

## 2024-11-13 NOTE — TELEPHONE ENCOUNTER
Called pt to let him know that his discharge summary from 11/1/2024 stated to stop the lisinopril 10 mg. Pt verbalized understanding with no further questions.

## 2024-11-13 NOTE — TELEPHONE ENCOUNTER
Caller: Spenser Pascal    Relationship: Self    Best call back number: [phone]     What is the best time to reach you: ANY    Who are you requesting to speak with (clinical staff, provider,  specific staff member): CLINICAL    What was the call regarding: PATIENT CALLED TO ADVISE THAT THE LIFE VEST THAT WAS ORDERED FOR HIM HAS BEEN DENIED BY HIS INSURANCE. PLEASE CONTACT PATIENT AND ADVISE HOW YOU WOULD LIKE TO PROCEED.    Is it okay if the provider responds through MyChart: PLEASE CALL

## 2024-11-13 NOTE — PROGRESS NOTES
"Chief Complaint   Patient presents with    Follow-up    Chest Pain     Was in Er for Chest Pain.       HPI  Spenser Pascal is a 64 y.o. male presents for a hospital follow-up.  He is accompanied by his spouse.   Pt was admitted to [facility] 11/1-11/6/24 after presenting to the ED with CP and SOB.  He was found to have a new onset A. Fib. RSV, and HFrEF with EF 30%.  He states when he presented to the hospital his HR was in the 180s.  He had a heard cath which was normal.  He was cardioverted which was successful.  He states he feels much better, but he is tired.  Wearing a life vest.  He has been instructed to rest and no work for 3 months.  He plans to use his STD.    The following portions of the patient's history were reviewed and updated as appropriate: allergies, current medications, past family history, past medical history, past social history, past surgical history, and problem list.    Subjective  Review of Systems   Constitutional:  Positive for fatigue. Negative for activity change and appetite change.   HENT:  Negative for congestion.    Respiratory:  Negative for cough and shortness of breath.    Cardiovascular:  Positive for chest pain (mild tightness). Negative for leg swelling.   Gastrointestinal:  Negative for abdominal pain.   Neurological:  Negative for dizziness, weakness and confusion.   Psychiatric/Behavioral:  Negative for behavioral problems and decreased concentration.        Objective  Visit Vitals  /78 (BP Location: Left arm, Patient Position: Sitting)   Pulse 78   Temp 98 °F (36.7 °C)   Ht 182.9 cm (72\")   Wt 95.7 kg (211 lb)   SpO2 99%   BMI 28.62 kg/m²        Physical Exam  Vitals and nursing note reviewed.   HENT:      Head: Normocephalic.   Eyes:      Pupils: Pupils are equal, round, and reactive to light.   Cardiovascular:      Rate and Rhythm: Normal rate and regular rhythm.      Pulses: Normal pulses.      Heart sounds: Normal heart sounds. No murmur heard.     Comments: " Wearing life vest  Pulmonary:      Effort: Pulmonary effort is normal. No respiratory distress.      Breath sounds: Normal breath sounds.   Musculoskeletal:      Right lower leg: No edema.      Left lower leg: No edema.   Skin:     General: Skin is warm and dry.      Capillary Refill: Capillary refill takes less than 2 seconds.   Neurological:      General: No focal deficit present.      Mental Status: He is alert and oriented to person, place, and time.      Gait: Gait is intact.   Psychiatric:         Attention and Perception: Attention normal.         Mood and Affect: Mood normal.         Behavior: Behavior normal.          Procedures     Assessment and Plan  Diagnoses and all orders for this visit:    1. Hospital discharge follow-up (Primary)    2. New onset atrial fibrillation    3. HFrEF (heart failure with reduced ejection fraction)  -     Basic metabolic panel    4. Benign essential HTN    5. Rheumatoid arthritis involving multiple sites with positive rheumatoid factor    6. Cardiomegaly    7. RSV (acute bronchiolitis due to respiratory syncytial virus)    D/C summary reviewed  Pt has been contacted by TCM nurse  Pt is stable at this time in NSR, no distress noted  HTN well controlled on Losartan  Wear life vest for 3 months or per cardiology  Cont Methortrexate per rheumatology  RSV appears to have resolved  Instructed to ED for any SOB, edema, or CP that does not resolve    Return for Next scheduled follow up.      MOON Wu

## 2024-11-14 LAB
ANION GAP SERPL CALCULATED.3IONS-SCNC: 13.3 MMOL/L (ref 5–15)
BUN SERPL-MCNC: 24 MG/DL (ref 8–23)
BUN/CREAT SERPL: 24.5 (ref 7–25)
CALCIUM SPEC-SCNC: 9.6 MG/DL (ref 8.6–10.5)
CHLORIDE SERPL-SCNC: 98 MMOL/L (ref 98–107)
CO2 SERPL-SCNC: 27.7 MMOL/L (ref 22–29)
CREAT SERPL-MCNC: 0.98 MG/DL (ref 0.76–1.27)
EGFRCR SERPLBLD CKD-EPI 2021: 85 ML/MIN/1.73
GLUCOSE SERPL-MCNC: 81 MG/DL (ref 65–99)
POTASSIUM SERPL-SCNC: 4 MMOL/L (ref 3.5–5.2)
SODIUM SERPL-SCNC: 139 MMOL/L (ref 136–145)

## 2024-11-14 NOTE — TELEPHONE ENCOUNTER
Spoke with [employer] representative. Life Vest approval is pending with insurance appeals. There is nothing additional that our office needs to do. LVM with patient and provided the number to the [employer] rep if he should have any additional questions.

## 2024-11-19 ENCOUNTER — DOCUMENTATION (OUTPATIENT)
Dept: CARDIAC REHAB | Facility: HOSPITAL | Age: 64
End: 2024-11-19
Payer: COMMERCIAL

## 2024-11-19 ENCOUNTER — TRANSCRIBE ORDERS (OUTPATIENT)
Dept: CARDIAC REHAB | Facility: HOSPITAL | Age: 64
End: 2024-11-19
Payer: COMMERCIAL

## 2024-11-19 DIAGNOSIS — I50.22 CHRONIC SYSTOLIC (CONGESTIVE) HEART FAILURE: Primary | ICD-10-CM

## 2024-11-19 NOTE — PROGRESS NOTES
Pt. Referred for Phase II Cardiac Rehab. Staff discussed benefits of exercise, program protocol, and educational material provided. Teach back verified.  Patient scheduled for orientation at [facility] on December 19th at 1:00 PM.

## 2024-11-26 ENCOUNTER — OFFICE VISIT (OUTPATIENT)
Dept: CARDIOLOGY | Facility: HOSPITAL | Age: 64
End: 2024-11-26
Payer: COMMERCIAL

## 2024-11-26 VITALS
HEART RATE: 56 BPM | HEIGHT: 72 IN | DIASTOLIC BLOOD PRESSURE: 82 MMHG | WEIGHT: 210.31 LBS | TEMPERATURE: 97 F | RESPIRATION RATE: 16 BRPM | SYSTOLIC BLOOD PRESSURE: 134 MMHG | BODY MASS INDEX: 28.48 KG/M2 | OXYGEN SATURATION: 94 %

## 2024-11-26 DIAGNOSIS — I10 BENIGN ESSENTIAL HTN: ICD-10-CM

## 2024-11-26 DIAGNOSIS — I48.0 PAF (PAROXYSMAL ATRIAL FIBRILLATION): ICD-10-CM

## 2024-11-26 DIAGNOSIS — I50.22 CHRONIC HFREF (HEART FAILURE WITH REDUCED EJECTION FRACTION): Primary | ICD-10-CM

## 2024-11-26 RX ORDER — OMEGA-3S/DHA/EPA/FISH OIL/D3 300MG-1000
400 CAPSULE ORAL DAILY
COMMUNITY

## 2024-11-26 RX ORDER — SACUBITRIL AND VALSARTAN 24; 26 MG/1; MG/1
1 TABLET, FILM COATED ORAL 2 TIMES DAILY
Qty: 60 TABLET | Refills: 3 | Status: SHIPPED | OUTPATIENT
Start: 2024-11-26

## 2024-11-26 NOTE — PROGRESS NOTES
"Chief Complaint  Follow-up and Congestive Heart Failure    Subjective    History of Present Illness {CC  Problem List  Visit  Diagnosis   Encounters  Notes  Medications  Labs  Result Review Imaging  Media :23}       History of Present Illness   64-year-old male presents the office today for ongoing evaluation of his newly diagnosed HFrEF and PAF. Patient was hospitalized at [facility] 11/1/2024 with complaints of chest pain and shortness of breath.  Upon admission to the ER patient was found to be in A-fib with RVR and RSV positive.  Patient underwent left heart cath 11/4/2024 that showed minor CAD with an EF of 30%.  Patient was initiated on GDMT however his blood pressure did not tolerate Entresto.  Underwent KEE/ECV on 11/6/2024 and sent home with a LifeVest.  He currently denies any inappropriate shocks or abnormal alarms. . Does report fatigue when he overdoes it. Currently denies dizziness, presyncope, syncope, orthopnea, pnd, pedal edema. Has returned to the gym.  Objective     Vital Signs:   Vitals:    11/26/24 0838   BP: 134/82   BP Location: Left arm   Patient Position: Sitting   Cuff Size: Adult   Pulse: 56   Resp: 16   Temp: 97 °F (36.1 °C)   TempSrc: Temporal   SpO2: 94%   Weight: 95.4 kg (210 lb 5 oz)   Height: 182.9 cm (72\")     Body mass index is 28.52 kg/m².  Physical Exam  Vitals and nursing note reviewed.   Constitutional:       Appearance: Normal appearance.   HENT:      Head: Normocephalic.   Eyes:      Pupils: Pupils are equal, round, and reactive to light.   Cardiovascular:      Rate and Rhythm: Normal rate and regular rhythm.      Pulses: Normal pulses.      Heart sounds: Normal heart sounds. No murmur heard.     Comments: Life vest in place   Pulmonary:      Effort: Pulmonary effort is normal.      Breath sounds: Normal breath sounds.   Abdominal:      General: Bowel sounds are normal.      Palpations: Abdomen is soft.   Musculoskeletal:         General: Normal range " of motion.      Cervical back: Normal range of motion.      Right lower leg: No edema.      Left lower leg: No edema.   Skin:     General: Skin is warm and dry.      Capillary Refill: Capillary refill takes less than 2 seconds.   Neurological:      Mental Status: He is alert and oriented to person, place, and time.   Psychiatric:         Mood and Affect: Mood normal.         Thought Content: Thought content normal.            Result Review  Data Reviewed:{ Labs  Result Review  Imaging  Med Tab  Media :23}   Cardiac Catheterization/Vascular Study (11/04/2024 10:59)  Adult Transesophageal Echo (KEE) W/ Cont if Necessary Per Protocol (11/06/2024 11:51)  Cardioversion External in Cardiology Department (11/06/2024 11:51)  Basic Metabolic Panel (11/06/2024 05:28)  Magnesium (11/05/2024 05:41)  Basic Metabolic Panel (11/05/2024 05:41)  CBC & Differential (11/05/2024 05:41)         Assessment and Plan {CC Problem List  Visit Diagnosis  ROS  Review (Popup)  Health Maintenance  Quality  BestPractice  Medications  SmartSets  SnapShot Encounters  Media :23}   1. Chronic HFrEF (heart failure with reduced ejection fraction)  Nyha II  Continue Lasix, Toprol, Aldactone,jardiance   Stop losartan and begin entresto 24/26 mg bid   Cardiac rehab to start in the near future  Continue life vest    Heart failure education today including signs and symptoms, the role of the heart failure center, daily weights, low sodium diet (less than 1500 mg per day), and daily physical activity. Reviewed HF Zones with patient and family.  Patient to continue current medications as previously ordered.   2. Benign essential HTN    Stable on Toprol  Stop losartan and begin entresto 24/26 mg bid   Continue to monitor  3. PAF (paroxysmal atrial fibrillation)  CHADS-VASc Risk Assessment               2 Total Score    1 Hypertension    1 Age 65-74    Criteria that do not apply:    CHF    Age >/= 75    DM    PRIOR STROKE/TIA/THROMBO    Vascular  Disease    Sex: Female        S/p ECV   Continue eliquis, toprol         I spent 44 minutes caring for Spenser on this date of service. This time includes time spent by me in the following activities:preparing for the visit, reviewing tests, obtaining and/or reviewing a separately obtained history, performing a medically appropriate examination and/or evaluation , counseling and educating the patient/family/caregiver, ordering medications, tests, or procedures, documenting information in the medical record, independently interpreting results and communicating that information with the patient/family/caregiver, and care coordination    Follow Up {Instructions Charge Capture  Follow-up Communications :23}   Return in about 5 weeks (around 1/3/2025) for Office visit, HF.    Patient was given instructions and counseling regarding his condition or for health maintenance advice. Please see specific information pulled into the AVS if appropriate.  Patient was instructed to call the [facility] with any questions, concerns, or worsening symptoms.

## 2024-11-26 NOTE — PATIENT INSTRUCTIONS
Stop cozaar/losartan   Begin entresto 24/26 mg twice a day starting tomorrow   Begin checking bp 1-2 times a day

## 2024-11-27 RX ORDER — METOPROLOL SUCCINATE 50 MG/1
50 TABLET, EXTENDED RELEASE ORAL DAILY
Qty: 30 TABLET | Refills: 3 | Status: SHIPPED | OUTPATIENT
Start: 2024-11-27

## 2024-11-27 RX ORDER — SPIRONOLACTONE 25 MG/1
25 TABLET ORAL DAILY
Qty: 30 TABLET | Refills: 3 | Status: SHIPPED | OUTPATIENT
Start: 2024-11-27

## 2024-12-03 RX ORDER — METOPROLOL SUCCINATE 50 MG/1
50 TABLET, EXTENDED RELEASE ORAL DAILY
Qty: 30 TABLET | Refills: 3 | Status: SHIPPED | OUTPATIENT
Start: 2024-12-03

## 2024-12-03 RX ORDER — ASPIRIN 81 MG/1
81 TABLET ORAL DAILY
Qty: 30 TABLET | Refills: 3 | Status: SHIPPED | OUTPATIENT
Start: 2024-12-03

## 2024-12-09 ENCOUNTER — TELEPHONE (OUTPATIENT)
Dept: CARDIOLOGY | Facility: HOSPITAL | Age: 64
End: 2024-12-09
Payer: COMMERCIAL

## 2024-12-09 NOTE — PROGRESS NOTES
[facility] Cardiology    Encounter Date: 2024    Patient ID: Spenser Pascal is a 64 y.o. male.  : 1960     PCP: Jaciel Alex APRN       Chief Complaint: Chronic HFrEF (heart failure with reduced ejection fraction)      PROBLEM LIST:  HFrEF  Echo, 10/30/2024: EF 39.4%. RV /LA/RA cavities dilated. Moderate MV regurgitation. RVSP from TR is mildly elevated at 35-45 mmHg.   LHC, 2024: EF 30%. No evidence of urinary significant coronary disease.   PAF  KEE, 2024: EF 26-30%. LA cavity mildly dilated. Trace to mild MR. Trace aortic insufficiency. Trace TR.   Successful Cardioversion, 2024.   Cardiomyopathy  Hypertension     History of Present Illness  Patient presents today for a hospital follow-up with a history of cardiomyopathy, HFrEF and atrial fibrillation.  The patient has been followed in CHF clinic and his medical therapy has been optimized.  States that he still gets occasional chest pressure and shortness of breath but overall he is doing better and is trying to exercise.  He noted some weight gain and was advised to increase his Lasix.  No edema palpitations dizziness or syncope.  He is wearing his LifeVest and states that there is some issue with insurance approval.   No Known Allergies      Current Outpatient Medications:     apixaban (ELIQUIS) 5 MG tablet tablet, Take 1 tablet by mouth Every 12 (Twelve) Hours. Indications: Atrial Fibrillation, Atrial Fibrillation, Disp: 60 tablet, Rfl: 3    aspirin 81 MG EC tablet, Take 1 tablet by mouth Daily., Disp: 30 tablet, Rfl: 3    Cholecalciferol 10 MCG (400 UNIT) tablet, Take 1 tablet by mouth Daily., Disp: , Rfl:     empagliflozin (JARDIANCE) 10 MG tablet tablet, Take 1 tablet by mouth Daily., Disp: 30 tablet, Rfl: 0    folic acid (FOLVITE) 1 MG tablet, Take 2 tablets by mouth Daily., Disp: 180 tablet, Rfl: 1    furosemide (LASIX) 40 MG tablet, Take 1 tablet by mouth Daily., Disp: 30 tablet,  "Rfl: 0    levothyroxine (SYNTHROID, LEVOTHROID) 175 MCG tablet, Take 1 tablet by mouth Daily., Disp: 30 tablet, Rfl: 3    Methotrexate Sodium (methotrexate PF) 50 MG/2ML chemo syringe, Inject 0.8 mL under the skin into the appropriate area as directed 1 (One) Time Per Week., Disp: 3.2 mL, Rfl: 2    metoprolol succinate XL (TOPROL-XL) 50 MG 24 hr tablet, Take 1 tablet by mouth Daily., Disp: 30 tablet, Rfl: 3    Orencia ClickJect 125 MG/ML solution auto-injector, Inject 1 mL under the skin into the appropriate area as directed 1 (One) Time Per Week., Disp: 4 mL, Rfl: 3    sacubitril-valsartan (Entresto) 24-26 MG tablet, Take 1 tablet by mouth 2 (Two) Times a Day., Disp: 60 tablet, Rfl: 3    spironolactone (ALDACTONE) 25 MG tablet, Take 1 tablet by mouth Daily., Disp: 30 tablet, Rfl: 3    The following portions of the patient's history were reviewed and updated as appropriate: allergies, current medications, past family history, past medical history, past social history, past surgical history and problem list.    ROS  Review of Systems   14 point ROS negative except for that listed in the HPI.         Objective:     /74 (BP Location: Right arm, Patient Position: Sitting, Cuff Size: Adult)   Pulse 78   Ht 182.9 cm (72\")   Wt 94.9 kg (209 lb 3.2 oz)   SpO2 97%   BMI 28.37 kg/m²      Physical Exam  Constitutional: Patient appears well-developed and well-nourished.   HENT: HEENT exam unremarkable.   Neck: Neck supple. No JVD present. No carotid bruits.   Cardiovascular: Normal rate, regular rhythm and normal heart sounds. No murmur heard.   2+ symmetric pulses.   Pulmonary/Chest: Breath sounds normal. Does not exhibit tenderness.   Abdominal: Abdomen benign.   Musculoskeletal: Does not exhibit edema.   Neurological: Neurological exam unremarkable.   Vitals reviewed.    Data Review:   Lab Results   Component Value Date    GLUCOSE 81 11/13/2024    BUN 24 (H) 11/13/2024    CREATININE 0.98 11/13/2024    EGFR 85.0 " 11/13/2024    BCR 24.5 11/13/2024     11/13/2024    K 4.0 11/13/2024    CO2 27.7 11/13/2024    CALCIUM 9.6 11/13/2024    ALBUMIN 4.0 11/02/2024    AST 25 11/02/2024    ALT 38 11/02/2024     Lab Results   Component Value Date    CHOL 121 11/02/2024    TRIG 64 11/02/2024    HDL 50 11/02/2024    LDL 57 11/02/2024      Lab Results   Component Value Date    WBC 7.83 11/05/2024    RBC 5.37 11/05/2024    HGB 16.9 11/05/2024    HCT 50.6 11/05/2024    MCV 94.2 11/05/2024     11/05/2024     Lab Results   Component Value Date    TSH 1.270 11/01/2024     Lab Results   Component Value Date    HGBA1C 5.50 11/02/2024          ECG 12 Lead    Date/Time: 12/13/2024 12:48 PM  Performed by: Rebeka Franz MD    Authorized by: Rebeka Franz MD  Comparison: compared with previous ECG from 11/6/2024  Similar to previous ECG  Rhythm: sinus rhythm    Clinical impression: normal ECG             Advance Care Planning   ACP discussion was declined by the patient. Patient does not have an advance directive, declines further assistance.           Assessment:      Diagnosis Plan   1. Chronic HFrEF (heart failure with reduced ejection fraction)  Stable, continue current GDMT and diuretic therapy.      2. Cardiomyopathy, unspecified type  Repeat echocardiogram in 3 months from November to follow-up on EF, continue to wear LifeVest      3. PAF (paroxysmal atrial fibrillation)  Containing sinus rhythm post ECV, continue Toprol and Eliquis.      4. Essential hypertension  Well-controlled, continue current antihypertensive therapy including Entresto and Toprol.        Plan:   Stable cardiac status.  CHF stable/compensated.  Increase activities as tolerated.  Continue LifeVest use.  Repeat echo in first week of February.  Continue current medications.   FU in 3 MO, sooner as needed.  Thank you for allowing us to participate in the care of your patient.       Rebeka Franz MD, FAC, [facility]        Please note that portions of this note may have  been completed with a voice recognition program. Efforts were made to edit the dictations, but occasionally words are mistranscribed.

## 2024-12-09 NOTE — TELEPHONE ENCOUNTER
Patient called gained 5 lbs from yesterday weight yesterday 203 lbs today 208 lbs. Did have some SOA yesterday but not today. BP this morning 137/88 before meds and 109/77 after meds. He was wondering what he should do with the weight gain.

## 2024-12-09 NOTE — TELEPHONE ENCOUNTER
Pt verbalized understanding on taking lasix 40 mg twice a day for two days and if no improvement by Wednesday call for appointment.

## 2024-12-13 ENCOUNTER — TELEPHONE (OUTPATIENT)
Dept: CARDIOLOGY | Facility: CLINIC | Age: 64
End: 2024-12-13
Payer: COMMERCIAL

## 2024-12-13 ENCOUNTER — OFFICE VISIT (OUTPATIENT)
Dept: CARDIOLOGY | Facility: CLINIC | Age: 64
End: 2024-12-13
Payer: COMMERCIAL

## 2024-12-13 VITALS
HEART RATE: 78 BPM | HEIGHT: 72 IN | WEIGHT: 209.2 LBS | OXYGEN SATURATION: 97 % | BODY MASS INDEX: 28.33 KG/M2 | SYSTOLIC BLOOD PRESSURE: 102 MMHG | DIASTOLIC BLOOD PRESSURE: 74 MMHG

## 2024-12-13 DIAGNOSIS — I48.0 PAF (PAROXYSMAL ATRIAL FIBRILLATION): ICD-10-CM

## 2024-12-13 DIAGNOSIS — I42.9 CARDIOMYOPATHY, UNSPECIFIED TYPE: ICD-10-CM

## 2024-12-13 DIAGNOSIS — I10 ESSENTIAL HYPERTENSION: ICD-10-CM

## 2024-12-13 DIAGNOSIS — I50.22 CHRONIC HFREF (HEART FAILURE WITH REDUCED EJECTION FRACTION): Primary | ICD-10-CM

## 2024-12-13 DIAGNOSIS — I50.20 HFREF (HEART FAILURE WITH REDUCED EJECTION FRACTION): Primary | ICD-10-CM

## 2024-12-13 NOTE — TELEPHONE ENCOUNTER
----- Message from Rebeka Franz sent at 12/13/2024 12:46 PM EST -----  Please order echo to be done 3 months from his hospitalization in November so roughly around February 5 or so.

## 2024-12-17 ENCOUNTER — PATIENT MESSAGE (OUTPATIENT)
Age: 64
End: 2024-12-17
Payer: COMMERCIAL

## 2024-12-17 NOTE — TELEPHONE ENCOUNTER
Pt sent [employer] message requesting refill of Folic Acid be sent to [facility] in [place]. He does not need any refills at this time as a 90 day Rx with 1 refill was sent in on 10/18.

## 2024-12-19 ENCOUNTER — TREATMENT (OUTPATIENT)
Dept: CARDIAC REHAB | Facility: HOSPITAL | Age: 64
End: 2024-12-19
Payer: COMMERCIAL

## 2024-12-19 DIAGNOSIS — I50.22 CHRONIC SYSTOLIC (CONGESTIVE) HEART FAILURE: ICD-10-CM

## 2024-12-19 PROCEDURE — 93798 PHYS/QHP OP CAR RHAB W/ECG: CPT

## 2024-12-19 NOTE — PROGRESS NOTES
Attended Phase II Cardiac Rehab Orientation. Lung and heart assessment done per RN. Medication and health history reconciled.

## 2024-12-20 ENCOUNTER — TREATMENT (OUTPATIENT)
Dept: CARDIAC REHAB | Facility: HOSPITAL | Age: 64
End: 2024-12-20
Payer: COMMERCIAL

## 2024-12-20 DIAGNOSIS — I50.22 CHRONIC SYSTOLIC (CONGESTIVE) HEART FAILURE: Primary | ICD-10-CM

## 2024-12-20 DIAGNOSIS — E03.9 ACQUIRED HYPOTHYROIDISM: ICD-10-CM

## 2024-12-20 PROCEDURE — 93798 PHYS/QHP OP CAR RHAB W/ECG: CPT

## 2024-12-20 RX ORDER — LEVOTHYROXINE SODIUM 175 UG/1
175 TABLET ORAL DAILY
Qty: 30 TABLET | Refills: 0 | Status: SHIPPED | OUTPATIENT
Start: 2024-12-20

## 2024-12-23 ENCOUNTER — TREATMENT (OUTPATIENT)
Dept: CARDIAC REHAB | Facility: HOSPITAL | Age: 64
End: 2024-12-23
Payer: COMMERCIAL

## 2024-12-23 ENCOUNTER — TELEPHONE (OUTPATIENT)
Dept: CARDIOLOGY | Facility: HOSPITAL | Age: 64
End: 2024-12-23
Payer: COMMERCIAL

## 2024-12-23 DIAGNOSIS — I50.22 CHRONIC SYSTOLIC (CONGESTIVE) HEART FAILURE: Primary | ICD-10-CM

## 2024-12-23 PROCEDURE — 93798 PHYS/QHP OP CAR RHAB W/ECG: CPT

## 2024-12-23 PROCEDURE — 93797 PHYS/QHP OP CAR RHAB WO ECG: CPT

## 2024-12-23 RX ORDER — FUROSEMIDE 40 MG/1
40 TABLET ORAL DAILY
Qty: 90 TABLET | Refills: 3 | Status: SHIPPED | OUTPATIENT
Start: 2024-12-23

## 2024-12-23 NOTE — PROGRESS NOTES
"  NUTRITION ASSESSMENT & EDUCATION  CARDIAC/PULMONARY REHAB      Appointment Date and Time: 12/23/24, 08:08 EST  Patient Name: Spenser Pascal   Age: 64 y.o.     Sex:  male      Employment/Activity Level:   Spenser's education level: MS  Occupation:  [employer]                          Job Activity Level: mild  Routine Exercise: strong. Prior to exacerbation of heart failure, went to the gym 5-6 days a week                                    Weight Assessment:   Height:   Ht Readings from Last 1 Encounters:   12/13/24 182.9 cm (72\")     Weight:   Wt Readings from Last 1 Encounters:   12/13/24 94.9 kg (209 lb 3.2 oz)         BMI:    BMI Readings from Last 1 Encounters:   12/13/24 28.37 kg/m²       -------------------------------------------------------------------------------------------------  IBW: Ideal body weight: 77.6 kg (171 lb 1.2 oz)  Adjusted ideal body weight: 84.5 kg (186 lb 5.2 oz)  -------------------------------------------------------------------------------------------------  Weight Assessment: Overweight, acceptable for age  Weight Change last six months: relatively stable weight within 5 lbs       Usual Weight: 210 lb       Desired Weight: <200 lb as a personal goal    Cardiac Risk Factors:         Atherosclerotic heart disease       Heart failure       Hypertension       Hypothyroidism      A-fib, immunosuppression r/t drug therapy, RA, chronic back pain, JAZMIN, chronic fatigue    Pertinent Lab Values:     Total Cholesterol   Date Value Ref Range Status   11/02/2024 121 0 - 200 mg/dL Final     HDL Cholesterol   Date Value Ref Range Status   11/02/2024 50 40 - 60 mg/dL Final     LDL Cholesterol    Date Value Ref Range Status   11/02/2024 57 0 - 100 mg/dL Final     LDL/HDL Ratio   Date Value Ref Range Status   11/02/2024 1.16  Final     Triglycerides   Date Value Ref Range Status   11/02/2024 64 0 - 150 mg/dL Final     Hemoglobin A1C   Date Value Ref Range Status   11/02/2024 5.50 4.80 - 5.60 % " Final     TSH   Date Value Ref Range Status   11/01/2024 1.270 0.270 - 4.200 uIU/mL Final     Glucose   Date Value Ref Range Status   11/13/2024 81 65 - 99 mg/dL Final     Labs reviewed with Pt today    Pertinent Medications:   Nutritional Supplements: reviewed  Pertinent Nutrition-Related Medications: Reviewed - no changes recommended at this time.  Self Identified Problems or Concerns:   Goal of wt loss, keep sodium intake low    Nutrition Influences:   Appetite: good            Taste/smell changes:  No  Factors limiting PO intake: none    Food records reviewed?: Yes, completed review of 'Rate Your Plate' score and tallies.  Extensive review and discussion of home diet today.    Spenser lives with: wife, Radha Dueñas receives lifestyle support from others at home?: Yes  Spouse/significant other present for diet instruction today?: No    Diet Assessment:   Diet Survey Score: 48 of possible 72 = 67 % compliant with AHA guidelines    Meal intake pattern:  3 meals plus 1-2 snacks  Dining out:  yes, 1-2 x wk @ [employer] or other casual dining               Fast food:  no    24 hour recall: available in chart  Who does the grocery shopping:  Yolie  Who does the cooking at home:  Yolie                     Home diet review:  Generally Heart Healthy Fat intake, has made changes toward American Heart Association guidelines, Moderate-acceptable Sodium intake, aware of sodium guidelines and strives to reduce intake, Strives for a Heart Healthy Diet, Has recently made positive changes toward AHA nutrition guidelines, Expressed motivation to make heart healthy changes in diet today, and Diet includes several of the AHA recommendations of olive oil, nuts, beans, fish/poultry, minimal added sugars    Motivation level toward diet compliance:  moderate  Assessment / Recommendations:   Prior diet education before to coming to Cardiac Rehab?: Yes   Instructed provided on:  American Heart Association 2021 Nutrition Guidelines, Saturated  Fat, Picking Healthy Proteins, Get the Scoop on Sodium/Salt <2300 mg/d, Added Sugars Guidance, Go Nuts for Heart Health, [place] 3 fats in Fish & Seafood, Dining Out Doesn't Mean Ditch Your Diet, Foods to Avoid on the Cardiac Diet, and Heart Failure diet/Sodium restriction 1500 mg/d  Written materials provided to patient: Yes    Goals/Plan:   Patient defined goals:   1) start reading sodium content labels on all foods for next 2 weeks  2) practice portion control  3) increase dried bean intake to at least once weekly    Plan:  Encouraged to complete goals and continue setting new nutrition/exercise goals             Encouraged to follow up with dietitian during cardiac rehab sessions; may request additional RD counseling.    Sandra Barros RD, 08:08 EST - 12/23/2024

## 2024-12-23 NOTE — TELEPHONE ENCOUNTER
Caller: Sepnser Pascal    Relationship: Self    Best call back number: [phone]     What is the best time to reach you: ANYTIME    Who are you requesting to speak with (clinical staff, provider,  specific staff member): ANYONE    What was the call regarding:  PT IS WANTING TO KNOW IF BARBARA PALOMARES CAN REFILL FUROSEMIDE 40 MG/ THE DR THAT PRESCRIBED THIS MEDICATION WAS A PROVIDER WHEN HE WAS IN THE HOSPITAL. THE PHARMACY IS HAVING A HARD TIME GETTING IN TOUCH WITH THE PRESCRIBING DR. PT WILL RUN OUT OF MEDICATION ON 12/26/24    Is it okay if the provider responds through MyChart: YES

## 2024-12-27 ENCOUNTER — TREATMENT (OUTPATIENT)
Dept: CARDIAC REHAB | Facility: HOSPITAL | Age: 64
End: 2024-12-27
Payer: COMMERCIAL

## 2024-12-27 DIAGNOSIS — I50.22 CHRONIC SYSTOLIC (CONGESTIVE) HEART FAILURE: Primary | ICD-10-CM

## 2024-12-27 PROCEDURE — 93798 PHYS/QHP OP CAR RHAB W/ECG: CPT

## 2024-12-30 ENCOUNTER — TREATMENT (OUTPATIENT)
Dept: CARDIAC REHAB | Facility: HOSPITAL | Age: 64
End: 2024-12-30
Payer: COMMERCIAL

## 2024-12-30 DIAGNOSIS — I50.22 CHRONIC SYSTOLIC (CONGESTIVE) HEART FAILURE: Primary | ICD-10-CM

## 2024-12-30 PROCEDURE — 93798 PHYS/QHP OP CAR RHAB W/ECG: CPT

## 2025-01-06 ENCOUNTER — TELEMEDICINE (OUTPATIENT)
Dept: CARDIOLOGY | Facility: HOSPITAL | Age: 65
End: 2025-01-06
Payer: COMMERCIAL

## 2025-01-06 VITALS — BODY MASS INDEX: 28.31 KG/M2 | WEIGHT: 209 LBS | HEIGHT: 72 IN

## 2025-01-06 DIAGNOSIS — I10 ESSENTIAL HYPERTENSION: ICD-10-CM

## 2025-01-06 DIAGNOSIS — I50.22 CHRONIC HFREF (HEART FAILURE WITH REDUCED EJECTION FRACTION): Primary | ICD-10-CM

## 2025-01-06 DIAGNOSIS — I48.0 PAF (PAROXYSMAL ATRIAL FIBRILLATION): ICD-10-CM

## 2025-01-06 NOTE — PROGRESS NOTES
Chief Complaint  Follow-up (Hfref )    Subjective    History of Present Illness {CC  Problem List  Visit  Diagnosis   Encounters  Notes  Medications  Labs  Result Review Imaging  Media :23}     You have chosen to receive care through the use of telemedicine. Telemedicine enables health care providers at different locations to provide safe, effective, and convenient care through the use of technology. As with any health care service, there are risks associated with the use of telemedicine, including equipment failure, poor connections, and  issues.    Do you understand the risks and benefits of telemedicine as I have explained them to you? Yes  Have your questions regarding telemedicine been answered? Yes  Do you consent to the use of telemedicine in your medical care today? Yes   Patient is in his house in [place] and provider is at her home in [facility]   History of Present Illness   64-year-old male presents the office today for ongoing evaluation of his newly diagnosed HFrEF and PAF. Patient was hospitalized at [facility] 11/1/2024 with complaints of chest pain and shortness of breath.  Upon admission to the ER patient was found to be in A-fib with RVR and RSV positive.  Patient underwent left heart cath 11/4/2024 that showed minor CAD with an EF of 30%.  Patient was initiated on GDMT however his blood pressure did not tolerate Entresto during hospitalization. Is now tolerating entresto.  Underwent KEE/ECV on 11/6/2024 and sent home with a LifeVest.  He currently denies any inappropriate shocks or abnormal alarms. . Does report fatigue when he overdoes it. Currently denies dizziness, presyncope, syncope, orthopnea, pnd, pedal edema. Has returned to the gym and is participating in cardiac rehab. Does report that he has had a cold recently and has been experiencing a cough as well as intermittent wheezing. Does not note any pedal edema.   Objective     Vital Signs:  "  Vitals:    01/06/25 1251   Weight: 94.8 kg (209 lb)   Height: 182.9 cm (72\")     Body mass index is 28.35 kg/m².  Physical Exam  Vitals and nursing note reviewed.   Constitutional:       Appearance: Normal appearance.   HENT:      Head: Normocephalic.   Cardiovascular:      Comments: Life vest in place   Pulmonary:      Effort: Pulmonary effort is normal.   Neurological:      Mental Status: He is alert and oriented to person, place, and time.   Psychiatric:         Mood and Affect: Mood normal.         Behavior: Behavior normal.         Thought Content: Thought content normal.              Result Review  Data Reviewed:{ Labs  Result Review  Imaging  Med Tab  Media :23}   Cardiac Catheterization/Vascular Study (11/04/2024 10:59)  Adult Transesophageal Echo (KEE) W/ Cont if Necessary Per Protocol (11/06/2024 11:51)  Cardioversion External in Cardiology Department (11/06/2024 11:51)  Basic Metabolic Panel (11/06/2024 05:28)  Magnesium (11/05/2024 05:41)  Basic Metabolic Panel (11/05/2024 05:41)  CBC & Differential (11/05/2024 05:41)         Assessment and Plan {CC Problem List  Visit Diagnosis  ROS  Review (Popup)  Health Maintenance  Quality  BestPractice  Medications  SmartSets  SnapShot Encounters  Media :23}   1. Chronic HFrEF (heart failure with reduced ejection fraction)  Nyha II  Continue Lasix, Toprol, Aldactone,jardiance, entresto   Continue cardiac rehab  Unable to uptitrate due to recent bp of 102 systolic   Continue life vest  Echo February 2025   Heart failure education today including signs and symptoms, the role of the heart failure center, daily weights, low sodium diet (less than 1500 mg per day), and daily physical activity. Reviewed HF Zones with patient and family.  Patient to continue current medications as previously ordered.   2. Benign essential HTN    Stable on Toprol, entresto 24/26 mg bid   Continue to monitor  3. PAF (paroxysmal atrial fibrillation)  CHADS-VASc Risk " Assessment               2 Total Score    1 Hypertension    1 Age 65-74    Criteria that do not apply:    CHF    Age >/= 75    DM    PRIOR STROKE/TIA/THROMBO    Vascular Disease    Sex: Female        S/p ECV   Continue eliquis, toprol         I spent 44 minutes caring for Spenser on this date of service. This time includes time spent by me in the following activities:preparing for the visit, reviewing tests, obtaining and/or reviewing a separately obtained history, performing a medically appropriate examination and/or evaluation , counseling and educating the patient/family/caregiver, ordering medications, tests, or procedures, documenting information in the medical record, independently interpreting results and communicating that information with the patient/family/caregiver, and care coordination    Follow Up {Instructions Charge Capture  Follow-up Communications :23}   No follow-ups on file.    Patient was given instructions and counseling regarding his condition or for health maintenance advice. Please see specific information pulled into the AVS if appropriate.  Patient was instructed to call the [facility] with any questions, concerns, or worsening symptoms.

## 2025-01-08 ENCOUNTER — TREATMENT (OUTPATIENT)
Dept: CARDIAC REHAB | Facility: HOSPITAL | Age: 65
End: 2025-01-08
Payer: COMMERCIAL

## 2025-01-08 DIAGNOSIS — I50.22 CHRONIC SYSTOLIC (CONGESTIVE) HEART FAILURE: Primary | ICD-10-CM

## 2025-01-08 PROCEDURE — 93798 PHYS/QHP OP CAR RHAB W/ECG: CPT

## 2025-01-09 ENCOUNTER — PATIENT MESSAGE (OUTPATIENT)
Dept: CARDIOLOGY | Facility: CLINIC | Age: 65
End: 2025-01-09
Payer: COMMERCIAL

## 2025-01-10 ENCOUNTER — TELEPHONE (OUTPATIENT)
Dept: CARDIOLOGY | Facility: HOSPITAL | Age: 65
End: 2025-01-10
Payer: COMMERCIAL

## 2025-01-10 ENCOUNTER — TREATMENT (OUTPATIENT)
Dept: CARDIAC REHAB | Facility: HOSPITAL | Age: 65
End: 2025-01-10
Payer: COMMERCIAL

## 2025-01-10 DIAGNOSIS — I50.22 CHRONIC SYSTOLIC (CONGESTIVE) HEART FAILURE: Primary | ICD-10-CM

## 2025-01-10 PROCEDURE — 93798 PHYS/QHP OP CAR RHAB W/ECG: CPT

## 2025-01-10 NOTE — TELEPHONE ENCOUNTER
Spoke with patient who continues with a cough but notes that cough has improved. Recent URI.   Patient did take an extra dose of lasix for a few days. Continue monitoring and will call the office with any changes in symptoms.

## 2025-01-13 ENCOUNTER — TREATMENT (OUTPATIENT)
Dept: CARDIAC REHAB | Facility: HOSPITAL | Age: 65
End: 2025-01-13
Payer: COMMERCIAL

## 2025-01-13 DIAGNOSIS — I50.22 CHRONIC SYSTOLIC (CONGESTIVE) HEART FAILURE: Primary | ICD-10-CM

## 2025-01-13 PROCEDURE — 93798 PHYS/QHP OP CAR RHAB W/ECG: CPT

## 2025-01-15 ENCOUNTER — TREATMENT (OUTPATIENT)
Dept: CARDIAC REHAB | Facility: HOSPITAL | Age: 65
End: 2025-01-15
Payer: COMMERCIAL

## 2025-01-15 DIAGNOSIS — I50.22 CHRONIC SYSTOLIC (CONGESTIVE) HEART FAILURE: Primary | ICD-10-CM

## 2025-01-15 PROCEDURE — 93798 PHYS/QHP OP CAR RHAB W/ECG: CPT

## 2025-01-16 ENCOUNTER — TELEPHONE (OUTPATIENT)
Dept: CARDIOLOGY | Facility: CLINIC | Age: 65
End: 2025-01-16
Payer: COMMERCIAL

## 2025-01-16 DIAGNOSIS — E03.9 ACQUIRED HYPOTHYROIDISM: ICD-10-CM

## 2025-01-17 ENCOUNTER — TREATMENT (OUTPATIENT)
Dept: CARDIAC REHAB | Facility: HOSPITAL | Age: 65
End: 2025-01-17
Payer: COMMERCIAL

## 2025-01-17 DIAGNOSIS — I50.22 CHRONIC SYSTOLIC (CONGESTIVE) HEART FAILURE: Primary | ICD-10-CM

## 2025-01-17 PROCEDURE — 93798 PHYS/QHP OP CAR RHAB W/ECG: CPT

## 2025-01-17 RX ORDER — LEVOTHYROXINE SODIUM 175 UG/1
175 TABLET ORAL DAILY
Qty: 30 TABLET | Refills: 0 | Status: SHIPPED | OUTPATIENT
Start: 2025-01-17

## 2025-01-20 ENCOUNTER — TREATMENT (OUTPATIENT)
Dept: CARDIAC REHAB | Facility: HOSPITAL | Age: 65
End: 2025-01-20
Payer: COMMERCIAL

## 2025-01-20 DIAGNOSIS — I50.22 CHRONIC SYSTOLIC (CONGESTIVE) HEART FAILURE: Primary | ICD-10-CM

## 2025-01-20 PROCEDURE — 93798 PHYS/QHP OP CAR RHAB W/ECG: CPT

## 2025-01-22 ENCOUNTER — TREATMENT (OUTPATIENT)
Dept: CARDIAC REHAB | Facility: HOSPITAL | Age: 65
End: 2025-01-22
Payer: COMMERCIAL

## 2025-01-22 DIAGNOSIS — I50.22 CHRONIC SYSTOLIC (CONGESTIVE) HEART FAILURE: Primary | ICD-10-CM

## 2025-01-22 PROCEDURE — 93798 PHYS/QHP OP CAR RHAB W/ECG: CPT

## 2025-01-24 ENCOUNTER — TREATMENT (OUTPATIENT)
Dept: CARDIAC REHAB | Facility: HOSPITAL | Age: 65
End: 2025-01-24
Payer: COMMERCIAL

## 2025-01-24 DIAGNOSIS — I50.22 CHRONIC SYSTOLIC (CONGESTIVE) HEART FAILURE: Primary | ICD-10-CM

## 2025-01-24 PROCEDURE — 93798 PHYS/QHP OP CAR RHAB W/ECG: CPT

## 2025-01-27 ENCOUNTER — TREATMENT (OUTPATIENT)
Dept: CARDIAC REHAB | Facility: HOSPITAL | Age: 65
End: 2025-01-27
Payer: COMMERCIAL

## 2025-01-27 DIAGNOSIS — I50.22 CHRONIC SYSTOLIC (CONGESTIVE) HEART FAILURE: Primary | ICD-10-CM

## 2025-01-27 PROCEDURE — 93798 PHYS/QHP OP CAR RHAB W/ECG: CPT

## 2025-01-29 ENCOUNTER — TREATMENT (OUTPATIENT)
Dept: CARDIAC REHAB | Facility: HOSPITAL | Age: 65
End: 2025-01-29
Payer: COMMERCIAL

## 2025-01-29 DIAGNOSIS — I50.22 CHRONIC SYSTOLIC (CONGESTIVE) HEART FAILURE: Primary | ICD-10-CM

## 2025-01-29 PROCEDURE — 93798 PHYS/QHP OP CAR RHAB W/ECG: CPT

## 2025-01-29 NOTE — ASSESSMENT & PLAN NOTE
"Onset 2014.  Rheumatoid factor negative.  CCP negative.  14.3.3 ETA negative.  DAMIÁN negative.  Uric acid elevated at 8.9.  X-rays without erosions.  Diagnosed by rheumatologist in [place].  Initial treatment methotrexate and steroids.  Methotrexate monotherapy ineffective.  Humira added until it became ineffective 2016.  Orencia started 2016.  Currently on Orencia weekly injection and methotrexate weekly injection.    He has no swelling.   He has what he terms \"flares\" with occasional aches and pains and fatigue.  He denies joint swelling with the flares. .  He currently shows no evidence of synovitis and no significant deformities.  Swollen joint count is: 0, Tender joint count is: 0, Physician global is: 0, VAS is: 5, Patient global is: 6.  CDAI is 6 - Low Disease Activity   With Uric acid of 8.9, gout could be a consideration but no history of gout like flares. We discussed dietary considerations with uric acid. .  Continue current medications for now.  I will see him in follow-up in 3 months.  Labs today.  "

## 2025-01-30 ENCOUNTER — OFFICE VISIT (OUTPATIENT)
Age: 65
End: 2025-01-30
Payer: COMMERCIAL

## 2025-01-30 ENCOUNTER — LAB (OUTPATIENT)
Facility: HOSPITAL | Age: 65
End: 2025-01-30
Payer: COMMERCIAL

## 2025-01-30 VITALS
WEIGHT: 211.9 LBS | HEIGHT: 74 IN | SYSTOLIC BLOOD PRESSURE: 120 MMHG | BODY MASS INDEX: 27.2 KG/M2 | TEMPERATURE: 97.4 F | HEART RATE: 83 BPM | DIASTOLIC BLOOD PRESSURE: 72 MMHG

## 2025-01-30 DIAGNOSIS — M06.09 RHEUMATOID ARTHRITIS OF MULTIPLE SITES WITH NEGATIVE RHEUMATOID FACTOR: Primary | ICD-10-CM

## 2025-01-30 DIAGNOSIS — D84.821 IMMUNOSUPPRESSION DUE TO DRUG THERAPY: ICD-10-CM

## 2025-01-30 DIAGNOSIS — Z79.899 IMMUNOSUPPRESSION DUE TO DRUG THERAPY: ICD-10-CM

## 2025-01-30 DIAGNOSIS — M06.09 RHEUMATOID ARTHRITIS OF MULTIPLE SITES WITH NEGATIVE RHEUMATOID FACTOR: ICD-10-CM

## 2025-01-30 DIAGNOSIS — G89.29 CHRONIC MIDLINE LOW BACK PAIN WITHOUT SCIATICA: ICD-10-CM

## 2025-01-30 DIAGNOSIS — Z96.651 STATUS POST RIGHT PARTIAL KNEE REPLACEMENT: ICD-10-CM

## 2025-01-30 DIAGNOSIS — M54.50 CHRONIC MIDLINE LOW BACK PAIN WITHOUT SCIATICA: ICD-10-CM

## 2025-01-30 DIAGNOSIS — Z79.899 HIGH RISK MEDICATION USE: ICD-10-CM

## 2025-01-30 DIAGNOSIS — R53.82 CHRONIC FATIGUE: ICD-10-CM

## 2025-01-30 DIAGNOSIS — M17.12 PRIMARY OSTEOARTHRITIS OF LEFT KNEE: ICD-10-CM

## 2025-01-30 LAB
ALBUMIN SERPL-MCNC: 4.4 G/DL (ref 3.5–5.2)
ALBUMIN/GLOB SERPL: 1.6 G/DL
ALP SERPL-CCNC: 86 U/L (ref 39–117)
ALT SERPL W P-5'-P-CCNC: 26 U/L (ref 1–41)
ANION GAP SERPL CALCULATED.3IONS-SCNC: 10 MMOL/L (ref 5–15)
AST SERPL-CCNC: 19 U/L (ref 1–40)
BASOPHILS # BLD AUTO: 0.08 10*3/MM3 (ref 0–0.2)
BASOPHILS NFR BLD AUTO: 1.5 % (ref 0–1.5)
BILIRUB SERPL-MCNC: 1.3 MG/DL (ref 0–1.2)
BUN SERPL-MCNC: 18 MG/DL (ref 8–23)
BUN/CREAT SERPL: 18.4 (ref 7–25)
CALCIUM SPEC-SCNC: 9.4 MG/DL (ref 8.6–10.5)
CHLORIDE SERPL-SCNC: 99 MMOL/L (ref 98–107)
CO2 SERPL-SCNC: 27 MMOL/L (ref 22–29)
CREAT SERPL-MCNC: 0.98 MG/DL (ref 0.76–1.27)
CRP SERPL-MCNC: <0.3 MG/DL (ref 0–0.5)
DEPRECATED RDW RBC AUTO: 47.4 FL (ref 37–54)
EGFRCR SERPLBLD CKD-EPI 2021: 85 ML/MIN/1.73
EOSINOPHIL # BLD AUTO: 0.24 10*3/MM3 (ref 0–0.4)
EOSINOPHIL NFR BLD AUTO: 4.4 % (ref 0.3–6.2)
ERYTHROCYTE [DISTWIDTH] IN BLOOD BY AUTOMATED COUNT: 14.2 % (ref 12.3–15.4)
ERYTHROCYTE [SEDIMENTATION RATE] IN BLOOD: 7 MM/HR (ref 0–20)
GLOBULIN UR ELPH-MCNC: 2.8 GM/DL
GLUCOSE SERPL-MCNC: 100 MG/DL (ref 65–99)
HCT VFR BLD AUTO: 47.6 % (ref 37.5–51)
HGB BLD-MCNC: 16.5 G/DL (ref 13–17.7)
IMM GRANULOCYTES # BLD AUTO: 0.03 10*3/MM3 (ref 0–0.05)
IMM GRANULOCYTES NFR BLD AUTO: 0.6 % (ref 0–0.5)
LYMPHOCYTES # BLD AUTO: 0.85 10*3/MM3 (ref 0.7–3.1)
LYMPHOCYTES NFR BLD AUTO: 15.6 % (ref 19.6–45.3)
MCH RBC QN AUTO: 32 PG (ref 26.6–33)
MCHC RBC AUTO-ENTMCNC: 34.7 G/DL (ref 31.5–35.7)
MCV RBC AUTO: 92.4 FL (ref 79–97)
MONOCYTES # BLD AUTO: 0.91 10*3/MM3 (ref 0.1–0.9)
MONOCYTES NFR BLD AUTO: 16.7 % (ref 5–12)
NEUTROPHILS NFR BLD AUTO: 3.33 10*3/MM3 (ref 1.7–7)
NEUTROPHILS NFR BLD AUTO: 61.2 % (ref 42.7–76)
NRBC BLD AUTO-RTO: 0 /100 WBC (ref 0–0.2)
PLATELET # BLD AUTO: 320 10*3/MM3 (ref 140–450)
PMV BLD AUTO: 9.3 FL (ref 6–12)
POTASSIUM SERPL-SCNC: 4.5 MMOL/L (ref 3.5–5.2)
PROT SERPL-MCNC: 7.2 G/DL (ref 6–8.5)
RBC # BLD AUTO: 5.15 10*6/MM3 (ref 4.14–5.8)
SODIUM SERPL-SCNC: 136 MMOL/L (ref 136–145)
WBC NRBC COR # BLD AUTO: 5.44 10*3/MM3 (ref 3.4–10.8)

## 2025-01-30 PROCEDURE — 80053 COMPREHEN METABOLIC PANEL: CPT

## 2025-01-30 PROCEDURE — 86140 C-REACTIVE PROTEIN: CPT

## 2025-01-30 PROCEDURE — 85025 COMPLETE CBC W/AUTO DIFF WBC: CPT

## 2025-01-30 PROCEDURE — 85652 RBC SED RATE AUTOMATED: CPT

## 2025-01-30 PROCEDURE — 36415 COLL VENOUS BLD VENIPUNCTURE: CPT

## 2025-01-30 RX ORDER — METHOTREXATE 25 MG/ML
20 INJECTION INTRA-ARTERIAL; INTRAMUSCULAR; INTRATHECAL; INTRAVENOUS WEEKLY
Qty: 3.2 ML | Refills: 2 | Status: SHIPPED | OUTPATIENT
Start: 2025-01-30

## 2025-01-30 RX ORDER — ABATACEPT 125 MG/ML
125 INJECTION, SOLUTION SUBCUTANEOUS WEEKLY
Qty: 12 ML | Refills: 0 | Status: SHIPPED | OUTPATIENT
Start: 2025-01-30

## 2025-01-30 RX ORDER — FOLIC ACID 1 MG/1
2 TABLET ORAL DAILY
Qty: 180 TABLET | Refills: 1 | Status: SHIPPED | OUTPATIENT
Start: 2025-01-30

## 2025-01-31 ENCOUNTER — TREATMENT (OUTPATIENT)
Dept: CARDIAC REHAB | Facility: HOSPITAL | Age: 65
End: 2025-01-31
Payer: COMMERCIAL

## 2025-01-31 ENCOUNTER — TELEPHONE (OUTPATIENT)
Dept: CARDIOLOGY | Facility: CLINIC | Age: 65
End: 2025-01-31
Payer: COMMERCIAL

## 2025-01-31 DIAGNOSIS — I50.22 CHRONIC SYSTOLIC (CONGESTIVE) HEART FAILURE: Primary | ICD-10-CM

## 2025-01-31 PROCEDURE — 93798 PHYS/QHP OP CAR RHAB W/ECG: CPT

## 2025-01-31 NOTE — TELEPHONE ENCOUNTER
Returned call from Carina of [employer] life [employer] who states pt's insurance has stopped covering the life vest, but that [employer] will cover him for another month. His order expires Monday and they need a new order and treatment plan signed by Dr. Franz. She stated okay to wait the weekend for him to sign. She is faxing over necessary information now.

## 2025-02-03 ENCOUNTER — TELEPHONE (OUTPATIENT)
Dept: CARDIOLOGY | Facility: CLINIC | Age: 65
End: 2025-02-03
Payer: COMMERCIAL

## 2025-02-03 ENCOUNTER — TREATMENT (OUTPATIENT)
Dept: CARDIAC REHAB | Facility: HOSPITAL | Age: 65
End: 2025-02-03
Payer: COMMERCIAL

## 2025-02-03 DIAGNOSIS — I50.22 CHRONIC SYSTOLIC (CONGESTIVE) HEART FAILURE: Primary | ICD-10-CM

## 2025-02-03 PROCEDURE — 93798 PHYS/QHP OP CAR RHAB W/ECG: CPT

## 2025-02-03 NOTE — TELEPHONE ENCOUNTER
[facility]. MOON Holloway, signed new order and for treatment plan to continue. Signed documents faxed to [employer] and to be scanned into chart.

## 2025-02-05 ENCOUNTER — HOSPITAL ENCOUNTER (OUTPATIENT)
Dept: CARDIOLOGY | Facility: HOSPITAL | Age: 65
Discharge: HOME | End: 2025-02-05
Admitting: INTERNAL MEDICINE
Payer: COMMERCIAL

## 2025-02-05 ENCOUNTER — TREATMENT (OUTPATIENT)
Dept: CARDIAC REHAB | Facility: HOSPITAL | Age: 65
End: 2025-02-05
Payer: COMMERCIAL

## 2025-02-05 DIAGNOSIS — I50.22 CHRONIC SYSTOLIC (CONGESTIVE) HEART FAILURE: Primary | ICD-10-CM

## 2025-02-05 DIAGNOSIS — I50.20 HFREF (HEART FAILURE WITH REDUCED EJECTION FRACTION): ICD-10-CM

## 2025-02-05 PROCEDURE — 93306 TTE W/DOPPLER COMPLETE: CPT

## 2025-02-05 PROCEDURE — 93798 PHYS/QHP OP CAR RHAB W/ECG: CPT

## 2025-02-06 LAB
ASCENDING AORTA: 4.2 CM
AV MEAN PRESS GRAD SYS DOP V1V2: 1.6 MMHG
AV VMAX SYS DOP: 87.3 CM/SEC
BH CV ECHO MEAS - AO MAX PG: 3 MMHG
BH CV ECHO MEAS - AO ROOT DIAM: 3.4 CM
BH CV ECHO MEAS - AO V2 VTI: 13.7 CM
BH CV ECHO MEAS - IVS/LVPW: 1.18 CM
BH CV ECHO MEAS - IVSD: 1.3 CM
BH CV ECHO MEAS - LA DIMENSION: 3.8 CM
BH CV ECHO MEAS - LAT PEAK E' VEL: 4.4 CM/SEC
BH CV ECHO MEAS - LV MAX PG: 2.6 MMHG
BH CV ECHO MEAS - LV MEAN PG: 1.2 MMHG
BH CV ECHO MEAS - LV V1 MAX: 80.7 CM/SEC
BH CV ECHO MEAS - LV V1 VTI: 14.5 CM
BH CV ECHO MEAS - LVIDD: 3.9 CM
BH CV ECHO MEAS - LVIDS: 1.5 CM
BH CV ECHO MEAS - LVOT DIAM: 2 CM
BH CV ECHO MEAS - LVPWD: 1.1 CM
BH CV ECHO MEAS - MED PEAK E' VEL: 3.9 CM/SEC
BH CV ECHO MEAS - MV A MAX VEL: 75 CM/SEC
BH CV ECHO MEAS - MV DEC SLOPE: 172.7 CM/SEC2
BH CV ECHO MEAS - MV E MAX VEL: 41.5 CM/SEC
BH CV ECHO MEAS - MV E/A: 0.55
BH CV ECHO MEAS - MV MAX PG: 3.7 MMHG
BH CV ECHO MEAS - MV MEAN PG: 1.3 MMHG
BH CV ECHO MEAS - MV V2 VTI: 19.4 CM
BH CV ECHO MEAS - PA ACC TIME: 0.11 SEC
BH CV ECHO MEAS - PI END-D VEL: 94.9 CM/SEC
BH CV ECHO MEAS - TAPSE (>1.6): 2.16 CM
BH CV ECHO MEASUREMENTS AVERAGE E/E' RATIO: 10
BH CV XLRA - RV BASE: 3.4 CM
BH CV XLRA - RV LENGTH: 8 CM
BH CV XLRA - RV MID: 3.1 CM
BH CV XLRA - TDI S': 9.2 CM/SEC
LEFT ATRIUM VOLUME INDEX: 24.1 ML/M2
LV EF 2D ECHO EST: 45 %

## 2025-02-07 ENCOUNTER — TREATMENT (OUTPATIENT)
Dept: CARDIAC REHAB | Facility: HOSPITAL | Age: 65
End: 2025-02-07
Payer: COMMERCIAL

## 2025-02-07 DIAGNOSIS — I50.22 CHRONIC SYSTOLIC (CONGESTIVE) HEART FAILURE: Primary | ICD-10-CM

## 2025-02-07 PROCEDURE — 93798 PHYS/QHP OP CAR RHAB W/ECG: CPT

## 2025-02-10 ENCOUNTER — PATIENT MESSAGE (OUTPATIENT)
Dept: CARDIOLOGY | Facility: CLINIC | Age: 65
End: 2025-02-10
Payer: COMMERCIAL

## 2025-02-10 ENCOUNTER — TREATMENT (OUTPATIENT)
Dept: CARDIAC REHAB | Facility: HOSPITAL | Age: 65
End: 2025-02-10
Payer: COMMERCIAL

## 2025-02-10 DIAGNOSIS — I50.22 CHRONIC SYSTOLIC (CONGESTIVE) HEART FAILURE: Primary | ICD-10-CM

## 2025-02-10 PROCEDURE — 93798 PHYS/QHP OP CAR RHAB W/ECG: CPT

## 2025-02-12 ENCOUNTER — TELEPHONE (OUTPATIENT)
Dept: CARDIOLOGY | Facility: HOSPITAL | Age: 65
End: 2025-02-12
Payer: COMMERCIAL

## 2025-02-12 ENCOUNTER — TELEPHONE (OUTPATIENT)
Dept: CARDIOLOGY | Facility: CLINIC | Age: 65
End: 2025-02-12
Payer: COMMERCIAL

## 2025-02-12 ENCOUNTER — TREATMENT (OUTPATIENT)
Dept: CARDIAC REHAB | Facility: HOSPITAL | Age: 65
End: 2025-02-12
Payer: COMMERCIAL

## 2025-02-12 DIAGNOSIS — I50.22 CHRONIC SYSTOLIC (CONGESTIVE) HEART FAILURE: Primary | ICD-10-CM

## 2025-02-12 PROCEDURE — 93798 PHYS/QHP OP CAR RHAB W/ECG: CPT

## 2025-02-12 NOTE — TELEPHONE ENCOUNTER
Caller: Spenser Pascal    Relationship to patient: Self    Best call back number: [phone]    Chief complaint: THE PT CALLED IN ASKING FOR A FOLLOW UP FROM THE ECHO HE HAD ON 02.05.25. PER HUB WORKFLOW, FURTHER DIRECTION IS NEEDED. PLEASE REACH OUT TO SCHEDULE THE PT.     Type of visit: FOLLOW UP    Requested date: ASAP

## 2025-02-12 NOTE — TELEPHONE ENCOUNTER
Pt called to discuss results from Dr. Franz concerning ECHO. Discussed me drafting a letter so that he can return to work.   Pt would like to get an earlier appt than August-when this appt was made August was the earliest available. Office  made aware.

## 2025-02-14 ENCOUNTER — PATIENT MESSAGE (OUTPATIENT)
Age: 65
End: 2025-02-14
Payer: COMMERCIAL

## 2025-02-14 ENCOUNTER — TELEPHONE (OUTPATIENT)
Dept: CARDIOLOGY | Facility: CLINIC | Age: 65
End: 2025-02-14
Payer: COMMERCIAL

## 2025-02-14 ENCOUNTER — TREATMENT (OUTPATIENT)
Dept: CARDIAC REHAB | Facility: HOSPITAL | Age: 65
End: 2025-02-14
Payer: COMMERCIAL

## 2025-02-14 DIAGNOSIS — I50.22 CHRONIC SYSTOLIC (CONGESTIVE) HEART FAILURE: Primary | ICD-10-CM

## 2025-02-14 PROCEDURE — 93798 PHYS/QHP OP CAR RHAB W/ECG: CPT

## 2025-02-14 NOTE — TELEPHONE ENCOUNTER
Spoke with pt concerning wrapping up short-term disability paperwork today to send to Cristhian Skaggs. Confirmed will also send copy of return to work note to pt home address. Pt working with his manager to determine best schedule for him to return to [facility] as well as attend cardiac rehab. Made pt aware that our letter will state that pt is able to return to work with no restrictions. He verbalized understanding and no further questions.

## 2025-02-17 ENCOUNTER — TREATMENT (OUTPATIENT)
Dept: CARDIAC REHAB | Facility: HOSPITAL | Age: 65
End: 2025-02-17
Payer: COMMERCIAL

## 2025-02-17 ENCOUNTER — OFFICE VISIT (OUTPATIENT)
Dept: INTERNAL MEDICINE | Facility: CLINIC | Age: 65
End: 2025-02-17
Payer: COMMERCIAL

## 2025-02-17 VITALS
SYSTOLIC BLOOD PRESSURE: 122 MMHG | BODY MASS INDEX: 28.53 KG/M2 | OXYGEN SATURATION: 99 % | HEART RATE: 78 BPM | HEIGHT: 72 IN | DIASTOLIC BLOOD PRESSURE: 80 MMHG | WEIGHT: 210.6 LBS | TEMPERATURE: 97 F

## 2025-02-17 DIAGNOSIS — Z00.00 ROUTINE PHYSICAL EXAMINATION: Primary | ICD-10-CM

## 2025-02-17 DIAGNOSIS — I83.92 VARICOSE VEINS OF LEFT LOWER LEG: ICD-10-CM

## 2025-02-17 DIAGNOSIS — I50.22 CHRONIC SYSTOLIC (CONGESTIVE) HEART FAILURE: Primary | ICD-10-CM

## 2025-02-17 PROCEDURE — 93798 PHYS/QHP OP CAR RHAB W/ECG: CPT

## 2025-02-17 PROCEDURE — 99397 PER PM REEVAL EST PAT 65+ YR: CPT | Performed by: NURSE PRACTITIONER

## 2025-02-17 NOTE — PROGRESS NOTES
"Chief Complaint   Patient presents with    Annual Exam       HPI  Spenser Pascal is a 64 y.o. male presents for a routine physical.  States he is starting to feel like himself again.    The following portions of the patient's history were reviewed and updated as appropriate: allergies, current medications, past family history, past medical history, past social history, past surgical history, and problem list.    Subjective  Review of Systems   Constitutional:  Negative for activity change, appetite change and fatigue.   HENT:  Negative for congestion.    Respiratory:  Negative for cough and shortness of breath.    Cardiovascular:  Negative for chest pain and leg swelling.   Gastrointestinal:  Negative for abdominal pain.   Musculoskeletal:  Positive for arthralgias.   Neurological:  Negative for dizziness, weakness and confusion.   Psychiatric/Behavioral:  Negative for behavioral problems and decreased concentration.        Objective  Visit Vitals  /80 (BP Location: Left arm, Patient Position: Sitting)   Pulse 78   Temp 97 °F (36.1 °C)   Ht 182.9 cm (72\")   Wt 95.5 kg (210 lb 9.6 oz)   SpO2 99%   BMI 28.56 kg/m²        Physical Exam  Vitals and nursing note reviewed.   Constitutional:       Appearance: Normal appearance.   HENT:      Head: Normocephalic.      Right Ear: Tympanic membrane, ear canal and external ear normal.      Left Ear: Tympanic membrane, ear canal and external ear normal.      Ears:      Comments: Wears hearing aids     Mouth/Throat:      Mouth: Mucous membranes are moist.      Pharynx: Oropharynx is clear.   Eyes:      Extraocular Movements: Extraocular movements intact.      Conjunctiva/sclera: Conjunctivae normal.      Pupils: Pupils are equal, round, and reactive to light.   Neck:      Thyroid: No thyromegaly.      Vascular: No carotid bruit.   Cardiovascular:      Rate and Rhythm: Normal rate and regular rhythm.      Pulses: Normal pulses.           Carotid pulses are 2+ on the " right side and 2+ on the left side.       Posterior tibial pulses are 2+ on the right side and 2+ on the left side.      Heart sounds: Normal heart sounds. No murmur heard.     Comments: Multiple large varicose veins LLE  Pulmonary:      Effort: Pulmonary effort is normal. No respiratory distress.      Breath sounds: Normal breath sounds.   Abdominal:      General: Bowel sounds are normal.      Palpations: Abdomen is soft.      Hernia: A hernia (mid abdomen) is present.   Musculoskeletal:      Right lower leg: No edema.      Left lower leg: No edema.   Lymphadenopathy:      Cervical: No cervical adenopathy.   Skin:     General: Skin is warm and dry.      Capillary Refill: Capillary refill takes less than 2 seconds.   Neurological:      General: No focal deficit present.      Mental Status: He is alert and oriented to person, place, and time.      GCS: GCS eye subscore is 4. GCS verbal subscore is 5. GCS motor subscore is 6.      Gait: Gait is intact.   Psychiatric:         Attention and Perception: Attention normal.         Mood and Affect: Mood normal.         Behavior: Behavior normal.         Thought Content: Thought content normal.         Cognition and Memory: Cognition and memory normal.          Procedures       BMI is >= 25 and <30. (Overweight) The following options were offered after discussion;: exercise counseling/recommendations and nutrition counseling/recommendations     Assessment and Plan  Diagnoses and all orders for this visit:    1. Routine physical examination (Primary)    2. Varicose veins of left lower leg    Labs UTD  PSA screen due in August  Colonoscopy UTD  Denies pain of left leg    Return in about 1 year (around 2/17/2026) for Annual physical.        MOON Wu

## 2025-02-19 ENCOUNTER — APPOINTMENT (OUTPATIENT)
Dept: CARDIAC REHAB | Facility: HOSPITAL | Age: 65
End: 2025-02-19
Payer: COMMERCIAL

## 2025-02-20 ENCOUNTER — TELEPHONE (OUTPATIENT)
Dept: CARDIOLOGY | Facility: CLINIC | Age: 65
End: 2025-02-20
Payer: COMMERCIAL

## 2025-02-20 NOTE — TELEPHONE ENCOUNTER
At pt request, faxed letter concerning cardiac rehab to Cristhian Parmar Received order from Dr. Franz per secure chat that okay to send letter.

## 2025-02-21 ENCOUNTER — TREATMENT (OUTPATIENT)
Dept: CARDIAC REHAB | Facility: HOSPITAL | Age: 65
End: 2025-02-21
Payer: COMMERCIAL

## 2025-02-21 DIAGNOSIS — E03.9 ACQUIRED HYPOTHYROIDISM: ICD-10-CM

## 2025-02-21 DIAGNOSIS — I50.22 CHRONIC SYSTOLIC (CONGESTIVE) HEART FAILURE: Primary | ICD-10-CM

## 2025-02-21 PROCEDURE — 93798 PHYS/QHP OP CAR RHAB W/ECG: CPT

## 2025-02-21 RX ORDER — LEVOTHYROXINE SODIUM 175 UG/1
175 TABLET ORAL DAILY
Qty: 30 TABLET | Refills: 0 | Status: SHIPPED | OUTPATIENT
Start: 2025-02-21

## 2025-02-24 ENCOUNTER — TREATMENT (OUTPATIENT)
Dept: CARDIAC REHAB | Facility: HOSPITAL | Age: 65
End: 2025-02-24
Payer: COMMERCIAL

## 2025-02-24 DIAGNOSIS — I50.22 CHRONIC SYSTOLIC (CONGESTIVE) HEART FAILURE: Primary | ICD-10-CM

## 2025-02-24 PROCEDURE — 93798 PHYS/QHP OP CAR RHAB W/ECG: CPT

## 2025-02-26 ENCOUNTER — TREATMENT (OUTPATIENT)
Dept: CARDIAC REHAB | Facility: HOSPITAL | Age: 65
End: 2025-02-26
Payer: COMMERCIAL

## 2025-02-26 DIAGNOSIS — I50.22 CHRONIC SYSTOLIC (CONGESTIVE) HEART FAILURE: Primary | ICD-10-CM

## 2025-02-26 PROCEDURE — 93798 PHYS/QHP OP CAR RHAB W/ECG: CPT

## 2025-02-28 ENCOUNTER — TREATMENT (OUTPATIENT)
Dept: CARDIAC REHAB | Facility: HOSPITAL | Age: 65
End: 2025-02-28
Payer: COMMERCIAL

## 2025-02-28 DIAGNOSIS — I50.22 CHRONIC SYSTOLIC (CONGESTIVE) HEART FAILURE: Primary | ICD-10-CM

## 2025-02-28 PROCEDURE — 93798 PHYS/QHP OP CAR RHAB W/ECG: CPT

## 2025-03-03 ENCOUNTER — TREATMENT (OUTPATIENT)
Dept: CARDIAC REHAB | Facility: HOSPITAL | Age: 65
End: 2025-03-03
Payer: COMMERCIAL

## 2025-03-03 DIAGNOSIS — I50.22 CHRONIC SYSTOLIC (CONGESTIVE) HEART FAILURE: Primary | ICD-10-CM

## 2025-03-03 PROCEDURE — 93798 PHYS/QHP OP CAR RHAB W/ECG: CPT

## 2025-03-05 ENCOUNTER — TREATMENT (OUTPATIENT)
Dept: CARDIAC REHAB | Facility: HOSPITAL | Age: 65
End: 2025-03-05
Payer: COMMERCIAL

## 2025-03-05 DIAGNOSIS — I50.22 CHRONIC SYSTOLIC (CONGESTIVE) HEART FAILURE: Primary | ICD-10-CM

## 2025-03-05 PROCEDURE — 93798 PHYS/QHP OP CAR RHAB W/ECG: CPT

## 2025-03-07 ENCOUNTER — TREATMENT (OUTPATIENT)
Dept: CARDIAC REHAB | Facility: HOSPITAL | Age: 65
End: 2025-03-07
Payer: COMMERCIAL

## 2025-03-07 DIAGNOSIS — I50.22 CHRONIC SYSTOLIC (CONGESTIVE) HEART FAILURE: Primary | ICD-10-CM

## 2025-03-07 PROCEDURE — 93798 PHYS/QHP OP CAR RHAB W/ECG: CPT

## 2025-03-10 ENCOUNTER — TREATMENT (OUTPATIENT)
Dept: CARDIAC REHAB | Facility: HOSPITAL | Age: 65
End: 2025-03-10
Payer: COMMERCIAL

## 2025-03-10 DIAGNOSIS — I50.22 CHRONIC SYSTOLIC (CONGESTIVE) HEART FAILURE: Primary | ICD-10-CM

## 2025-03-10 PROCEDURE — 93798 PHYS/QHP OP CAR RHAB W/ECG: CPT

## 2025-03-12 ENCOUNTER — TREATMENT (OUTPATIENT)
Dept: CARDIAC REHAB | Facility: HOSPITAL | Age: 65
End: 2025-03-12
Payer: COMMERCIAL

## 2025-03-12 DIAGNOSIS — I50.22 CHRONIC SYSTOLIC (CONGESTIVE) HEART FAILURE: Primary | ICD-10-CM

## 2025-03-12 PROCEDURE — 93798 PHYS/QHP OP CAR RHAB W/ECG: CPT

## 2025-03-14 ENCOUNTER — TREATMENT (OUTPATIENT)
Dept: CARDIAC REHAB | Facility: HOSPITAL | Age: 65
End: 2025-03-14
Payer: COMMERCIAL

## 2025-03-14 DIAGNOSIS — I50.22 CHRONIC SYSTOLIC (CONGESTIVE) HEART FAILURE: Primary | ICD-10-CM

## 2025-03-14 PROCEDURE — 93798 PHYS/QHP OP CAR RHAB W/ECG: CPT

## 2025-03-17 ENCOUNTER — TREATMENT (OUTPATIENT)
Dept: CARDIAC REHAB | Facility: HOSPITAL | Age: 65
End: 2025-03-17
Payer: COMMERCIAL

## 2025-03-17 DIAGNOSIS — I50.22 CHRONIC SYSTOLIC (CONGESTIVE) HEART FAILURE: Primary | ICD-10-CM

## 2025-03-17 DIAGNOSIS — E03.9 ACQUIRED HYPOTHYROIDISM: ICD-10-CM

## 2025-03-17 PROCEDURE — 93798 PHYS/QHP OP CAR RHAB W/ECG: CPT

## 2025-03-17 RX ORDER — LEVOTHYROXINE SODIUM 175 UG/1
175 TABLET ORAL DAILY
Qty: 30 TABLET | Refills: 0 | Status: SHIPPED | OUTPATIENT
Start: 2025-03-17

## 2025-03-17 NOTE — PROGRESS NOTES
"[facility] Cardiology    Encounter Date: 2025    Patient ID: Spenser Pascal is a 65 y.o. male.  : 1960     PCP: Jaciel Alex APRN       Chief Complaint: Chronic HFrEF (heart failure with reduced ejection fraction)      PROBLEM LIST:  HFrEF  Echo, 10/30/2024: EF 39.4%. RV /LA/RA cavities dilated. Moderate MV regurgitation. RVSP from TR is mildly elevated at 35-45 mmHg.   LHC, 2024: EF 30%. No evidence of hemodynamically significant coronary disease.   PAF  KEE, 2024: EF 26-30%. LA cavity mildly dilated. Trace to mild MR. Trace aortic insufficiency. Trace TR.   Successful Cardioversion, 2024.   Cardiomyopathy  Echo, 2025: EF 45%. Grade I impaired relaxation. Trace MR. Trace TR with normal RVSP. Mild dilation of ascending aorta.  Hypertension     History of Present Illness  Patient presents today for 3 month follow-up with a history of HFrEF, cardiomyopathy, PAF, and cardiac risk factors. Since last visit, patient had echocardiogram that showed improvement in EF and his LifeVest has been removed. He has completed cardiac rehab and he tolerated this well. He is also exercising on his own. Still does have some shortness of breath with climbing stairs.     No Known Allergies      Current Outpatient Medications:     apixaban (ELIQUIS) 5 MG tablet tablet, Take 1 tablet by mouth Every 12 (Twelve) Hours. Indications: Atrial Fibrillation, Atrial Fibrillation, Disp: 180 tablet, Rfl: 3    aspirin 81 MG EC tablet, Take 1 tablet by mouth Daily., Disp: 30 tablet, Rfl: 3    BD SafetyGlide Allergy Syringe 27G X 1/2\" 1 ML misc, , Disp: , Rfl:     Cholecalciferol 10 MCG (400 UNIT) tablet, Take 1 tablet by mouth Daily., Disp: , Rfl:     empagliflozin (JARDIANCE) 10 MG tablet tablet, Take 1 tablet by mouth Daily., Disp: 90 tablet, Rfl: 3    folic acid (FOLVITE) 1 MG tablet, Take 2 tablets by mouth Daily., Disp: 180 tablet, Rfl: 1    furosemide (LASIX) 40 MG " "tablet, Take 1 tablet by mouth Daily., Disp: 90 tablet, Rfl: 3    levothyroxine (SYNTHROID, LEVOTHROID) 175 MCG tablet, Take 1 tablet by mouth once daily, Disp: 30 tablet, Rfl: 0    Methotrexate Sodium (methotrexate PF) 50 MG/2ML vial, Inject 0.8 mL under the skin into the appropriate area as directed 1 (One) Time Per Week., Disp: 3.2 mL, Rfl: 2    metoprolol succinate XL (TOPROL-XL) 50 MG 24 hr tablet, Take 1 tablet by mouth Daily., Disp: 90 tablet, Rfl: 3    Orencia ClickJect 125 MG/ML solution auto-injector, Inject 1 mL under the skin into the appropriate area as directed 1 (One) Time Per Week., Disp: 12 mL, Rfl: 0    sacubitril-valsartan (Entresto) 24-26 MG tablet, Take 1 tablet by mouth 2 (Two) Times a Day., Disp: 180 tablet, Rfl: 3    spironolactone (ALDACTONE) 25 MG tablet, Take 1 tablet by mouth Daily., Disp: 90 tablet, Rfl: 3    Syringe/Needle, Disp, (Syringe Luer Slip) 27G X 1/2\" 1 ML misc, USE WITH METHOTREXATE, Disp: 50 each, Rfl: 1    The following portions of the patient's history were reviewed and updated as appropriate: allergies, current medications, past family history, past medical history, past social history, past surgical history and problem list.    ROS  Review of Systems   14 point ROS negative except for that listed in the HPI.         Objective:     /74 (BP Location: Left arm, Patient Position: Sitting, Cuff Size: Adult)   Pulse 77   Ht 182.9 cm (72\")   Wt 97.7 kg (215 lb 6.4 oz)   SpO2 97%   BMI 29.21 kg/m²      Physical Exam  Constitutional: Patient appears well-developed and well-nourished.   HENT: HEENT exam unremarkable.   Neck: Neck supple. No JVD present.  Cardiovascular: Normal rate, regular rhythm and normal heart sounds. No murmur heard.   2+ symmetric pulses.   Pulmonary/Chest: Breath sounds normal.  Musculoskeletal: Does not exhibit edema.   Neurological: Neurological exam unremarkable.   Vitals reviewed.    Data Review:   Lab Results   Component Value Date    GLUCOSE " 100 (H) 01/30/2025    BUN 18 01/30/2025    CREATININE 0.98 01/30/2025    EGFR 85.0 01/30/2025    BCR 18.4 01/30/2025     01/30/2025    K 4.5 01/30/2025    CO2 27.0 01/30/2025    CALCIUM 9.4 01/30/2025    ALBUMIN 4.4 01/30/2025    AST 19 01/30/2025    ALT 26 01/30/2025     Lab Results   Component Value Date    CHOL 121 11/02/2024    TRIG 64 11/02/2024    HDL 50 11/02/2024    LDL 57 11/02/2024      Lab Results   Component Value Date    WBC 5.44 01/30/2025    RBC 5.15 01/30/2025    HGB 16.5 01/30/2025    HCT 47.6 01/30/2025    MCV 92.4 01/30/2025     01/30/2025     Lab Results   Component Value Date    TSH 1.270 11/01/2024     Lab Results   Component Value Date    HGBA1C 5.50 11/02/2024        Procedures       Advance Care Planning   ACP discussion was held with the patient during this visit. Patient has an advance directive (not in EMR), copy requested.           Assessment and plan:      Diagnosis Plan   1. Chronic HFrEF (heart failure with reduced ejection fraction)  EF improved to 45% and LifeVest removed.  Euvolemic without any CHF symptoms.  Continue current GDMT.    Decrease Lasix to 40 mg every other day.  If he does not have any volume overload or CHF symptoms he can discontinue and use as needed.      2. Cardiomyopathy, unspecified type  As above.      3. PAF (paroxysmal atrial fibrillation)  Maintaining sinus rhythm.  Continue Toprol 50 mg daily for rate control and Eliquis 5 mg twice daily for stroke prophylaxis.      4. Essential hypertension  Well-controlled        Continue current medications.   FU in 6 MO, sooner as needed.  Thank you for allowing us to participate in the care of your patient.       Gaviota Cuba PA-C    Please note that portions of this note may have been completed with a voice recognition program. Efforts were made to edit the dictations, but occasionally words are mistranscribed.

## 2025-03-19 ENCOUNTER — TREATMENT (OUTPATIENT)
Dept: CARDIAC REHAB | Facility: HOSPITAL | Age: 65
End: 2025-03-19
Payer: COMMERCIAL

## 2025-03-19 DIAGNOSIS — I50.22 CHRONIC SYSTOLIC (CONGESTIVE) HEART FAILURE: Primary | ICD-10-CM

## 2025-03-19 PROCEDURE — 93798 PHYS/QHP OP CAR RHAB W/ECG: CPT

## 2025-03-19 NOTE — PROGRESS NOTES
Attended Phase II Cardiac Rehab. No medication or health history changes reported. See [facility] for details.    Patient discharged from Phase II Cardiac Rehab completing 36 sessions.

## 2025-03-21 ENCOUNTER — APPOINTMENT (OUTPATIENT)
Dept: CARDIAC REHAB | Facility: HOSPITAL | Age: 65
End: 2025-03-21
Payer: COMMERCIAL

## 2025-03-21 ENCOUNTER — OFFICE VISIT (OUTPATIENT)
Dept: CARDIOLOGY | Facility: CLINIC | Age: 65
End: 2025-03-21
Payer: COMMERCIAL

## 2025-03-21 VITALS
HEIGHT: 72 IN | HEART RATE: 77 BPM | OXYGEN SATURATION: 97 % | DIASTOLIC BLOOD PRESSURE: 74 MMHG | WEIGHT: 215.4 LBS | BODY MASS INDEX: 29.17 KG/M2 | SYSTOLIC BLOOD PRESSURE: 118 MMHG

## 2025-03-21 DIAGNOSIS — I42.9 CARDIOMYOPATHY, UNSPECIFIED TYPE: ICD-10-CM

## 2025-03-21 DIAGNOSIS — I50.22 CHRONIC HFREF (HEART FAILURE WITH REDUCED EJECTION FRACTION): Primary | ICD-10-CM

## 2025-03-21 DIAGNOSIS — I48.0 PAF (PAROXYSMAL ATRIAL FIBRILLATION): ICD-10-CM

## 2025-03-21 DIAGNOSIS — I10 ESSENTIAL HYPERTENSION: ICD-10-CM

## 2025-03-21 RX ORDER — METOPROLOL SUCCINATE 50 MG/1
50 TABLET, EXTENDED RELEASE ORAL DAILY
Qty: 90 TABLET | Refills: 3 | Status: SHIPPED | OUTPATIENT
Start: 2025-03-21

## 2025-03-21 RX ORDER — SACUBITRIL AND VALSARTAN 24; 26 MG/1; MG/1
1 TABLET, FILM COATED ORAL 2 TIMES DAILY
Qty: 180 TABLET | Refills: 3 | Status: SHIPPED | OUTPATIENT
Start: 2025-03-21

## 2025-03-21 RX ORDER — FUROSEMIDE 40 MG/1
40 TABLET ORAL DAILY
Qty: 90 TABLET | Refills: 3 | Status: SHIPPED | OUTPATIENT
Start: 2025-03-21

## 2025-03-21 RX ORDER — SPIRONOLACTONE 25 MG/1
25 TABLET ORAL DAILY
Qty: 90 TABLET | Refills: 3 | Status: SHIPPED | OUTPATIENT
Start: 2025-03-21

## 2025-03-21 RX ORDER — NEEDLES, SAFETY 22GX1 1/2"
NEEDLE, DISPOSABLE MISCELLANEOUS
COMMUNITY
Start: 2025-02-14

## 2025-03-24 ENCOUNTER — APPOINTMENT (OUTPATIENT)
Dept: CARDIAC REHAB | Facility: HOSPITAL | Age: 65
End: 2025-03-24
Payer: COMMERCIAL

## 2025-03-26 ENCOUNTER — APPOINTMENT (OUTPATIENT)
Dept: CARDIAC REHAB | Facility: HOSPITAL | Age: 65
End: 2025-03-26
Payer: COMMERCIAL

## 2025-04-02 ENCOUNTER — TELEPHONE (OUTPATIENT)
Dept: CARDIOLOGY | Facility: CLINIC | Age: 65
End: 2025-04-02
Payer: COMMERCIAL

## 2025-04-02 NOTE — TELEPHONE ENCOUNTER
"Received fax request and paperwork from [facility] concerning needing a \"restrictions\" form. Left VM ivette Kunz stating that STD forms were faxed in February that included restrictions and stated that pt had No Restrictions and could return to work.   "

## 2025-04-20 DIAGNOSIS — E03.9 ACQUIRED HYPOTHYROIDISM: ICD-10-CM

## 2025-04-21 RX ORDER — LEVOTHYROXINE SODIUM 175 UG/1
175 TABLET ORAL DAILY
Qty: 30 TABLET | Refills: 0 | Status: SHIPPED | OUTPATIENT
Start: 2025-04-21

## 2025-04-21 RX ORDER — ABATACEPT 125 MG/ML
INJECTION, SOLUTION SUBCUTANEOUS
Qty: 12 ML | Refills: 0 | Status: SHIPPED | OUTPATIENT
Start: 2025-04-21

## 2025-04-21 NOTE — TELEPHONE ENCOUNTER
Specialty Pharmacy Patient Management Program  Per Protocol Prescription Order/Refill     Patient currently fills medications at [facility]  and is not enrolled in an Rheumatology Patient Management Program.     Requested Prescriptions     Pending Prescriptions Disp Refills    Orencia ClickJect 125 MG/ML solution auto-injector [Pharmacy Med Name: ORENCIA CLICKJECT 125 MG/ML] 12 mL 0     Sig: INJECT 125 MG (1 ML) UNDER THE SKIN ONCE A WEEK INTO THE APPROPRIATE AREA AS DIRECTED     Prescription orders above were sent to the pharmacy per Collaborative Care Agreement Protocol.

## 2025-05-21 DIAGNOSIS — E03.9 ACQUIRED HYPOTHYROIDISM: ICD-10-CM

## 2025-05-21 RX ORDER — LEVOTHYROXINE SODIUM 175 UG/1
175 TABLET ORAL DAILY
Qty: 30 TABLET | Refills: 0 | Status: SHIPPED | OUTPATIENT
Start: 2025-05-21

## 2025-05-29 NOTE — PROGRESS NOTES
Office Visit       Date: 06/02/2025   Patient Name: Spenser Pascal  MRN: [id number]  YOB: 1960    Referring Physician: PCP    Chief Complaint: Rheumatoid arthritis      History of Present Illness: Spenser Pascal is a 65 y.o. male who is here today in follow-up for rheumatoid arthritis.  He was diagnosed with seronegative rheumatoid arthritis in [place] in 2014.      Hands are stiff. No swelling.   He has done fairly well on Orencia. He tolerates it well. No infections.   No joint swelling.   He tolerates MTX well.    He states he has had no swelling since the onset of his rheumatoid arthritis.  He has morning stiffness lasting about 1 hour.  He improves with activity.  He has no history of night pain.  His activities of daily living remain good.  Pain level is 3/10. EMS is 1 hr.     He has tolerated the medications well.  He has been getting labs every 3 months.    He tolerates methotrexate well.  He takes this by injection.  He tolerates the Orencia well and has not had injection site reactions or infections.    Afib has done well.       Subjective   Review of Systems: Review of Systems   Constitutional:  Negative for chills, fatigue, fever and unexpected weight loss.   HENT:  Negative for dental problem, mouth sores, sinus pressure and swollen glands.    Eyes:  Negative for photophobia, pain and redness.   Respiratory:  Negative for apnea, cough, chest tightness and shortness of breath.    Cardiovascular:  Negative for chest pain and leg swelling.   Gastrointestinal:  Negative for abdominal pain, diarrhea, nausea and GERD.   Genitourinary:  Negative for dysuria and hematuria.   Skin:  Negative for dry skin, rash, skin lesions and bruise.   Neurological:  Negative for dizziness, seizures, syncope, weakness, headache and memory problem.   Hematological:  Negative for adenopathy. Does not bruise/bleed easily.   Psychiatric/Behavioral:  Negative for sleep  disturbance, suicidal ideas, depressed mood and stress. The patient is not nervous/anxious.    All other systems reviewed and are negative.       Past Medical History:   Past Medical History:   Diagnosis Date    Abnormal ECG 11/1/2024    At [facility]    Arrhythmia 11/1/24. At [facility]    Arthritis     Rheumatoid    Atrial fibrillation 11/1/24    At [facility]    Broken bones     thumb and toe    Cancer 2020    Squamous cell carcinoma    CHF (congestive heart failure)     11/4/2024    Colon polyp 1985    No others since    Coronary artery disease 11/1/24    Not sure if this is accurate answer    Diverticulosis 2000?    Heart valve disease 10/29/2024    Reported?? in recent chest xray at [facility]    HL (hearing loss) 1995?    Hypertension ?    During routine physical.    Hypothyroidism 2000?    Otosclerosis     Rheumatoid arthritis     Sleep apnea 2012?    Minor. Do not use CPAP       Past Surgical History:   Past Surgical History:   Procedure Laterality Date    CARDIAC CATHETERIZATION N/A 11/4/2024    Procedure: Left Heart Cath;  Surgeon: Rebeka Franz MD;  Location: [facility] CATH INVASIVE LOCATION;  Service: Cardiology;  Laterality: N/A;    COLONOSCOPY  Oct 2023    FRACTURE SURGERY  2002    JOINT REPLACEMENT  2020    LAMINECTOMY      LIPOMA EXCISION      REPLACEMENT TOTAL KNEE      SALIVARY GLAND SURGERY      SPINE SURGERY  2023    Also in 1980 and 2011       Family History:   Family History   Problem Relation Age of Onset    Breast cancer Mother     Cancer Mother         Breast    Hypothyroidism Father     Parkinsonism Father     Hypertension Father     Thyroid disease Father     Other (gall bladder removal) Father     Arthritis Father         Also paternal grandmother    Diabetes Father     Hearing loss Sister     Hearing loss Brother     Hypertension Brother     Cancer Maternal Grandmother         Breast    Heart disease Maternal Grandmother     Heart attack Maternal Grandmother     Hyperlipidemia Maternal  "Grandmother     Heart disease Maternal Grandfather     Heart attack Maternal Grandfather     Arthritis Paternal Grandmother     Hearing loss Paternal Grandmother     Hypertension Paternal Grandmother     Hyperlipidemia Paternal Grandmother     Cancer Paternal Grandmother         Liver    Cancer Paternal Grandfather         Liver    Hypertension Brother        Social History:   Social History     Socioeconomic History    Marital status:    Tobacco Use    Smoking status: Never     Passive exposure: Past    Smokeless tobacco: Never   Vaping Use    Vaping status: Never Used   Substance and Sexual Activity    Alcohol use: Yes     Alcohol/week: 6.0 standard drinks of alcohol     Types: 3 Glasses of wine, 3 Drinks containing 0.5 oz of alcohol per week    Drug use: No    Sexual activity: Not Currently     Partners: Female       Medications:   Current Outpatient Medications:     apixaban (ELIQUIS) 5 MG tablet tablet, Take 1 tablet by mouth Every 12 (Twelve) Hours. Indications: Atrial Fibrillation, Atrial Fibrillation, Disp: 180 tablet, Rfl: 3    aspirin 81 MG EC tablet, Take 1 tablet by mouth Daily., Disp: 30 tablet, Rfl: 3    BD SafetyGlide Allergy Syringe 27G X 1/2\" 1 ML misc, , Disp: , Rfl:     Cholecalciferol 10 MCG (400 UNIT) tablet, Take 1 tablet by mouth Daily., Disp: , Rfl:     empagliflozin (JARDIANCE) 10 MG tablet tablet, Take 1 tablet by mouth Daily., Disp: 90 tablet, Rfl: 3    folic acid (FOLVITE) 1 MG tablet, Take 2 tablets by mouth Daily., Disp: 180 tablet, Rfl: 1    furosemide (LASIX) 40 MG tablet, Take 1 tablet by mouth Daily., Disp: 90 tablet, Rfl: 3    levothyroxine (SYNTHROID, LEVOTHROID) 175 MCG tablet, Take 1 tablet by mouth once daily, Disp: 30 tablet, Rfl: 0    Methotrexate Sodium (methotrexate PF) 50 MG/2ML vial, Inject 0.8 mL under the skin into the appropriate area as directed 1 (One) Time Per Week., Disp: 9.6 mL, Rfl: 0    metoprolol succinate XL (TOPROL-XL) 50 MG 24 hr tablet, Take 1 tablet " "by mouth Daily., Disp: 90 tablet, Rfl: 3    Orencia ClickJect 125 MG/ML solution auto-injector, Inject 1 mL under the skin into the appropriate area as directed 1 (One) Time Per Week., Disp: 12 mL, Rfl: 0    sacubitril-valsartan (Entresto) 24-26 MG tablet, Take 1 tablet by mouth 2 (Two) Times a Day., Disp: 180 tablet, Rfl: 3    spironolactone (ALDACTONE) 25 MG tablet, Take 1 tablet by mouth Daily., Disp: 90 tablet, Rfl: 3    Syringe/Needle, Disp, (Syringe Luer Slip) 27G X 1/2\" 1 ML misc, USE WITH METHOTREXATE, Disp: 50 each, Rfl: 1    Allergies: No Known Allergies    I have reviewed and updated the patient's chief complaint, history of present illness, review of systems, past medical history, surgical history, family history, social history, medications and allergy list as appropriate.     Objective    Vital Signs:   Vitals:    06/02/25 1617   BP: 116/84   Pulse: 60   Temp: 97.6 °F (36.4 °C)   Weight: 95.3 kg (210 lb)   Height: 190.5 cm (75\")   PainSc: 3    PainLoc: Hand  Comment: arms         Body mass index is 26.25 kg/m².     Physical Exam:  General: The patient is well-developed and well nourished. Cooperative, alert and oriented x3. Affect is normal. Hydration appears normal.   HEENT: Normocephalic and atraumatic. No notable alopecia. Lids and conjunctiva are normal. Pupils are equal and sclera are clear. Oropharynx is clear   NECK: Supple without adenopathy, masses or thyromegaly.   CARDIOVASCULAR: Regular rate and rhythm. No murmurs, rubs or gallops   LUNGS: Effort is normal. Lungs are clear bilaterally.  ABDOMEN: Soft and non-tender without masses or hepatosplenomegaly..   EXTREMITIES: No edema.  No cyanosis or clubbing.  SKIN: Inspection and palpation are normal.  NEUROLOGIC: Gait is normal.   MUSCULOSKELETAL: Complete joint exam was performed. There was full range of motion of the shoulders, elbows, wrists and hands without soft tissue swelling synovitis or deformities except as noted.  There are minor " "degenerative changes in the hands.  There is no active synovitis.  Hips have good flexion and internal and external rotation.  Knees have no palpable effusions.  There is a well-healed scar on the right anterior knee.  There is full extension and flexion.  Left knee has bony prominence and crepitus with motion.  Ankles  have no soft tissue swelling or synovitis.  BACK:  Straight without scoliosis  Joint Exam 06/02/2025     The following joints were examined and normal:   Left Glenohumeral, Right Glenohumeral, Left Elbow, Right Elbow, Left Wrist, Right Wrist, Left MCP 1, Right MCP 1, Left MCP 2, Right MCP 2, Left MCP 3, Right MCP 3, Left MCP 4, Right MCP 4, Left MCP 5, Right MCP 5, Left IP (thumb), Right IP (thumb), Left PIP 2 (finger), Right PIP 2 (finger), Left PIP 3 (finger), Right PIP 3 (finger), Left PIP 4 (finger), Right PIP 4 (finger), Left PIP 5 (finger), Right PIP 5 (finger), Left Knee, Right Knee       Patient Global: 20 / 100  Provider Global: 0 / 100      Assessment / Plan        Assessment & Plan  Rheumatoid arthritis of multiple sites with negative rheumatoid factor  Onset 2014.  Rheumatoid factor negative.  CCP negative.  14.3.3 ETA negative.  DAMIÁN negative.  Uric acid elevated at 8.9.  X-rays without erosions.  Diagnosed by rheumatologist in [place].  Initial treatment methotrexate and steroids.  Methotrexate monotherapy ineffective.  Humira added until it became ineffective 2016.  Orencia started 2016.  Currently on Orencia weekly injection and methotrexate weekly injection.    He generally does well. He has no swelling.   He has what he terms \"flares\" with occasional aches and pains and fatigue.  He denies joint swelling with the flares. .  He currently shows no evidence of synovitis and no significant deformities.  Swollen joint count is: 0, Tender joint count is: 0, Physician global is: 0, VAS is: 3, Patient global is: 2.  CDAI is 2 - Low Disease Activity   With Uric acid of 8.9, gout could be a " consideration but no history of gout like flares. We discussed dietary considerations with uric acid. .  Continue current medications for now.  I will see him in follow-up in 3 months.  Labs today.  High risk medication use  Methotrexate.  I discussed getting every 6 to 8-week labs.  Immunosuppression due to drug therapy  Orencia.  No history of opportunistic infections.  No frequent infections.  No injection site reactions.  Chronic midline low back pain without sciatica  He has had multiple lumbar surgeries.  Currently he is doing well.  Chronic fatigue  Long-term problem  Status post right partial knee replacement  This was done by Dr. Ocampo.  He had good results.  Primary osteoarthritis of left knee  Symptoms are currently fairly low-grade.    Orders Placed This Encounter   Procedures    CBC Auto Differential    Comprehensive Metabolic Panel    C-reactive Protein    Sedimentation Rate       New Medications Ordered This Visit   Medications    Methotrexate Sodium (methotrexate PF) 50 MG/2ML vial     Sig: Inject 0.8 mL under the skin into the appropriate area as directed 1 (One) Time Per Week.     Dispense:  9.6 mL     Refill:  0    Orencia ClickJect 125 MG/ML solution auto-injector     Sig: Inject 1 mL under the skin into the appropriate area as directed 1 (One) Time Per Week.     Dispense:  12 mL     Refill:  0    folic acid (FOLVITE) 1 MG tablet     Sig: Take 2 tablets by mouth Daily.     Dispense:  180 tablet     Refill:  1            Follow Up:   Return in about 3 months (around 9/2/2025).        Chava English MD  [facility]

## 2025-05-29 NOTE — ASSESSMENT & PLAN NOTE
"Onset 2014.  Rheumatoid factor negative.  CCP negative.  14.3.3 ETA negative.  DAMIÁN negative.  Uric acid elevated at 8.9.  X-rays without erosions.  Diagnosed by rheumatologist in [place].  Initial treatment methotrexate and steroids.  Methotrexate monotherapy ineffective.  Humira added until it became ineffective 2016.  Orencia started 2016.  Currently on Orencia weekly injection and methotrexate weekly injection.    He generally does well. He has no swelling.   He has what he terms \"flares\" with occasional aches and pains and fatigue.  He denies joint swelling with the flares. .  He currently shows no evidence of synovitis and no significant deformities.  Swollen joint count is: 0, Tender joint count is: 0, Physician global is: 0, VAS is: 3, Patient global is: 2.  CDAI is 2 - Low Disease Activity   With Uric acid of 8.9, gout could be a consideration but no history of gout like flares. We discussed dietary considerations with uric acid. .  Continue current medications for now.  I will see him in follow-up in 3 months.  Labs today.  "

## 2025-06-02 ENCOUNTER — OFFICE VISIT (OUTPATIENT)
Age: 65
End: 2025-06-02
Payer: COMMERCIAL

## 2025-06-02 VITALS
TEMPERATURE: 97.6 F | DIASTOLIC BLOOD PRESSURE: 84 MMHG | HEART RATE: 60 BPM | SYSTOLIC BLOOD PRESSURE: 116 MMHG | BODY MASS INDEX: 26.11 KG/M2 | HEIGHT: 75 IN | WEIGHT: 210 LBS

## 2025-06-02 DIAGNOSIS — R53.82 CHRONIC FATIGUE: ICD-10-CM

## 2025-06-02 DIAGNOSIS — M54.50 CHRONIC MIDLINE LOW BACK PAIN WITHOUT SCIATICA: ICD-10-CM

## 2025-06-02 DIAGNOSIS — M06.09 RHEUMATOID ARTHRITIS OF MULTIPLE SITES WITH NEGATIVE RHEUMATOID FACTOR: Primary | ICD-10-CM

## 2025-06-02 DIAGNOSIS — G89.29 CHRONIC MIDLINE LOW BACK PAIN WITHOUT SCIATICA: ICD-10-CM

## 2025-06-02 DIAGNOSIS — Z79.899 HIGH RISK MEDICATION USE: ICD-10-CM

## 2025-06-02 DIAGNOSIS — Z96.651 STATUS POST RIGHT PARTIAL KNEE REPLACEMENT: ICD-10-CM

## 2025-06-02 DIAGNOSIS — D84.821 IMMUNOSUPPRESSION DUE TO DRUG THERAPY: ICD-10-CM

## 2025-06-02 DIAGNOSIS — Z79.899 IMMUNOSUPPRESSION DUE TO DRUG THERAPY: ICD-10-CM

## 2025-06-02 DIAGNOSIS — M17.12 PRIMARY OSTEOARTHRITIS OF LEFT KNEE: ICD-10-CM

## 2025-06-02 RX ORDER — METHOTREXATE 25 MG/ML
20 INJECTION INTRA-ARTERIAL; INTRAMUSCULAR; INTRATHECAL; INTRAVENOUS WEEKLY
Qty: 9.6 ML | Refills: 0 | Status: SHIPPED | OUTPATIENT
Start: 2025-06-02

## 2025-06-02 RX ORDER — ABATACEPT 125 MG/ML
125 INJECTION, SOLUTION SUBCUTANEOUS WEEKLY
Qty: 12 ML | Refills: 0 | Status: SHIPPED | OUTPATIENT
Start: 2025-06-02

## 2025-06-02 RX ORDER — FOLIC ACID 1 MG/1
2 TABLET ORAL DAILY
Qty: 180 TABLET | Refills: 1 | Status: SHIPPED | OUTPATIENT
Start: 2025-06-02

## 2025-06-03 LAB
ALBUMIN SERPL-MCNC: 4.6 G/DL (ref 3.9–4.9)
ALP SERPL-CCNC: 83 IU/L (ref 44–121)
ALT SERPL-CCNC: 26 IU/L (ref 0–44)
AST SERPL-CCNC: 23 IU/L (ref 0–40)
BASOPHILS # BLD AUTO: 0.1 X10E3/UL (ref 0–0.2)
BASOPHILS NFR BLD AUTO: 1 %
BILIRUB SERPL-MCNC: 1.3 MG/DL (ref 0–1.2)
BUN SERPL-MCNC: 23 MG/DL (ref 8–27)
BUN/CREAT SERPL: 21 (ref 10–24)
CALCIUM SERPL-MCNC: 9.3 MG/DL (ref 8.6–10.2)
CHLORIDE SERPL-SCNC: 100 MMOL/L (ref 96–106)
CO2 SERPL-SCNC: 23 MMOL/L (ref 20–29)
CREAT SERPL-MCNC: 1.09 MG/DL (ref 0.76–1.27)
CRP SERPL-MCNC: 3 MG/L (ref 0–10)
EGFRCR SERPLBLD CKD-EPI 2021: 74 ML/MIN/1.73
EOSINOPHIL # BLD AUTO: 0.2 X10E3/UL (ref 0–0.4)
EOSINOPHIL NFR BLD AUTO: 3 %
ERYTHROCYTE [DISTWIDTH] IN BLOOD BY AUTOMATED COUNT: 13.8 % (ref 11.6–15.4)
ERYTHROCYTE [SEDIMENTATION RATE] IN BLOOD BY WESTERGREN METHOD: 4 MM/HR (ref 0–30)
GLOBULIN SER CALC-MCNC: 2.3 G/DL (ref 1.5–4.5)
GLUCOSE SERPL-MCNC: 84 MG/DL (ref 70–99)
HCT VFR BLD AUTO: 50.3 % (ref 37.5–51)
HGB BLD-MCNC: 17 G/DL (ref 13–17.7)
IMM GRANULOCYTES # BLD AUTO: 0 X10E3/UL (ref 0–0.1)
IMM GRANULOCYTES NFR BLD AUTO: 1 %
LYMPHOCYTES # BLD AUTO: 1 X10E3/UL (ref 0.7–3.1)
LYMPHOCYTES NFR BLD AUTO: 15 %
MCH RBC QN AUTO: 32.9 PG (ref 26.6–33)
MCHC RBC AUTO-ENTMCNC: 33.8 G/DL (ref 31.5–35.7)
MCV RBC AUTO: 98 FL (ref 79–97)
MONOCYTES # BLD AUTO: 0.7 X10E3/UL (ref 0.1–0.9)
MONOCYTES NFR BLD AUTO: 10 %
NEUTROPHILS # BLD AUTO: 4.6 X10E3/UL (ref 1.4–7)
NEUTROPHILS NFR BLD AUTO: 70 %
PLATELET # BLD AUTO: 284 X10E3/UL (ref 150–450)
POTASSIUM SERPL-SCNC: 4.3 MMOL/L (ref 3.5–5.2)
PROT SERPL-MCNC: 6.9 G/DL (ref 6–8.5)
RBC # BLD AUTO: 5.16 X10E6/UL (ref 4.14–5.8)
SODIUM SERPL-SCNC: 139 MMOL/L (ref 134–144)
WBC # BLD AUTO: 6.5 X10E3/UL (ref 3.4–10.8)

## 2025-06-17 ENCOUNTER — OFFICE VISIT (OUTPATIENT)
Dept: INTERNAL MEDICINE | Age: 65
End: 2025-06-17
Payer: COMMERCIAL

## 2025-06-17 VITALS
DIASTOLIC BLOOD PRESSURE: 84 MMHG | HEIGHT: 75 IN | OXYGEN SATURATION: 99 % | HEART RATE: 66 BPM | TEMPERATURE: 97.3 F | WEIGHT: 212.2 LBS | BODY MASS INDEX: 26.38 KG/M2 | SYSTOLIC BLOOD PRESSURE: 124 MMHG

## 2025-06-17 DIAGNOSIS — J06.9 ACUTE UPPER RESPIRATORY INFECTION: Primary | ICD-10-CM

## 2025-06-17 PROCEDURE — 99213 OFFICE O/P EST LOW 20 MIN: CPT | Performed by: NURSE PRACTITIONER

## 2025-06-17 RX ORDER — DOXYCYCLINE 100 MG/1
100 CAPSULE ORAL 2 TIMES DAILY
Qty: 20 CAPSULE | Refills: 0 | Status: SHIPPED | OUTPATIENT
Start: 2025-06-17 | End: 2025-06-27

## 2025-06-17 RX ORDER — PREDNISONE 10 MG/1
TABLET ORAL
Qty: 21 TABLET | Refills: 0 | Status: SHIPPED | OUTPATIENT
Start: 2025-06-17

## 2025-06-17 NOTE — PROGRESS NOTES
"Chief Complaint   Patient presents with    Cough     Patient states cough for 4 weeks. Patient states deep cough has come back stronger since the last week. Patient states brownish mucus occasionally    Shortness of Breath    Wheezing       HPI  Spenser Pascal is a 65 y.o. male presents with a cough for about 4 weeks.  He states the cough improved and now has returned even worse.  He feels like it is \"deep\".   Gets SOB when walking.  Feels like he is also wheezing.  Coughing up \"brown\" phlegm.   Pt states that he just doesn't feel well.      The following portions of the patient's history were reviewed and updated as appropriate: allergies, current medications, past family history, past medical history, past social history, past surgical history, and problem list.    Subjective  Review of Systems   Constitutional:  Positive for fatigue. Negative for activity change and appetite change.   HENT:  Negative for congestion.    Respiratory:  Positive for cough, shortness of breath and wheezing.    Cardiovascular:  Negative for chest pain and leg swelling.   Gastrointestinal:  Negative for abdominal pain.   Neurological:  Negative for dizziness, weakness and confusion.   Psychiatric/Behavioral:  Negative for behavioral problems and decreased concentration.        Objective  Visit Vitals  /84 (BP Location: Left arm, Patient Position: Sitting, Cuff Size: Large Adult)   Pulse 66   Temp 97.3 °F (36.3 °C) (Temporal)   Ht 190.5 cm (75\")   Wt 96.3 kg (212 lb 3.2 oz)   SpO2 99%   BMI 26.52 kg/m²        Physical Exam  Vitals and nursing note reviewed.   HENT:      Head: Normocephalic.   Eyes:      Pupils: Pupils are equal, round, and reactive to light.   Cardiovascular:      Rate and Rhythm: Normal rate and regular rhythm.      Pulses: Normal pulses.      Heart sounds: Normal heart sounds.   Pulmonary:      Effort: Pulmonary effort is normal. No respiratory distress.      Breath sounds: Normal breath sounds. No wheezing, " rhonchi or rales.   Skin:     General: Skin is warm and dry.      Capillary Refill: Capillary refill takes less than 2 seconds.   Neurological:      General: No focal deficit present.      Mental Status: He is alert and oriented to person, place, and time.      Gait: Gait is intact.   Psychiatric:         Attention and Perception: Attention normal.         Mood and Affect: Mood normal.         Behavior: Behavior normal.          Procedures     Assessment and Plan  Diagnoses and all orders for this visit:    1. Acute upper respiratory infection (Primary)  -     predniSONE (DELTASONE) 10 MG (21) dose pack; Use as directed on package  Dispense: 21 tablet; Refill: 0  -     doxycycline (VIBRAMYCIN) 100 MG capsule; Take 1 capsule by mouth 2 (Two) Times a Day for 10 days.  Dispense: 20 capsule; Refill: 0    Appears bronchial at this time  Start Prednisone  Will start Doxy due to being hospital with resp issues earlier this year  Instructed to call back for CXR if no better in 2-3 days for CXR    Return if symptoms worsen or fail to improve.      Jaciel Alex, MOON

## 2025-06-24 DIAGNOSIS — E03.9 ACQUIRED HYPOTHYROIDISM: ICD-10-CM

## 2025-06-25 RX ORDER — LEVOTHYROXINE SODIUM 175 UG/1
175 TABLET ORAL DAILY
Qty: 30 TABLET | Refills: 0 | Status: SHIPPED | OUTPATIENT
Start: 2025-06-25

## 2025-07-22 DIAGNOSIS — E03.9 ACQUIRED HYPOTHYROIDISM: ICD-10-CM

## 2025-07-22 RX ORDER — LEVOTHYROXINE SODIUM 175 UG/1
175 TABLET ORAL DAILY
Qty: 30 TABLET | Refills: 0 | Status: SHIPPED | OUTPATIENT
Start: 2025-07-22

## 2025-08-15 DIAGNOSIS — E03.9 ACQUIRED HYPOTHYROIDISM: ICD-10-CM

## 2025-08-15 RX ORDER — LEVOTHYROXINE SODIUM 175 UG/1
175 TABLET ORAL DAILY
Qty: 30 TABLET | Refills: 0 | Status: SHIPPED | OUTPATIENT
Start: 2025-08-15

## 2025-08-18 ENCOUNTER — SPECIALTY PHARMACY (OUTPATIENT)
Age: 65
End: 2025-08-18
Payer: COMMERCIAL

## 2025-08-18 ENCOUNTER — TELEPHONE (OUTPATIENT)
Age: 65
End: 2025-08-18
Payer: COMMERCIAL

## (undated) DEVICE — ADULT, W/LG. BACK PAD, RADIOTRANSPARENT ELEMENT AND LEAD WIRE COMPATIBLE W/: Brand: DEFIBRILLATION ELECTRODES

## (undated) DEVICE — PK CATH CARD 10

## (undated) DEVICE — CATH DIAG EXPO M/ PK 5F FL4/FR4 PIG

## (undated) DEVICE — GW INQWIRE FC PTFE STD J/1.5 .035 260

## (undated) DEVICE — INTRO SHEATH PRELUDE IDEAL SPRNG COIL 021 6F 23X80CM

## (undated) DEVICE — TR BAND RADIAL ARTERY COMPRESSION DEVICE: Brand: TR BAND

## (undated) DEVICE — MODEL BT2000 P/N 700287-012KIT CONTENTS: MANIFOLD WITH SALINE AND CONTRAST PORTS, SALINE TUBING WITH SPIKE AND HAND SYRINGE, TRANSDUCER: Brand: BT2000 AUTOMATED MANIFOLD KIT

## (undated) DEVICE — MODEL AT P65, P/N 701554-001KIT CONTENTS: HAND CONTROLLER, 3-WAY HIGH-PRESSURE STOPCOCK WITH ROTATING END AND PREMIUM HIGH-PRESSURE TUBING: Brand: ANGIOTOUCH® KIT

## (undated) DEVICE — CATH DIAG EXPO .045 FL3  5F 100CM